# Patient Record
Sex: FEMALE | Race: WHITE | NOT HISPANIC OR LATINO | Employment: OTHER | URBAN - METROPOLITAN AREA
[De-identification: names, ages, dates, MRNs, and addresses within clinical notes are randomized per-mention and may not be internally consistent; named-entity substitution may affect disease eponyms.]

---

## 2017-02-08 RX ORDER — LISINOPRIL 20 MG/1
TABLET ORAL
Qty: 180 TABLET | Refills: 1 | Status: SHIPPED | OUTPATIENT
Start: 2017-02-08 | End: 2017-07-04 | Stop reason: SDUPTHER

## 2017-03-04 DIAGNOSIS — I10 ESSENTIAL HYPERTENSION: ICD-10-CM

## 2017-03-06 RX ORDER — HYDROCHLOROTHIAZIDE 12.5 MG/1
TABLET ORAL
Qty: 90 TABLET | Refills: 1 | Status: SHIPPED | OUTPATIENT
Start: 2017-03-06 | End: 2017-08-08 | Stop reason: SDUPTHER

## 2017-05-24 ENCOUNTER — OFFICE VISIT (OUTPATIENT)
Dept: INTERNAL MEDICINE | Facility: CLINIC | Age: 77
End: 2017-05-24

## 2017-05-24 VITALS
HEART RATE: 56 BPM | BODY MASS INDEX: 28.7 KG/M2 | HEIGHT: 61 IN | DIASTOLIC BLOOD PRESSURE: 70 MMHG | SYSTOLIC BLOOD PRESSURE: 112 MMHG | WEIGHT: 152 LBS | OXYGEN SATURATION: 97 %

## 2017-05-24 DIAGNOSIS — M79.641 BILATERAL HAND PAIN: Primary | ICD-10-CM

## 2017-05-24 DIAGNOSIS — M81.0 SENILE OSTEOPOROSIS: ICD-10-CM

## 2017-05-24 DIAGNOSIS — D50.0 IRON DEFICIENCY ANEMIA DUE TO CHRONIC BLOOD LOSS: ICD-10-CM

## 2017-05-24 DIAGNOSIS — M79.642 BILATERAL HAND PAIN: Primary | ICD-10-CM

## 2017-05-24 DIAGNOSIS — R05.9 COUGH: ICD-10-CM

## 2017-05-24 LAB
ANION GAP SERPL CALCULATED.3IONS-SCNC: 11 MMOL/L
BASOPHILS # BLD AUTO: 0.07 10*3/MM3 (ref 0–0.2)
BASOPHILS NFR BLD AUTO: 1.2 % (ref 0–2)
BUN BLD-MCNC: 34 MG/DL (ref 6–22)
BUN/CREAT SERPL: 21.4 (ref 7–25)
CALCIUM SPEC-SCNC: 9.2 MG/DL (ref 8.6–10.5)
CHLORIDE SERPL-SCNC: 99 MMOL/L (ref 95–107)
CO2 SERPL-SCNC: 26 MMOL/L (ref 23–32)
CREAT BLD-MCNC: 1.59 MG/DL (ref 0.55–1.02)
DEPRECATED RDW RBC AUTO: 55.7 FL (ref 37–54)
EOSINOPHIL # BLD AUTO: 0.58 10*3/MM3 (ref 0–0.7)
EOSINOPHIL NFR BLD AUTO: 9.6 % (ref 0–5)
ERYTHROCYTE [DISTWIDTH] IN BLOOD BY AUTOMATED COUNT: 17.4 % (ref 11.5–15)
FERRITIN SERPL-MCNC: 35.47 NG/ML (ref 13–150)
GFR SERPL CREATININE-BSD FRML MDRD: 32 ML/MIN/1.73
GLUCOSE BLD-MCNC: 95 MG/DL (ref 70–100)
HCT VFR BLD AUTO: 35.7 % (ref 34.1–44.9)
HGB BLD-MCNC: 11.2 G/DL (ref 11.2–15.7)
IRON SATN MFR SERPL: 19 % (ref 20–50)
IRON SERPL-MCNC: 74 MCG/DL (ref 37–145)
LOPINAVIR ISLT PHENOTYP: 310 MCG/DL
LYMPHOCYTES # BLD AUTO: 0.89 10*3/MM3 (ref 0.8–7)
LYMPHOCYTES NFR BLD AUTO: 14.8 % (ref 10–60)
MCH RBC QN AUTO: 27.8 PG (ref 26–34)
MCHC RBC AUTO-ENTMCNC: 31.4 G/DL (ref 31–37)
MCV RBC AUTO: 88.6 FL (ref 80–100)
MONOCYTES # BLD AUTO: 0.78 10*3/MM3 (ref 0–1)
MONOCYTES NFR BLD AUTO: 13 % (ref 0–13)
NEUTROPHILS # BLD AUTO: 3.7 10*3/MM3 (ref 1–11)
NEUTROPHILS NFR BLD AUTO: 61.4 % (ref 30–85)
PLATELET # BLD AUTO: 238 10*3/MM3 (ref 150–450)
PMV BLD AUTO: 9.3 FL (ref 6–12)
POTASSIUM BLD-SCNC: 4.6 MMOL/L (ref 3.3–5.3)
RBC # BLD AUTO: 4.03 10*6/MM3 (ref 3.93–5.22)
SODIUM BLD-SCNC: 136 MMOL/L (ref 136–145)
TIBC SERPL-MCNC: 384 MCG/DL
WBC NRBC COR # BLD: 6.02 10*3/MM3 (ref 5–10)

## 2017-05-24 PROCEDURE — 85025 COMPLETE CBC W/AUTO DIFF WBC: CPT | Performed by: NURSE PRACTITIONER

## 2017-05-24 PROCEDURE — 80048 BASIC METABOLIC PNL TOTAL CA: CPT | Performed by: NURSE PRACTITIONER

## 2017-05-24 PROCEDURE — 36415 COLL VENOUS BLD VENIPUNCTURE: CPT | Performed by: NURSE PRACTITIONER

## 2017-05-24 PROCEDURE — 99214 OFFICE O/P EST MOD 30 MIN: CPT | Performed by: NURSE PRACTITIONER

## 2017-05-24 RX ORDER — FERROUS SULFATE 325(65) MG
325 TABLET ORAL 2 TIMES DAILY
COMMUNITY

## 2017-05-24 RX ORDER — FAMOTIDINE 20 MG/1
20 TABLET, FILM COATED ORAL DAILY
COMMUNITY

## 2017-05-30 ENCOUNTER — HOSPITAL ENCOUNTER (OUTPATIENT)
Dept: INFUSION THERAPY | Facility: HOSPITAL | Age: 77
Discharge: HOME | End: 2017-05-30

## 2017-05-30 VITALS
TEMPERATURE: 97.4 F | OXYGEN SATURATION: 98 % | SYSTOLIC BLOOD PRESSURE: 150 MMHG | RESPIRATION RATE: 16 BRPM | HEART RATE: 47 BPM | DIASTOLIC BLOOD PRESSURE: 89 MMHG

## 2017-05-30 DIAGNOSIS — M81.0 SENILE OSTEOPOROSIS: ICD-10-CM

## 2017-05-30 PROCEDURE — 25010000002 DENOSUMAB 60 MG/ML SOLUTION

## 2017-05-30 PROCEDURE — 96401 CHEMO ANTI-NEOPL SQ/IM: CPT

## 2017-05-30 RX ADMIN — DENOSUMAB 60 MG: 60 INJECTION SUBCUTANEOUS at 13:50

## 2017-05-31 ENCOUNTER — TELEPHONE (OUTPATIENT)
Dept: INTERNAL MEDICINE | Facility: CLINIC | Age: 77
End: 2017-05-31

## 2017-07-05 RX ORDER — LISINOPRIL 20 MG/1
TABLET ORAL
Qty: 180 TABLET | Refills: 3 | Status: SHIPPED | OUTPATIENT
Start: 2017-07-05 | End: 2018-05-16 | Stop reason: SDUPTHER

## 2017-08-08 DIAGNOSIS — I10 ESSENTIAL HYPERTENSION: ICD-10-CM

## 2017-08-08 RX ORDER — HYDROCHLOROTHIAZIDE 12.5 MG/1
TABLET ORAL
Qty: 90 TABLET | Refills: 1 | Status: SHIPPED | OUTPATIENT
Start: 2017-08-08 | End: 2017-10-06

## 2017-08-15 RX ORDER — SIMVASTATIN 20 MG
TABLET ORAL
Qty: 90 TABLET | Refills: 3 | Status: SHIPPED | OUTPATIENT
Start: 2017-08-15 | End: 2018-04-13 | Stop reason: SDUPTHER

## 2017-09-02 DIAGNOSIS — E03.9 ACQUIRED HYPOTHYROIDISM: ICD-10-CM

## 2017-09-05 RX ORDER — LEVOTHYROXINE SODIUM 112 UG/1
TABLET ORAL
Qty: 90 TABLET | Refills: 1 | Status: SHIPPED | OUTPATIENT
Start: 2017-09-05 | End: 2017-11-27 | Stop reason: DRUGHIGH

## 2017-10-04 ENCOUNTER — CLINICAL SUPPORT (OUTPATIENT)
Dept: INTERNAL MEDICINE | Facility: CLINIC | Age: 77
End: 2017-10-04

## 2017-10-04 DIAGNOSIS — Z23 NEED FOR VACCINATION: Primary | ICD-10-CM

## 2017-10-04 PROCEDURE — G0008 ADMIN INFLUENZA VIRUS VAC: HCPCS

## 2017-11-02 ENCOUNTER — APPOINTMENT (OUTPATIENT)
Dept: WOMENS IMAGING | Facility: HOSPITAL | Age: 77
End: 2017-11-02

## 2017-11-02 PROCEDURE — G0202 SCR MAMMO BI INCL CAD: HCPCS | Performed by: RADIOLOGY

## 2017-11-02 PROCEDURE — 77063 BREAST TOMOSYNTHESIS BI: CPT | Performed by: RADIOLOGY

## 2017-11-02 PROCEDURE — MDREVIEWSP: Performed by: RADIOLOGY

## 2017-11-08 ENCOUNTER — TELEPHONE (OUTPATIENT)
Dept: INTERNAL MEDICINE | Facility: CLINIC | Age: 77
End: 2017-11-08

## 2017-11-08 NOTE — TELEPHONE ENCOUNTER
----- Message from JUNE López sent at 11/8/2017  9:19 AM EST -----  Regarding: FW: Visit Follow-Up Question  Contact: [phone]  Please call patient-we can schedule a lab appt early in the morning that day or another day if that would be best for her, whatever she prefers. Thanks.  ----- Message -----     From: Estrella Rodriguez MA     Sent: 11/8/2017   8:47 AM       To: JUNE López  Subject: FW: Visit Follow-Up Question                     Please see message below thanks      ----- Message -----     From: Sylvia Stover     Sent: 11/7/2017   1:23 PM       To: Teofilo Bourne [facility]  Subject: Visit Follow-Up Question                         I have a routine check-up appointment at 1 PM on 11/27.  Should I schedule a blood draw on the same day?  If this is a fasting test I would like to do it fairly early in  the morning.  Also, I'd like to do a calcium measurement from the same blood sample since I have an appointment for a Prolia injection at 1:30 on 11/30/17 at [facility] [place].      Thank you.

## 2017-11-17 ENCOUNTER — TELEPHONE (OUTPATIENT)
Dept: INTERNAL MEDICINE | Facility: CLINIC | Age: 77
End: 2017-11-17

## 2017-11-21 ENCOUNTER — TELEPHONE (OUTPATIENT)
Dept: INTERNAL MEDICINE | Facility: CLINIC | Age: 77
End: 2017-11-21

## 2017-11-21 ENCOUNTER — LAB (OUTPATIENT)
Dept: INTERNAL MEDICINE | Facility: CLINIC | Age: 77
End: 2017-11-21

## 2017-11-21 DIAGNOSIS — I10 ESSENTIAL HYPERTENSION: ICD-10-CM

## 2017-11-21 DIAGNOSIS — E78.5 HYPERLIPIDEMIA, UNSPECIFIED HYPERLIPIDEMIA TYPE: ICD-10-CM

## 2017-11-21 DIAGNOSIS — E03.9 ACQUIRED HYPOTHYROIDISM: Primary | ICD-10-CM

## 2017-11-21 DIAGNOSIS — E03.9 ACQUIRED HYPOTHYROIDISM: ICD-10-CM

## 2017-11-21 LAB
ALBUMIN SERPL-MCNC: 4.5 G/DL (ref 3.5–5.2)
ALBUMIN/GLOB SERPL: 1.7 G/DL
ALP SERPL-CCNC: 41 U/L (ref 39–117)
ALT SERPL-CCNC: 15 U/L (ref 1–33)
AST SERPL-CCNC: 17 U/L (ref 1–32)
BILIRUB SERPL-MCNC: 0.5 MG/DL (ref 0.1–1.2)
BUN SERPL-MCNC: 28 MG/DL (ref 8–23)
BUN/CREAT SERPL: 21.7 (ref 7–25)
CALCIUM SERPL-MCNC: 10.1 MG/DL (ref 8.6–10.5)
CHLORIDE SERPL-SCNC: 100 MMOL/L (ref 98–107)
CHOLEST SERPL-MCNC: 140 MG/DL (ref 0–200)
CO2 SERPL-SCNC: 25.6 MMOL/L (ref 22–29)
CREAT SERPL-MCNC: 1.29 MG/DL (ref 0.57–1)
GLOBULIN SER CALC-MCNC: 2.7 GM/DL
GLUCOSE SERPL-MCNC: 97 MG/DL (ref 65–99)
HDLC SERPL-MCNC: 43 MG/DL (ref 40–60)
LDLC SERPL CALC-MCNC: 68 MG/DL (ref 0–100)
POTASSIUM SERPL-SCNC: 4.7 MMOL/L (ref 3.5–5.2)
PROT SERPL-MCNC: 7.2 G/DL (ref 6–8.5)
SODIUM SERPL-SCNC: 137 MMOL/L (ref 136–145)
TRIGL SERPL-MCNC: 144 MG/DL (ref 0–150)
TSH SERPL-ACNC: 0.17 MIU/ML (ref 0.27–4.2)
VLDLC SERPL-MCNC: 28.8 MG/DL (ref 5–40)

## 2017-11-21 NOTE — TELEPHONE ENCOUNTER
----- Message from Leti Coleman sent at 11/21/2017  8:03 AM EST -----  Contact: pt  Please have Rhoda put lab orders in for this patient. She is coming in today at 10am. thanks

## 2017-11-22 LAB — T4 FREE SERPL-MCNC: 1.84 NG/DL (ref 0.93–1.7)

## 2017-11-27 ENCOUNTER — OFFICE VISIT (OUTPATIENT)
Dept: INTERNAL MEDICINE | Facility: CLINIC | Age: 77
End: 2017-11-27

## 2017-11-27 VITALS
HEIGHT: 61 IN | WEIGHT: 149 LBS | BODY MASS INDEX: 28.13 KG/M2 | DIASTOLIC BLOOD PRESSURE: 80 MMHG | HEART RATE: 60 BPM | SYSTOLIC BLOOD PRESSURE: 124 MMHG | OXYGEN SATURATION: 99 %

## 2017-11-27 DIAGNOSIS — E03.9 ACQUIRED HYPOTHYROIDISM: ICD-10-CM

## 2017-11-27 DIAGNOSIS — M81.0 SENILE OSTEOPOROSIS: ICD-10-CM

## 2017-11-27 DIAGNOSIS — I10 ESSENTIAL HYPERTENSION: Primary | ICD-10-CM

## 2017-11-27 PROCEDURE — 99214 OFFICE O/P EST MOD 30 MIN: CPT | Performed by: NURSE PRACTITIONER

## 2017-11-27 RX ORDER — LEVOTHYROXINE SODIUM 0.1 MG/1
100 TABLET ORAL DAILY
Qty: 90 TABLET | Refills: 1 | Status: SHIPPED | OUTPATIENT
Start: 2017-11-27 | End: 2018-02-12 | Stop reason: SDUPTHER

## 2017-11-27 RX ORDER — AMLODIPINE BESYLATE 2.5 MG/1
2.5 TABLET ORAL DAILY
Qty: 90 TABLET | Refills: 1 | Status: SHIPPED | OUTPATIENT
Start: 2017-11-27 | End: 2018-02-06 | Stop reason: SDUPTHER

## 2017-11-28 NOTE — PROGRESS NOTES
Subjective   Sylvia Stover is a 77 y.o. female who presents for f/u regarding HTN, hyperlipidemia and hypothryoidism.    HPI Comments: Nephrology discontinued HCTZ on 9/2017 due to elevated Cr (1.7), BP has increased slightly. However, she reports fewer leg cramps at night. Her Cr has improved to 1.29 with recent labs.  Her recent TSH was decreased on Synthroid 112mcg daily.    Hypertension   This is a chronic problem. Episode onset: years ago. The problem has been gradually worsening since onset. The problem is uncontrolled. Pertinent negatives include no anxiety, chest pain, headaches, orthopnea, palpitations, peripheral edema or shortness of breath. There are no associated agents to hypertension. Past treatments include calcium channel blockers and beta blockers. The current treatment provides moderate improvement. There are no compliance problems.  Hypertensive end-organ damage includes kidney disease. Thyroid problem: hypothyroidism.   Hypothyroidism   This is a chronic problem. The current episode started more than 1 year ago. Associated symptoms include congestion, fatigue and myalgias (she has a chronic problem with episodic leg muscle cramps.). Pertinent negatives include no abdominal pain, arthralgias, chest pain, chills, coughing, fever, headaches, joint swelling, nausea, sore throat, vomiting or weakness. Urinary symptoms: nocturia: 2 to 4 times.   Hyperlipidemia   This is a chronic problem. The current episode started more than 1 year ago. The problem is controlled. Recent lipid tests were reviewed and are low. Exacerbating diseases include hypothyroidism. She has no history of diabetes. Associated symptoms include myalgias (she has a chronic problem with episodic leg muscle cramps.). Pertinent negatives include no chest pain or shortness of breath. Current antihyperlipidemic treatment includes statins (She is tolerating Simvastatin). The current treatment provides significant improvement of lipids.  There are no compliance problems (denies myalgias with Simvastatin).         The following portions of the patient's history were reviewed and updated as appropriate: allergies, current medications, past social history and problem list.    Past Medical History:   Diagnosis Date   • Age related osteoporosis    • Anemia    • Chronic gastritis    • Chronic renal insufficiency    • Gastric ulcer    • GERD (gastroesophageal reflux disease)    • Greater trochanteric bursitis    • H/O bone density study 11/2012    Osteopenia   • H/O mammogram 10/28/2015    stable   • Hyperlipidemia    • Hypertension    • Hypothyroidism    • Lumbago    • Osteoarthrosis    • PONV (postoperative nausea and vomiting)          Current Outpatient Prescriptions:   •  aspirin 81 MG tablet, Take 81 mg by mouth daily., Disp: , Rfl:   •  budesonide (PULMICORT) 90 MCG/ACT inhaler, Inhale 1 puff 2 (Two) Times a Day. Rinse and spit after use, Disp: 3 inhaler, Rfl: 1  •  Cholecalciferol (VITAMIN D) 2000 UNITS capsule, Take  by mouth., Disp: , Rfl:   •  denosumab (PROLIA) 60 MG/ML solution syringe, Inject  under the skin. Every 6 months, Disp: , Rfl:   •  diclofenac (VOLTAREN) 1 % gel gel, Apply 4 g topically 4 (Four) Times a Day As Needed (pain)., Disp: 300 g, Rfl: 1  •  famotidine (PEPCID) 20 MG tablet, Take 20 mg by mouth 2 (Two) Times a Day., Disp: , Rfl:   •  ferrous sulfate 325 (65 FE) MG tablet, Take 325 mg by mouth Daily With Breakfast., Disp: , Rfl:   •  lisinopril (PRINIVIL,ZESTRIL) 20 MG tablet, Take 1 tablet by mouth two  times daily, Disp: 180 tablet, Rfl: 3  •  metoprolol tartrate (LOPRESSOR) 50 MG tablet, Take 1 tablet by mouth 2 (Two) Times a Day., Disp: 180 tablet, Rfl: 3  •  Multiple Vitamins-Minerals (MULTIVITAMIN PO), Take  by mouth., Disp: , Rfl:   •  simvastatin (ZOCOR) 20 MG tablet, Take 1 tablet by mouth  every night at bedtime, Disp: 90 tablet, Rfl: 3  •  amLODIPine (NORVASC) 2.5 MG tablet, Take 1 tablet by mouth Daily., Disp: 90  "tablet, Rfl: 1  •  levothyroxine (SYNTHROID, LEVOTHROID) 100 MCG tablet, Take 1 tablet by mouth Daily., Disp: 90 tablet, Rfl: 1    Allergies   Allergen Reactions   • Codeine        Review of Systems   Constitutional: Positive for fatigue. Negative for chills, fever and unexpected weight change.   HENT: Positive for congestion. Negative for ear pain, postnasal drip, sinus pressure, sore throat and trouble swallowing.    Eyes: Negative for visual disturbance.   Respiratory: Negative for cough, chest tightness, shortness of breath and wheezing.    Cardiovascular: Negative for chest pain, palpitations, orthopnea and leg swelling.   Gastrointestinal: Negative for abdominal pain, blood in stool, nausea and vomiting.   Genitourinary: Negative for dysuria, frequency and urgency.   Musculoskeletal: Positive for myalgias (she has a chronic problem with episodic leg muscle cramps.). Negative for arthralgias and joint swelling.   Skin: Negative for color change.   Neurological: Negative for syncope, weakness and headaches.   Hematological: Does not bruise/bleed easily.   Psychiatric/Behavioral: Positive for sleep disturbance. The patient has insomnia.        Objective   Vitals:    11/27/17 1256   BP: 124/80   BP Location: Left arm   Patient Position: Sitting   Cuff Size: Adult   Pulse: 60   SpO2: 99%   Weight: 149 lb (67.6 kg)   Height: 61\" (154.9 cm)     Physical Exam   Constitutional: She is oriented to person, place, and time. She appears well-developed and well-nourished. No distress.   HENT:   Head: Normocephalic and atraumatic.   Right Ear: External ear normal.   Left Ear: External ear normal.   Eyes: Conjunctivae are normal.   Neck: Normal range of motion. Neck supple.   Cardiovascular: Normal rate, regular rhythm, normal heart sounds and intact distal pulses.  Exam reveals no gallop and no friction rub.    No murmur heard.  Pulmonary/Chest: Effort normal and breath sounds normal. No respiratory distress. "   Lymphadenopathy:     She has no cervical adenopathy.   Neurological: She is alert and oriented to person, place, and time.   Skin: Skin is warm and dry. No rash noted. No erythema.   Psychiatric: She has a normal mood and affect. Her behavior is normal.   Nursing note and vitals reviewed.      Assessment/Plan   Sylvia was seen today for hypertension and hypothyroidism.    Diagnoses and all orders for this visit:    Essential hypertension  Comments:  add Amlodipine for better BP control  Orders:  -     amLODIPine (NORVASC) 2.5 MG tablet; Take 1 tablet by mouth Daily.    Acquired hypothyroidism  Comments:  reduce Synthroid from 112mcg to 100mcg and recheck at f/u appt  Orders:  -     levothyroxine (SYNTHROID, LEVOTHROID) 100 MCG tablet; Take 1 tablet by mouth Daily.    Senile osteoporosis  Comments:  last bone density 2015, recheck and consider if Prolia is still needed    Other orders  -     DEXA Bone Density Axial; Future

## 2017-11-29 ENCOUNTER — TELEPHONE (OUTPATIENT)
Dept: INTERNAL MEDICINE | Facility: CLINIC | Age: 77
End: 2017-11-29

## 2017-11-29 DIAGNOSIS — M81.0 OSTEOPOROSIS, SENILE: Primary | ICD-10-CM

## 2017-11-29 NOTE — TELEPHONE ENCOUNTER
----- Message from Davina Banueols MA sent at 11/29/2017 10:26 AM EST -----  ELISEO LEZAMA needs order for Prolia Inj Therapy  Pt is schedule for tomorrow 11/30

## 2017-11-30 ENCOUNTER — HOSPITAL ENCOUNTER (OUTPATIENT)
Dept: INFUSION THERAPY | Facility: HOSPITAL | Age: 77
Discharge: HOME | End: 2017-11-30

## 2017-11-30 VITALS
OXYGEN SATURATION: 99 % | RESPIRATION RATE: 20 BRPM | SYSTOLIC BLOOD PRESSURE: 158 MMHG | HEART RATE: 50 BPM | TEMPERATURE: 95.5 F | DIASTOLIC BLOOD PRESSURE: 91 MMHG

## 2017-11-30 DIAGNOSIS — M81.0 SENILE OSTEOPOROSIS: ICD-10-CM

## 2017-11-30 PROCEDURE — 96372 THER/PROPH/DIAG INJ SC/IM: CPT

## 2017-11-30 PROCEDURE — 25010000002 DENOSUMAB 60 MG/ML SOLUTION

## 2017-11-30 RX ADMIN — DENOSUMAB 60 MG: 60 INJECTION SUBCUTANEOUS at 13:59

## 2017-12-19 DIAGNOSIS — I10 ESSENTIAL HYPERTENSION: ICD-10-CM

## 2017-12-20 RX ORDER — METOPROLOL TARTRATE 50 MG/1
TABLET, FILM COATED ORAL
Qty: 270 TABLET | Refills: 3 | Status: SHIPPED | OUTPATIENT
Start: 2017-12-20 | End: 2018-04-02 | Stop reason: SDUPTHER

## 2018-02-06 DIAGNOSIS — I10 ESSENTIAL HYPERTENSION: ICD-10-CM

## 2018-02-06 RX ORDER — AMLODIPINE BESYLATE 2.5 MG/1
TABLET ORAL
Qty: 90 TABLET | Refills: 1 | Status: SHIPPED | OUTPATIENT
Start: 2018-02-06 | End: 2018-03-15 | Stop reason: SDUPTHER

## 2018-02-12 DIAGNOSIS — E03.9 ACQUIRED HYPOTHYROIDISM: ICD-10-CM

## 2018-02-12 RX ORDER — LEVOTHYROXINE SODIUM 0.1 MG/1
TABLET ORAL
Qty: 90 TABLET | Refills: 3 | Status: SHIPPED | OUTPATIENT
Start: 2018-02-12 | End: 2018-05-29 | Stop reason: SDUPTHER

## 2018-03-15 DIAGNOSIS — I10 ESSENTIAL HYPERTENSION: Primary | ICD-10-CM

## 2018-03-15 DIAGNOSIS — I10 ESSENTIAL HYPERTENSION: ICD-10-CM

## 2018-03-15 RX ORDER — AMLODIPINE BESYLATE 5 MG/1
5 TABLET ORAL DAILY
Qty: 90 TABLET | Refills: 1 | Status: SHIPPED | OUTPATIENT
Start: 2018-03-15 | End: 2018-04-13 | Stop reason: SDUPTHER

## 2018-04-02 DIAGNOSIS — I10 ESSENTIAL HYPERTENSION: ICD-10-CM

## 2018-04-02 RX ORDER — METOPROLOL TARTRATE 50 MG/1
TABLET, FILM COATED ORAL
Qty: 270 TABLET | Refills: 3 | Status: SHIPPED | OUTPATIENT
Start: 2018-04-02 | End: 2018-06-22 | Stop reason: SDUPTHER

## 2018-04-13 DIAGNOSIS — I10 ESSENTIAL HYPERTENSION: ICD-10-CM

## 2018-04-13 RX ORDER — SIMVASTATIN 20 MG
20 TABLET ORAL
Qty: 90 TABLET | Refills: 3 | Status: SHIPPED | OUTPATIENT
Start: 2018-04-13 | End: 2018-07-16 | Stop reason: SDUPTHER

## 2018-04-13 RX ORDER — AMLODIPINE BESYLATE 5 MG/1
5 TABLET ORAL DAILY
Qty: 90 TABLET | Refills: 1 | Status: SHIPPED | OUTPATIENT
Start: 2018-04-13 | End: 2018-04-25 | Stop reason: SDUPTHER

## 2018-04-25 DIAGNOSIS — I10 ESSENTIAL HYPERTENSION: ICD-10-CM

## 2018-04-25 RX ORDER — AMLODIPINE BESYLATE 5 MG/1
5 TABLET ORAL DAILY
Qty: 90 TABLET | Refills: 1 | Status: SHIPPED | OUTPATIENT
Start: 2018-04-25 | End: 2018-05-17 | Stop reason: SDUPTHER

## 2018-05-16 RX ORDER — LISINOPRIL 20 MG/1
20 TABLET ORAL 2 TIMES DAILY
Qty: 180 TABLET | Refills: 3 | Status: SHIPPED | OUTPATIENT
Start: 2018-05-16 | End: 2018-08-06 | Stop reason: SDUPTHER

## 2018-05-17 DIAGNOSIS — I10 ESSENTIAL HYPERTENSION: ICD-10-CM

## 2018-05-17 RX ORDER — AMLODIPINE BESYLATE 5 MG/1
5 TABLET ORAL DAILY
Qty: 90 TABLET | Refills: 1 | Status: SHIPPED | OUTPATIENT
Start: 2018-05-17 | End: 2018-05-30 | Stop reason: SDUPTHER

## 2018-05-29 DIAGNOSIS — E03.9 ACQUIRED HYPOTHYROIDISM: ICD-10-CM

## 2018-05-29 RX ORDER — LEVOTHYROXINE SODIUM 0.1 MG/1
100 TABLET ORAL DAILY
Qty: 90 TABLET | Refills: 3 | Status: SHIPPED | OUTPATIENT
Start: 2018-05-29 | End: 2018-08-28 | Stop reason: SDUPTHER

## 2018-05-30 ENCOUNTER — TELEPHONE (OUTPATIENT)
Dept: INTERNAL MEDICINE | Facility: CLINIC | Age: 78
End: 2018-05-30

## 2018-05-30 ENCOUNTER — CLINICAL SUPPORT (OUTPATIENT)
Dept: INTERNAL MEDICINE | Facility: CLINIC | Age: 78
End: 2018-05-30

## 2018-05-30 ENCOUNTER — OFFICE VISIT (OUTPATIENT)
Dept: INTERNAL MEDICINE | Facility: CLINIC | Age: 78
End: 2018-05-30

## 2018-05-30 VITALS
DIASTOLIC BLOOD PRESSURE: 82 MMHG | WEIGHT: 148 LBS | OXYGEN SATURATION: 98 % | BODY MASS INDEX: 27.94 KG/M2 | HEIGHT: 61 IN | SYSTOLIC BLOOD PRESSURE: 132 MMHG | HEART RATE: 48 BPM

## 2018-05-30 DIAGNOSIS — N18.30 STAGE 3 CHRONIC KIDNEY DISEASE (HCC): ICD-10-CM

## 2018-05-30 DIAGNOSIS — H81.10 BENIGN PAROXYSMAL POSITIONAL VERTIGO, UNSPECIFIED LATERALITY: ICD-10-CM

## 2018-05-30 DIAGNOSIS — M81.0 SENILE OSTEOPOROSIS: ICD-10-CM

## 2018-05-30 DIAGNOSIS — M81.0 OSTEOPOROSIS, SENILE: ICD-10-CM

## 2018-05-30 DIAGNOSIS — I10 ESSENTIAL HYPERTENSION: Primary | ICD-10-CM

## 2018-05-30 LAB
BUN SERPL-MCNC: 25 MG/DL (ref 8–23)
BUN/CREAT SERPL: 16.3 (ref 7–25)
CALCIUM SERPL-MCNC: 10.2 MG/DL (ref 8.6–10.5)
CHLORIDE SERPL-SCNC: 99 MMOL/L (ref 98–107)
CO2 SERPL-SCNC: 24.8 MMOL/L (ref 22–29)
CREAT SERPL-MCNC: 1.53 MG/DL (ref 0.57–1)
GLUCOSE SERPL-MCNC: 92 MG/DL (ref 65–99)
POTASSIUM SERPL-SCNC: 5 MMOL/L (ref 3.5–5.2)
SODIUM SERPL-SCNC: 140 MMOL/L (ref 136–145)

## 2018-05-30 PROCEDURE — 77080 DXA BONE DENSITY AXIAL: CPT | Performed by: NURSE PRACTITIONER

## 2018-05-30 PROCEDURE — 99214 OFFICE O/P EST MOD 30 MIN: CPT | Performed by: NURSE PRACTITIONER

## 2018-05-30 RX ORDER — MECLIZINE HCL 12.5 MG/1
12.5 TABLET ORAL 3 TIMES DAILY PRN
Qty: 30 TABLET | Refills: 0 | Status: SHIPPED | OUTPATIENT
Start: 2018-05-30 | End: 2022-01-24 | Stop reason: SDUPTHER

## 2018-05-30 RX ORDER — AMLODIPINE BESYLATE 2.5 MG/1
2.5 TABLET ORAL DAILY
Qty: 90 TABLET | Refills: 1 | Status: SHIPPED | OUTPATIENT
Start: 2018-05-30 | End: 2018-06-04 | Stop reason: SDUPTHER

## 2018-05-30 NOTE — TELEPHONE ENCOUNTER
----- Message from Jewels Ellis sent at 5/30/2018  2:03 PM EDT -----  Contact: Anastasia with [facility]  Pharmacy is calling to get clarification on or to change    RX:meclizine (ANTIVERT) 12.5 MG tablet     Directions.    Caller#[phone]

## 2018-05-31 NOTE — PROGRESS NOTES
Subjective   Sylvia Stover is a 77 y.o. female who presents for f/u regarding HTN, hypothyroidism and hyperlipidemia.    She has brought home BP readings which are excellent, BP consistently <140/90 on current regimen.   She has seen Dr. Spence (hx stage 3 CKD) with stable labs (Cr 1.46, GFR 40).  Her bone density today shows improving T scores, tolerating Prolia well.      Hypertension   This is a chronic problem. Episode onset: years ago. The problem has been gradually worsening since onset. The problem is uncontrolled. Pertinent negatives include no anxiety, chest pain, headaches, orthopnea, palpitations, peripheral edema or shortness of breath. There are no associated agents to hypertension. Current antihypertension treatment includes calcium channel blockers and beta blockers. The current treatment provides moderate improvement. There are no compliance problems.  Hypertensive end-organ damage includes kidney disease. Thyroid problem: hypothyroidism.   Hypothyroidism   This is a chronic problem. The current episode started more than 1 year ago. Associated symptoms include congestion, fatigue and myalgias (she has a chronic problem with episodic leg muscle cramps.). Pertinent negatives include no abdominal pain, arthralgias, chest pain, chills, coughing, fever, headaches, joint swelling, nausea, sore throat, vomiting or weakness. Urinary symptoms: nocturia: 2 to 4 times.   Hyperlipidemia   This is a chronic problem. The current episode started more than 1 year ago. The problem is controlled. Recent lipid tests were reviewed and are low. Exacerbating diseases include hypothyroidism. She has no history of diabetes. Associated symptoms include myalgias (she has a chronic problem with episodic leg muscle cramps.). Pertinent negatives include no chest pain or shortness of breath. Current antihyperlipidemic treatment includes statins (She is tolerating Simvastatin). The current treatment provides significant  improvement of lipids. There are no compliance problems (denies myalgias with Simvastatin).         The following portions of the patient's history were reviewed and updated as appropriate: allergies, current medications, past social history and problem list.    Past Medical History:   Diagnosis Date   • Age related osteoporosis    • Anemia    • Chronic gastritis    • Chronic renal insufficiency    • Gastric ulcer    • GERD (gastroesophageal reflux disease)    • Greater trochanteric bursitis    • H/O bone density study 11/2012    Osteopenia   • H/O mammogram 10/28/2015    stable   • Hyperlipidemia    • Hypertension    • Hypothyroidism    • Lumbago    • Osteoarthrosis    • PONV (postoperative nausea and vomiting)          Current Outpatient Prescriptions:   •  amLODIPine (NORVASC) 2.5 MG tablet, Take 1 tablet by mouth Daily., Disp: 90 tablet, Rfl: 1  •  aspirin 81 MG tablet, Take 81 mg by mouth daily., Disp: , Rfl:   •  budesonide (PULMICORT) 90 MCG/ACT inhaler, Inhale 1 puff 2 (Two) Times a Day. Rinse and spit after use, Disp: 3 inhaler, Rfl: 1  •  Cholecalciferol (VITAMIN D) 2000 UNITS capsule, Take  by mouth., Disp: , Rfl:   •  denosumab (PROLIA) 60 MG/ML solution syringe, Inject  under the skin. Every 6 months, Disp: , Rfl:   •  diclofenac (VOLTAREN) 1 % gel gel, Apply 4 g topically 4 (Four) Times a Day As Needed (pain)., Disp: 300 g, Rfl: 1  •  famotidine (PEPCID) 20 MG tablet, Take 20 mg by mouth 2 (Two) Times a Day., Disp: , Rfl:   •  ferrous sulfate 325 (65 FE) MG tablet, Take 325 mg by mouth Daily With Breakfast., Disp: , Rfl:   •  levothyroxine (SYNTHROID, LEVOTHROID) 100 MCG tablet, Take 1 tablet by mouth Daily., Disp: 90 tablet, Rfl: 3  •  lisinopril (PRINIVIL,ZESTRIL) 20 MG tablet, Take 1 tablet by mouth 2 (Two) Times a Day., Disp: 180 tablet, Rfl: 3  •  metoprolol tartrate (LOPRESSOR) 50 MG tablet, Take 1.5 tablets by mouth two times daily, Disp: 270 tablet, Rfl: 3  •  Multiple Vitamins-Minerals  "(MULTIVITAMIN PO), Take  by mouth., Disp: , Rfl:   •  simvastatin (ZOCOR) 20 MG tablet, Take 1 tablet by mouth every night at bedtime., Disp: 90 tablet, Rfl: 3  •  meclizine (ANTIVERT) 12.5 MG tablet, Take 1 tablet by mouth 3 (Three) Times a Day As Needed for dizziness. 1-2 tablets every 8 hours as needed, Disp: 30 tablet, Rfl: 0    Allergies   Allergen Reactions   • Codeine        Review of Systems   Constitutional: Positive for fatigue. Negative for chills and fever.   HENT: Positive for congestion. Negative for sore throat.    Respiratory: Negative for cough and shortness of breath.    Cardiovascular: Negative for chest pain, palpitations and orthopnea.   Gastrointestinal: Negative for abdominal pain, nausea and vomiting.   Musculoskeletal: Positive for myalgias (she has a chronic problem with episodic leg muscle cramps.). Negative for arthralgias and joint swelling.   Neurological: Negative for weakness and headaches.       Objective   Vitals:    05/30/18 1335   BP: 132/82   BP Location: Left arm   Patient Position: Sitting   Cuff Size: Adult   Pulse: (!) 48   SpO2: 98%   Weight: 67.1 kg (148 lb)   Height: 154.9 cm (61\")     Physical Exam   Constitutional: She appears well-developed and well-nourished. She is cooperative. She does not have a sickly appearance. She does not appear ill.   HENT:   Head: Normocephalic.   Right Ear: Hearing, tympanic membrane and external ear normal.   Left Ear: Hearing, tympanic membrane and external ear normal.   Nose: Nose normal. No mucosal edema, rhinorrhea, sinus tenderness or nasal deformity. Right sinus exhibits no maxillary sinus tenderness and no frontal sinus tenderness. Left sinus exhibits no maxillary sinus tenderness and no frontal sinus tenderness.   Mouth/Throat: Oropharynx is clear and moist and mucous membranes are normal. Normal dentition.   Eyes: Conjunctivae and lids are normal. Pupils are equal, round, and reactive to light. Right eye exhibits no discharge and " no exudate. Left eye exhibits no discharge and no exudate.   Neck: Trachea normal and normal range of motion. Carotid bruit is not present. No edema present. No thyroid mass and no thyromegaly present.   Cardiovascular: Regular rhythm, normal heart sounds and normal pulses.    No murmur heard.  Pulmonary/Chest: Breath sounds normal. No respiratory distress. She has no decreased breath sounds. She has no wheezes. She has no rhonchi. She has no rales.   Lymphadenopathy:        Head (right side): No submental, no submandibular, no tonsillar, no preauricular, no posterior auricular and no occipital adenopathy present.        Head (left side): No submental, no submandibular, no tonsillar, no preauricular, no posterior auricular and no occipital adenopathy present.   Neurological: She is alert.   Skin: Skin is warm, dry and intact. No cyanosis. Nails show no clubbing.       Assessment/Plan   Sylvia was seen today for hypertension.    Diagnoses and all orders for this visit:    Essential hypertension  Comments:  stable on current meds  Orders:  -     amLODIPine (NORVASC) 2.5 MG tablet; Take 1 tablet by mouth Daily.  -     Basic Metabolic Panel; Future  -     Basic Metabolic Panel    Benign paroxysmal positional vertigo, unspecified laterality  Comments:  intermittent episodes of lightheadedness, takes Meclizine only as needed  Orders:  -     meclizine (ANTIVERT) 12.5 MG tablet; Take 1 tablet by mouth 3 (Three) Times a Day As Needed for dizziness. 1-2 tablets every 8 hours as needed    Osteoporosis, senile  Comments:  bone density today, tolerating Prolia (injection next week)    Stage 3 chronic kidney disease  Comments:  followed by Dr. Spence, recent labs are stable

## 2018-06-04 ENCOUNTER — HOSPITAL ENCOUNTER (OUTPATIENT)
Dept: INFUSION THERAPY | Facility: HOSPITAL | Age: 78
Discharge: HOME | End: 2018-06-04

## 2018-06-04 VITALS
HEART RATE: 48 BPM | SYSTOLIC BLOOD PRESSURE: 160 MMHG | DIASTOLIC BLOOD PRESSURE: 74 MMHG | OXYGEN SATURATION: 97 % | RESPIRATION RATE: 20 BRPM | TEMPERATURE: 95.6 F

## 2018-06-04 DIAGNOSIS — M81.0 SENILE OSTEOPOROSIS: ICD-10-CM

## 2018-06-04 DIAGNOSIS — I10 ESSENTIAL HYPERTENSION: ICD-10-CM

## 2018-06-04 PROCEDURE — 25010000002 DENOSUMAB 60 MG/ML SOLUTION: Performed by: NURSE PRACTITIONER

## 2018-06-04 PROCEDURE — 96372 THER/PROPH/DIAG INJ SC/IM: CPT

## 2018-06-04 RX ORDER — AMLODIPINE BESYLATE 2.5 MG/1
2.5 TABLET ORAL DAILY
Qty: 90 TABLET | Refills: 1 | Status: SHIPPED | OUTPATIENT
Start: 2018-06-04 | End: 2018-08-20 | Stop reason: SDUPTHER

## 2018-06-04 RX ADMIN — DENOSUMAB 60 MG: 60 INJECTION SUBCUTANEOUS at 13:47

## 2018-06-06 ENCOUNTER — TELEPHONE (OUTPATIENT)
Dept: INTERNAL MEDICINE | Facility: CLINIC | Age: 78
End: 2018-06-06

## 2018-06-06 NOTE — TELEPHONE ENCOUNTER
----- Message from Lyric West sent at 6/6/2018 11:01 AM EDT -----  Contact: Pharmacy  [facility] calling to clarify prescription     amLODIPine (NORVASC) 2.5 MG tablet    [facility]:[phone]  REF:[id number]

## 2018-06-12 DIAGNOSIS — M81.0 OSTEOPOROSIS, SENILE: Primary | ICD-10-CM

## 2018-06-20 DIAGNOSIS — M25.511 ACUTE PAIN OF RIGHT SHOULDER: Primary | ICD-10-CM

## 2018-06-22 DIAGNOSIS — I10 ESSENTIAL HYPERTENSION: ICD-10-CM

## 2018-06-22 RX ORDER — METOPROLOL TARTRATE 50 MG/1
TABLET, FILM COATED ORAL
Qty: 270 TABLET | Refills: 3 | Status: SHIPPED | OUTPATIENT
Start: 2018-06-22 | End: 2018-06-27 | Stop reason: SDUPTHER

## 2018-06-27 DIAGNOSIS — I10 ESSENTIAL HYPERTENSION: ICD-10-CM

## 2018-06-27 RX ORDER — METOPROLOL TARTRATE 50 MG/1
TABLET, FILM COATED ORAL
Qty: 270 TABLET | Refills: 3 | Status: SHIPPED | OUTPATIENT
Start: 2018-06-27 | End: 2019-03-30 | Stop reason: SDUPTHER

## 2018-07-16 ENCOUNTER — TELEPHONE (OUTPATIENT)
Dept: INTERNAL MEDICINE | Facility: CLINIC | Age: 78
End: 2018-07-16

## 2018-07-16 RX ORDER — SIMVASTATIN 20 MG
20 TABLET ORAL
Qty: 90 TABLET | Refills: 3 | Status: SHIPPED | OUTPATIENT
Start: 2018-07-16 | End: 2018-10-15 | Stop reason: SDUPTHER

## 2018-07-16 NOTE — TELEPHONE ENCOUNTER
"----- Message from Sylvia Stover sent at 7/15/2018  4:47 PM EDT -----  Regarding: Prescription Question  Contact: [phone]  About two weeks ago, I sent a request by computer to [facility] to refill my Simvastatin prescription.  I've since received two e-mail notices from them saying    \"We contacted your doctor again to approve your prescription for order number [id number], but we have not yet received a response.\"    I assume they sent the refill request to Dr. Cantrell since he's the one who wrote the prescription in April.  Could you help me by either asking him about it or could you just write me a new prescription with [facility].  Thank you.      "

## 2018-08-06 RX ORDER — LISINOPRIL 20 MG/1
20 TABLET ORAL 2 TIMES DAILY
Qty: 180 TABLET | Refills: 3 | Status: SHIPPED | OUTPATIENT
Start: 2018-08-06 | End: 2019-05-28 | Stop reason: SDUPTHER

## 2018-08-20 DIAGNOSIS — I10 ESSENTIAL HYPERTENSION: ICD-10-CM

## 2018-08-20 RX ORDER — AMLODIPINE BESYLATE 2.5 MG/1
2.5 TABLET ORAL DAILY
Qty: 90 TABLET | Refills: 1 | Status: SHIPPED | OUTPATIENT
Start: 2018-08-20 | End: 2018-08-24 | Stop reason: SDUPTHER

## 2018-08-24 DIAGNOSIS — I10 ESSENTIAL HYPERTENSION: ICD-10-CM

## 2018-08-24 RX ORDER — AMLODIPINE BESYLATE 2.5 MG/1
2.5 TABLET ORAL DAILY
Qty: 90 TABLET | Refills: 1 | Status: SHIPPED | OUTPATIENT
Start: 2018-08-24 | End: 2019-05-23 | Stop reason: SDUPTHER

## 2018-08-28 ENCOUNTER — TELEPHONE (OUTPATIENT)
Dept: INTERNAL MEDICINE | Facility: CLINIC | Age: 78
End: 2018-08-28

## 2018-08-28 DIAGNOSIS — E03.9 ACQUIRED HYPOTHYROIDISM: ICD-10-CM

## 2018-08-28 RX ORDER — LEVOTHYROXINE SODIUM 0.1 MG/1
100 TABLET ORAL DAILY
Qty: 90 TABLET | Refills: 3 | Status: SHIPPED | OUTPATIENT
Start: 2018-08-28 | End: 2019-05-28 | Stop reason: SDUPTHER

## 2018-08-28 NOTE — TELEPHONE ENCOUNTER
"----- Message from Sylvia Stover sent at 8/27/2018  9:54 PM EDT -----  Regarding: Prescription Question  Contact: [phone]  Once again, I am asking you to see why Dr. Cantrell has not approved a refill with [facility].   On 8/21 I requested a refill of Levothyroxin 100 mcg.  [facility] sent me an e-mail on 8/23 saying \"we contact ed your doctor again, etc.\"  I have no indication that he has yet approved the refill.  Sorry to ask you to spend your time on this matter but I do not have an e-mail address for him.   And even if I had an address I don't know if he would respond.      Thank you for your help.  Sylvia Stover  "

## 2018-08-31 ENCOUNTER — OFFICE VISIT (OUTPATIENT)
Dept: INTERNAL MEDICINE | Facility: CLINIC | Age: 78
End: 2018-08-31

## 2018-08-31 VITALS
HEART RATE: 46 BPM | OXYGEN SATURATION: 98 % | WEIGHT: 144 LBS | DIASTOLIC BLOOD PRESSURE: 80 MMHG | SYSTOLIC BLOOD PRESSURE: 122 MMHG | TEMPERATURE: 97.9 F | HEIGHT: 61 IN | BODY MASS INDEX: 27.19 KG/M2

## 2018-08-31 DIAGNOSIS — H10.31 ACUTE CONJUNCTIVITIS OF RIGHT EYE, UNSPECIFIED ACUTE CONJUNCTIVITIS TYPE: Primary | ICD-10-CM

## 2018-08-31 PROCEDURE — 99213 OFFICE O/P EST LOW 20 MIN: CPT | Performed by: NURSE PRACTITIONER

## 2018-08-31 RX ORDER — TOBRAMYCIN 3 MG/ML
2 SOLUTION/ DROPS OPHTHALMIC EVERY 8 HOURS SCHEDULED
Qty: 5 ML | Refills: 0 | Status: SHIPPED | OUTPATIENT
Start: 2018-08-31 | End: 2019-06-04

## 2018-09-01 NOTE — PROGRESS NOTES
Subjective   Sylvia Stover is a 78 y.o. female who presents due to right eye irritation    Eye Problem    The right eye is affected. This is a new problem. The current episode started in the past 7 days. The problem occurs daily. The problem has been gradually worsening. There was no injury mechanism. The patient is experiencing no pain. Associated symptoms include an eye discharge and eye redness. Pertinent negatives include no blurred vision, double vision, fever, foreign body sensation, photophobia or recent URI. She has tried nothing for the symptoms.        The following portions of the patient's history were reviewed and updated as appropriate: allergies, current medications, past social history and problem list.    Past Medical History:   Diagnosis Date   • Age related osteoporosis    • Anemia    • Chronic gastritis    • Chronic renal insufficiency    • Gastric ulcer    • GERD (gastroesophageal reflux disease)    • Greater trochanteric bursitis    • H/O bone density study 11/2012    Osteopenia   • H/O mammogram 10/28/2015    stable   • Hyperlipidemia    • Hypertension    • Hypothyroidism    • Lumbago    • Osteoarthrosis    • PONV (postoperative nausea and vomiting)          Current Outpatient Prescriptions:   •  amLODIPine (NORVASC) 2.5 MG tablet, Take 1 tablet by mouth Daily., Disp: 90 tablet, Rfl: 1  •  aspirin 81 MG tablet, Take 81 mg by mouth daily., Disp: , Rfl:   •  budesonide (PULMICORT) 90 MCG/ACT inhaler, Inhale 1 puff 2 (Two) Times a Day. Rinse and spit after use, Disp: 3 inhaler, Rfl: 1  •  Cholecalciferol (VITAMIN D) 2000 UNITS capsule, Take  by mouth., Disp: , Rfl:   •  denosumab (PROLIA) 60 MG/ML solution syringe, Inject  under the skin. Every 6 months, Disp: , Rfl:   •  diclofenac (VOLTAREN) 1 % gel gel, Apply 4 g topically 4 (Four) Times a Day As Needed (pain)., Disp: 300 g, Rfl: 1  •  famotidine (PEPCID) 20 MG tablet, Take 20 mg by mouth 2 (Two) Times a Day., Disp: , Rfl:   •  ferrous sulfate  "325 (65 FE) MG tablet, Take 325 mg by mouth Daily With Breakfast., Disp: , Rfl:   •  levothyroxine (SYNTHROID, LEVOTHROID) 100 MCG tablet, Take 1 tablet by mouth Daily., Disp: 90 tablet, Rfl: 3  •  lisinopril (PRINIVIL,ZESTRIL) 20 MG tablet, Take 1 tablet by mouth 2 (Two) Times a Day., Disp: 180 tablet, Rfl: 3  •  meclizine (ANTIVERT) 12.5 MG tablet, Take 1 tablet by mouth 3 (Three) Times a Day As Needed for dizziness. 1-2 tablets every 8 hours as needed, Disp: 30 tablet, Rfl: 0  •  metoprolol tartrate (LOPRESSOR) 50 MG tablet, Take 1.5 tablets by mouth two times daily, Disp: 270 tablet, Rfl: 3  •  Multiple Vitamins-Minerals (MULTIVITAMIN PO), Take  by mouth., Disp: , Rfl:   •  simvastatin (ZOCOR) 20 MG tablet, Take 1 tablet by mouth every night at bedtime., Disp: 90 tablet, Rfl: 3  •  tobramycin 0.3 % solution ophthalmic solution, Administer 2 drops to the right eye Every 8 (Eight) Hours., Disp: 5 mL, Rfl: 0    Allergies   Allergen Reactions   • Codeine        Review of Systems   Constitutional: Negative for chills, fatigue, fever and unexpected weight change.   HENT: Negative for congestion, ear pain, postnasal drip, sinus pressure, sore throat and trouble swallowing.    Eyes: Positive for discharge and redness. Negative for blurred vision, double vision, photophobia, pain and visual disturbance.   Respiratory: Negative for cough, chest tightness and wheezing.    Cardiovascular: Negative for chest pain, palpitations and leg swelling.   Gastrointestinal: Negative for abdominal pain.   Genitourinary: Negative for dysuria.   Musculoskeletal: Negative for arthralgias and joint swelling.   Skin: Negative for color change.   Neurological: Negative for headaches.       Objective   Vitals:    08/31/18 1456   BP: 122/80   BP Location: Left arm   Patient Position: Sitting   Cuff Size: Adult   Pulse: (!) 46   Temp: 97.9 °F (36.6 °C)   TempSrc: Oral   SpO2: 98%   Weight: 65.3 kg (144 lb)   Height: 154.9 cm (61\")     Physical " Exam   Constitutional: She appears well-developed and well-nourished. She is cooperative. She does not have a sickly appearance. She does not appear ill.   HENT:   Head: Normocephalic.   Right Ear: Hearing, tympanic membrane and external ear normal.   Left Ear: Hearing, tympanic membrane and external ear normal.   Nose: Nose normal. No mucosal edema, rhinorrhea, sinus tenderness or nasal deformity. Right sinus exhibits no maxillary sinus tenderness and no frontal sinus tenderness. Left sinus exhibits no maxillary sinus tenderness and no frontal sinus tenderness.   Mouth/Throat: Oropharynx is clear and moist and mucous membranes are normal. Normal dentition.   Eyes: Pupils are equal, round, and reactive to light. EOM and lids are normal. Right eye exhibits discharge. Right eye exhibits no exudate. Left eye exhibits no discharge and no exudate. Right conjunctiva is injected.   Neck: Trachea normal. Carotid bruit is not present. No edema present. No thyroid mass present.   Cardiovascular: Regular rhythm, normal heart sounds and normal pulses.    No murmur heard.  Pulmonary/Chest: Breath sounds normal. No respiratory distress. She has no decreased breath sounds. She has no wheezes. She has no rhonchi. She has no rales.   Lymphadenopathy:        Head (right side): No submental, no submandibular, no tonsillar, no preauricular, no posterior auricular and no occipital adenopathy present.        Head (left side): No submental, no submandibular, no tonsillar, no preauricular, no posterior auricular and no occipital adenopathy present.   Neurological: She is alert.   Skin: Skin is warm, dry and intact. No cyanosis. Nails show no clubbing.       Assessment/Plan   Sylvia was seen today for eye problem.    Diagnoses and all orders for this visit:    Acute conjunctivitis of right eye, unspecified acute conjunctivitis type  -     tobramycin 0.3 % solution ophthalmic solution; Administer 2 drops to the right eye Every 8 (Eight)  Hours.    Apply warm compresses and begin eye drops, f/u with opth if sx persist/worsen (discussed).

## 2018-09-17 DIAGNOSIS — L20.9 ATOPIC DERMATITIS, UNSPECIFIED TYPE: Primary | ICD-10-CM

## 2018-10-03 ENCOUNTER — TELEPHONE (OUTPATIENT)
Dept: INTERNAL MEDICINE | Facility: CLINIC | Age: 78
End: 2018-10-03

## 2018-10-03 DIAGNOSIS — L20.9 ATOPIC DERMATITIS, UNSPECIFIED TYPE: ICD-10-CM

## 2018-10-03 NOTE — TELEPHONE ENCOUNTER
Called pt to inform her that i'm working on her PA for Crisaborole 2 % , will call pt back to let her know if it's approved, if not jamie will try to E RX alternative for it to try.

## 2018-10-15 DIAGNOSIS — E78.5 HYPERLIPIDEMIA, UNSPECIFIED HYPERLIPIDEMIA TYPE: Primary | ICD-10-CM

## 2018-10-15 RX ORDER — SIMVASTATIN 20 MG
20 TABLET ORAL
Qty: 90 TABLET | Refills: 3 | Status: SHIPPED | OUTPATIENT
Start: 2018-10-15 | End: 2019-09-13 | Stop reason: SDUPTHER

## 2018-11-05 ENCOUNTER — APPOINTMENT (OUTPATIENT)
Dept: WOMENS IMAGING | Facility: HOSPITAL | Age: 78
End: 2018-11-05

## 2018-11-05 PROCEDURE — 77063 BREAST TOMOSYNTHESIS BI: CPT | Performed by: RADIOLOGY

## 2018-11-05 PROCEDURE — MDREVIEWSP: Performed by: RADIOLOGY

## 2018-11-05 PROCEDURE — 77067 SCR MAMMO BI INCL CAD: CPT | Performed by: RADIOLOGY

## 2018-11-11 ENCOUNTER — TELEPHONE (OUTPATIENT)
Dept: INTERNAL MEDICINE | Facility: CLINIC | Age: 78
End: 2018-11-11

## 2018-11-11 NOTE — TELEPHONE ENCOUNTER
Her dentist, Dr. Taveras, called requested stopping Prolia therapy due to need for dental extraction. Reviewed recent bone density which showed improvement in T scores, will hold Prolia injections for now pending surgery. Will re-evaluate restarting at later date.

## 2018-11-19 DIAGNOSIS — I10 ESSENTIAL HYPERTENSION: ICD-10-CM

## 2018-11-20 RX ORDER — AMLODIPINE BESYLATE 2.5 MG/1
2.5 TABLET ORAL DAILY
Qty: 90 TABLET | Refills: 1 | Status: SHIPPED | OUTPATIENT
Start: 2018-11-20 | End: 2018-11-30 | Stop reason: SDUPTHER

## 2018-11-30 ENCOUNTER — OFFICE VISIT (OUTPATIENT)
Dept: INTERNAL MEDICINE | Facility: CLINIC | Age: 78
End: 2018-11-30

## 2018-11-30 VITALS
HEART RATE: 49 BPM | DIASTOLIC BLOOD PRESSURE: 70 MMHG | WEIGHT: 146 LBS | HEIGHT: 61 IN | SYSTOLIC BLOOD PRESSURE: 118 MMHG | OXYGEN SATURATION: 99 % | BODY MASS INDEX: 27.56 KG/M2

## 2018-11-30 DIAGNOSIS — E78.5 HYPERLIPIDEMIA, UNSPECIFIED HYPERLIPIDEMIA TYPE: ICD-10-CM

## 2018-11-30 DIAGNOSIS — M81.0 SENILE OSTEOPOROSIS: ICD-10-CM

## 2018-11-30 DIAGNOSIS — N18.30 STAGE 3 CHRONIC KIDNEY DISEASE (HCC): ICD-10-CM

## 2018-11-30 DIAGNOSIS — I10 ESSENTIAL HYPERTENSION: Primary | ICD-10-CM

## 2018-11-30 DIAGNOSIS — L84 CORN OF FOOT: ICD-10-CM

## 2018-11-30 DIAGNOSIS — E89.0 POSTOPERATIVE HYPOTHYROIDISM: ICD-10-CM

## 2018-11-30 LAB
ALBUMIN SERPL-MCNC: 4.5 G/DL (ref 3.5–5.2)
ALBUMIN/GLOB SERPL: 1.5 G/DL
ALP SERPL-CCNC: 42 U/L (ref 39–117)
ALT SERPL W P-5'-P-CCNC: 17 U/L (ref 1–33)
ANION GAP SERPL CALCULATED.3IONS-SCNC: 11.7 MMOL/L
AST SERPL-CCNC: 18 U/L (ref 1–32)
BASOPHILS # BLD AUTO: 0.07 10*3/MM3 (ref 0–0.2)
BASOPHILS NFR BLD AUTO: 1.1 % (ref 0–2)
BILIRUB SERPL-MCNC: 0.5 MG/DL (ref 0.1–1.2)
BUN BLD-MCNC: 26 MG/DL (ref 8–23)
BUN/CREAT SERPL: 19.1 (ref 7–25)
CALCIUM SPEC-SCNC: 9.8 MG/DL (ref 8.6–10.5)
CHLORIDE SERPL-SCNC: 99 MMOL/L (ref 98–107)
CHOLEST SERPL-MCNC: 135 MG/DL (ref 0–200)
CO2 SERPL-SCNC: 24.3 MMOL/L (ref 22–29)
CREAT BLD-MCNC: 1.36 MG/DL (ref 0.57–1)
DEPRECATED RDW RBC AUTO: 44.9 FL (ref 37–54)
EOSINOPHIL # BLD AUTO: 0.33 10*3/MM3 (ref 0–0.7)
EOSINOPHIL NFR BLD AUTO: 5.1 % (ref 0–5)
ERYTHROCYTE [DISTWIDTH] IN BLOOD BY AUTOMATED COUNT: 13.4 % (ref 11.5–15)
GFR SERPL CREATININE-BSD FRML MDRD: 38 ML/MIN/1.73
GLOBULIN UR ELPH-MCNC: 3 GM/DL
GLUCOSE BLD-MCNC: 90 MG/DL (ref 65–99)
HCT VFR BLD AUTO: 38.5 % (ref 34.1–44.9)
HDLC SERPL-MCNC: 47 MG/DL (ref 40–60)
HGB BLD-MCNC: 12.8 G/DL (ref 11.2–15.7)
LDLC SERPL CALC-MCNC: 63 MG/DL (ref 0–100)
LDLC/HDLC SERPL: 1.34 {RATIO}
LYMPHOCYTES # BLD AUTO: 0.81 10*3/MM3 (ref 0.8–7)
LYMPHOCYTES NFR BLD AUTO: 12.6 % (ref 10–60)
MCH RBC QN AUTO: 31.2 PG (ref 26–34)
MCHC RBC AUTO-ENTMCNC: 33.2 G/DL (ref 31–37)
MCV RBC AUTO: 93.9 FL (ref 80–100)
MONOCYTES # BLD AUTO: 0.65 10*3/MM3 (ref 0–1)
MONOCYTES NFR BLD AUTO: 10.1 % (ref 0–13)
NEUTROPHILS # BLD AUTO: 4.56 10*3/MM3 (ref 1–11)
NEUTROPHILS NFR BLD AUTO: 71.1 % (ref 30–85)
PLATELET # BLD AUTO: 219 10*3/MM3 (ref 150–450)
PMV BLD AUTO: 9.4 FL (ref 6–12)
POTASSIUM BLD-SCNC: 4.2 MMOL/L (ref 3.5–5.2)
PROT SERPL-MCNC: 7.5 G/DL (ref 6–8.5)
RBC # BLD AUTO: 4.1 10*6/MM3 (ref 3.93–5.22)
SODIUM BLD-SCNC: 135 MMOL/L (ref 136–145)
TRIGL SERPL-MCNC: 125 MG/DL (ref 0–150)
TSH SERPL DL<=0.05 MIU/L-ACNC: 0.31 MIU/ML (ref 0.27–4.2)
VLDLC SERPL-MCNC: 25 MG/DL (ref 5–40)
WBC NRBC COR # BLD: 6.42 10*3/MM3 (ref 5–10)

## 2018-11-30 PROCEDURE — G0439 PPPS, SUBSEQ VISIT: HCPCS | Performed by: NURSE PRACTITIONER

## 2018-11-30 PROCEDURE — 36415 COLL VENOUS BLD VENIPUNCTURE: CPT | Performed by: NURSE PRACTITIONER

## 2018-11-30 PROCEDURE — 99213 OFFICE O/P EST LOW 20 MIN: CPT | Performed by: NURSE PRACTITIONER

## 2018-11-30 PROCEDURE — 80061 LIPID PANEL: CPT | Performed by: NURSE PRACTITIONER

## 2018-11-30 PROCEDURE — 84443 ASSAY THYROID STIM HORMONE: CPT | Performed by: NURSE PRACTITIONER

## 2018-11-30 PROCEDURE — 80053 COMPREHEN METABOLIC PANEL: CPT | Performed by: NURSE PRACTITIONER

## 2018-11-30 PROCEDURE — 85025 COMPLETE CBC W/AUTO DIFF WBC: CPT | Performed by: NURSE PRACTITIONER

## 2018-12-02 NOTE — PROGRESS NOTES
Subjective   Sylvia Stover is a 78 y.o. female who presents for f/u regarding HTN, GERD and hypothyroidism.    Her recent bone density showed improvement in bone density, scores more consistent with osteopenia. Her endodontist, Dr. Taveras, is evaluating her for possible dental surgery. We have agreed to hold injections for now if surgery is needed.      Hypertension   This is a chronic problem. Episode onset: years ago. The problem has been gradually worsening since onset. The problem is uncontrolled. Associated symptoms include neck pain. Pertinent negatives include no anxiety, chest pain, headaches, orthopnea, palpitations, peripheral edema or shortness of breath. There are no associated agents to hypertension. Current antihypertension treatment includes calcium channel blockers and beta blockers. The current treatment provides moderate improvement. There are no compliance problems.  Hypertensive end-organ damage includes kidney disease. Thyroid problem: hypothyroidism.   Hypothyroidism   This is a chronic problem. The current episode started more than 1 year ago. Associated symptoms include congestion, fatigue, myalgias (she has a chronic problem with episodic leg muscle cramps.) and neck pain. Pertinent negatives include no abdominal pain, arthralgias, chest pain, chills, coughing, fever, headaches, joint swelling, nausea, sore throat, vomiting or weakness. Urinary symptoms: nocturia: 2 to 4 times.        The following portions of the patient's history were reviewed and updated as appropriate: allergies, current medications, past social history and problem list.    Past Medical History:   Diagnosis Date   • Age related osteoporosis    • Anemia    • Chronic gastritis    • Chronic renal insufficiency    • Gastric ulcer    • GERD (gastroesophageal reflux disease)    • Greater trochanteric bursitis    • H/O bone density study 11/2012    Osteopenia   • H/O mammogram 10/28/2015    stable   • Hyperlipidemia    •  Hypertension    • Hypothyroidism    • Lumbago    • Osteoarthrosis    • PONV (postoperative nausea and vomiting)          Current Outpatient Medications:   •  amLODIPine (NORVASC) 2.5 MG tablet, Take 1 tablet by mouth Daily., Disp: 90 tablet, Rfl: 1  •  aspirin 81 MG tablet, Take 81 mg by mouth daily., Disp: , Rfl:   •  budesonide (PULMICORT) 90 MCG/ACT inhaler, Inhale 1 puff 2 (Two) Times a Day. Rinse and spit after use, Disp: 3 inhaler, Rfl: 1  •  Cholecalciferol (VITAMIN D) 2000 UNITS capsule, Take  by mouth., Disp: , Rfl:   •  Crisaborole 2 % ointment, Apply 1 Units topically 2 (Two) Times a Day., Disp: 60 g, Rfl: 1  •  denosumab (PROLIA) 60 MG/ML solution syringe, Inject  under the skin. Every 6 months, Disp: , Rfl:   •  diclofenac (VOLTAREN) 1 % gel gel, Apply 4 g topically 4 (Four) Times a Day As Needed (pain)., Disp: 300 g, Rfl: 1  •  famotidine (PEPCID) 20 MG tablet, Take 20 mg by mouth 2 (Two) Times a Day., Disp: , Rfl:   •  ferrous sulfate 325 (65 FE) MG tablet, Take 325 mg by mouth Daily With Breakfast., Disp: , Rfl:   •  levothyroxine (SYNTHROID, LEVOTHROID) 100 MCG tablet, Take 1 tablet by mouth Daily., Disp: 90 tablet, Rfl: 3  •  lisinopril (PRINIVIL,ZESTRIL) 20 MG tablet, Take 1 tablet by mouth 2 (Two) Times a Day., Disp: 180 tablet, Rfl: 3  •  meclizine (ANTIVERT) 12.5 MG tablet, Take 1 tablet by mouth 3 (Three) Times a Day As Needed for dizziness. 1-2 tablets every 8 hours as needed, Disp: 30 tablet, Rfl: 0  •  metoprolol tartrate (LOPRESSOR) 50 MG tablet, Take 1.5 tablets by mouth two times daily, Disp: 270 tablet, Rfl: 3  •  Multiple Vitamins-Minerals (MULTIVITAMIN PO), Take  by mouth., Disp: , Rfl:   •  simvastatin (ZOCOR) 20 MG tablet, Take 1 tablet by mouth every night at bedtime., Disp: 90 tablet, Rfl: 3  •  tobramycin 0.3 % solution ophthalmic solution, Administer 2 drops to the right eye Every 8 (Eight) Hours., Disp: 5 mL, Rfl: 0    Allergies   Allergen Reactions   • Codeine        Review of  "Systems   Constitutional: Positive for fatigue. Negative for chills, fever and unexpected weight change.   HENT: Positive for congestion. Negative for ear pain, postnasal drip, sinus pressure, sore throat and trouble swallowing.    Eyes: Negative for visual disturbance.   Respiratory: Negative for cough, chest tightness, shortness of breath and wheezing.    Cardiovascular: Negative for chest pain, palpitations, orthopnea and leg swelling.   Gastrointestinal: Negative for abdominal pain, blood in stool, nausea and vomiting.   Genitourinary: Negative for dysuria, frequency and urgency.   Musculoskeletal: Positive for myalgias (she has a chronic problem with episodic leg muscle cramps.), neck pain and neck stiffness. Negative for arthralgias and joint swelling.        Problems with corn on her left foot (has seen podiatry)   Skin: Negative for color change.   Neurological: Negative for syncope, weakness and headaches.   Hematological: Does not bruise/bleed easily.       Objective   Vitals:    11/30/18 1011   BP: 118/70   BP Location: Left arm   Patient Position: Sitting   Cuff Size: Adult   Pulse: (!) 49   SpO2: 99%   Weight: 66.2 kg (146 lb)   Height: 154.9 cm (61\")     Physical Exam   Constitutional: She appears well-developed and well-nourished. She is cooperative. She does not have a sickly appearance. She does not appear ill.   HENT:   Head: Normocephalic.   Right Ear: Hearing, tympanic membrane and external ear normal.   Left Ear: Hearing, tympanic membrane and external ear normal.   Nose: Nose normal. No mucosal edema, rhinorrhea, sinus tenderness or nasal deformity. Right sinus exhibits no maxillary sinus tenderness and no frontal sinus tenderness. Left sinus exhibits no maxillary sinus tenderness and no frontal sinus tenderness.   Mouth/Throat: Oropharynx is clear and moist and mucous membranes are normal. Normal dentition.   Eyes: Conjunctivae and lids are normal. Right eye exhibits no discharge and no " exudate. Left eye exhibits no discharge and no exudate.   Neck: Trachea normal. Carotid bruit is not present. No edema present. No thyroid mass present.   Cardiovascular: Regular rhythm, normal heart sounds and normal pulses.   No murmur heard.  Pulmonary/Chest: Breath sounds normal. No respiratory distress. She has no decreased breath sounds. She has no wheezes. She has no rhonchi. She has no rales.   Abdominal: Soft. There is no tenderness.   Lymphadenopathy:        Head (right side): No submental, no submandibular, no tonsillar, no preauricular, no posterior auricular and no occipital adenopathy present.        Head (left side): No submental, no submandibular, no tonsillar, no preauricular, no posterior auricular and no occipital adenopathy present.   Neurological: She is alert.   Skin: Skin is warm, dry and intact. No cyanosis. Nails show no clubbing.       Assessment/Plan   Sylvia was seen today for medicare wellness-initial visit.    Diagnoses and all orders for this visit:    Essential hypertension  Comments:  stable on current meds, goal <140/90  Orders:  -     CBC Auto Differential; Future  -     Comprehensive Metabolic Panel; Future  -     CBC Auto Differential  -     Comprehensive Metabolic Panel    Hyperlipidemia, unspecified hyperlipidemia type  Comments:  tolerating Simvastatin without myalgias  Orders:  -     Lipid Panel; Future  -     Lipid Panel    Postoperative hypothyroidism  Comments:  recheck TSH  Orders:  -     TSH; Future  -     TSH    Senile osteoporosis  Comments:  recent bone density showed osteopenia, continue Prolia injections (unless oral procedure needed)    Stage 3 chronic kidney disease (CMS/HCC)  Comments:  followed by Dr. Spence    Discussed Shingrix vaccine, she will check with pharmacy regarding coverage and availability.

## 2018-12-10 ENCOUNTER — APPOINTMENT (OUTPATIENT)
Dept: ONCOLOGY | Facility: HOSPITAL | Age: 78
End: 2018-12-10

## 2018-12-10 DIAGNOSIS — I65.23 CAROTID ARTERY PLAQUE, BILATERAL: Primary | ICD-10-CM

## 2018-12-28 ENCOUNTER — HOSPITAL ENCOUNTER (OUTPATIENT)
Dept: CARDIOLOGY | Facility: HOSPITAL | Age: 78
Discharge: HOME | End: 2018-12-28
Admitting: NURSE PRACTITIONER

## 2018-12-28 DIAGNOSIS — I65.23 CAROTID ARTERY PLAQUE, BILATERAL: ICD-10-CM

## 2018-12-28 LAB
BH CV XLRA MEAS LEFT DIST CCA EDV: 13 CM/SEC
BH CV XLRA MEAS LEFT DIST CCA PSV: 39 CM/SEC
BH CV XLRA MEAS LEFT DIST ICA EDV: 20 CM/SEC
BH CV XLRA MEAS LEFT DIST ICA PSV: 50 CM/SEC
BH CV XLRA MEAS LEFT ICA/CCA RATIO: 1.2
BH CV XLRA MEAS LEFT MID ICA EDV: 15 CM/SEC
BH CV XLRA MEAS LEFT MID ICA PSV: 41 CM/SEC
BH CV XLRA MEAS LEFT PROX CCA EDV: 13 CM/SEC
BH CV XLRA MEAS LEFT PROX CCA PSV: 53 CM/SEC
BH CV XLRA MEAS LEFT PROX ECA EDV: 7 CM/SEC
BH CV XLRA MEAS LEFT PROX ECA PSV: 35 CM/SEC
BH CV XLRA MEAS LEFT PROX ICA EDV: 18 CM/SEC
BH CV XLRA MEAS LEFT PROX ICA PSV: 47 CM/SEC
BH CV XLRA MEAS LEFT PROX SCLA PSV: 83 CM/SEC
BH CV XLRA MEAS LEFT VERTEBRAL A EDV: 10 CM/SEC
BH CV XLRA MEAS LEFT VERTEBRAL A PSV: 37 CM/SEC
BH CV XLRA MEAS RIGHT DIST CCA EDV: 14 CM/SEC
BH CV XLRA MEAS RIGHT DIST CCA PSV: 50 CM/SEC
BH CV XLRA MEAS RIGHT DIST ICA EDV: 15 CM/SEC
BH CV XLRA MEAS RIGHT DIST ICA PSV: 46 CM/SEC
BH CV XLRA MEAS RIGHT ICA/CCA RATIO: 1
BH CV XLRA MEAS RIGHT MID ICA EDV: 24 CM/SEC
BH CV XLRA MEAS RIGHT MID ICA PSV: 65 CM/SEC
BH CV XLRA MEAS RIGHT PROX CCA EDV: 13 CM/SEC
BH CV XLRA MEAS RIGHT PROX CCA PSV: 52 CM/SEC
BH CV XLRA MEAS RIGHT PROX ECA EDV: 8 CM/SEC
BH CV XLRA MEAS RIGHT PROX ECA PSV: 42 CM/SEC
BH CV XLRA MEAS RIGHT PROX ICA EDV: 24 CM/SEC
BH CV XLRA MEAS RIGHT PROX ICA PSV: 54 CM/SEC
BH CV XLRA MEAS RIGHT PROX SCLA EDV: 15 CM/SEC
BH CV XLRA MEAS RIGHT PROX SCLA PSV: 55 CM/SEC
BH CV XLRA MEAS RIGHT VERTEBRAL A EDV: 10 CM/SEC
BH CV XLRA MEAS RIGHT VERTEBRAL A PSV: 26 CM/SEC
LEFT ARM BP: NORMAL MMHG
RIGHT ARM BP: NORMAL MMHG

## 2018-12-28 PROCEDURE — 93880 EXTRACRANIAL BILAT STUDY: CPT

## 2019-03-30 DIAGNOSIS — I10 ESSENTIAL HYPERTENSION: ICD-10-CM

## 2019-04-01 RX ORDER — METOPROLOL TARTRATE 50 MG/1
TABLET, FILM COATED ORAL
Qty: 270 TABLET | Refills: 3 | Status: SHIPPED | OUTPATIENT
Start: 2019-04-01 | End: 2019-04-11 | Stop reason: SDUPTHER

## 2019-04-11 DIAGNOSIS — I10 ESSENTIAL HYPERTENSION: ICD-10-CM

## 2019-04-11 RX ORDER — METOPROLOL TARTRATE 50 MG/1
TABLET, FILM COATED ORAL
Qty: 270 TABLET | Refills: 3 | Status: SHIPPED | OUTPATIENT
Start: 2019-04-11 | End: 2019-04-12 | Stop reason: SDUPTHER

## 2019-04-12 DIAGNOSIS — I10 ESSENTIAL HYPERTENSION: ICD-10-CM

## 2019-04-12 RX ORDER — METOPROLOL TARTRATE 50 MG/1
TABLET, FILM COATED ORAL
Qty: 30 TABLET | Refills: 0 | Status: SHIPPED | OUTPATIENT
Start: 2019-04-12 | End: 2019-12-04 | Stop reason: SDUPTHER

## 2019-05-23 DIAGNOSIS — I10 ESSENTIAL HYPERTENSION: ICD-10-CM

## 2019-05-23 RX ORDER — AMLODIPINE BESYLATE 2.5 MG/1
2.5 TABLET ORAL DAILY
Qty: 90 TABLET | Refills: 1 | Status: SHIPPED | OUTPATIENT
Start: 2019-05-23 | End: 2019-07-21 | Stop reason: SDUPTHER

## 2019-05-28 DIAGNOSIS — E03.9 ACQUIRED HYPOTHYROIDISM: ICD-10-CM

## 2019-05-28 RX ORDER — LEVOTHYROXINE SODIUM 0.1 MG/1
100 TABLET ORAL DAILY
Qty: 90 TABLET | Refills: 3 | Status: SHIPPED | OUTPATIENT
Start: 2019-05-28 | End: 2019-12-04 | Stop reason: SDUPTHER

## 2019-05-28 RX ORDER — LISINOPRIL 20 MG/1
TABLET ORAL
Qty: 180 TABLET | Refills: 3 | Status: SHIPPED | OUTPATIENT
Start: 2019-05-28 | End: 2020-02-25 | Stop reason: SDUPTHER

## 2019-06-04 ENCOUNTER — OFFICE VISIT (OUTPATIENT)
Dept: INTERNAL MEDICINE | Facility: CLINIC | Age: 79
End: 2019-06-04

## 2019-06-04 VITALS
WEIGHT: 142 LBS | HEART RATE: 44 BPM | DIASTOLIC BLOOD PRESSURE: 80 MMHG | BODY MASS INDEX: 26.81 KG/M2 | SYSTOLIC BLOOD PRESSURE: 124 MMHG | HEIGHT: 61 IN | OXYGEN SATURATION: 99 %

## 2019-06-04 DIAGNOSIS — E89.0 POSTOPERATIVE HYPOTHYROIDISM: ICD-10-CM

## 2019-06-04 DIAGNOSIS — N18.30 STAGE 3 CHRONIC KIDNEY DISEASE (HCC): ICD-10-CM

## 2019-06-04 DIAGNOSIS — I10 ESSENTIAL HYPERTENSION: Primary | ICD-10-CM

## 2019-06-04 DIAGNOSIS — E78.5 HYPERLIPIDEMIA, UNSPECIFIED HYPERLIPIDEMIA TYPE: ICD-10-CM

## 2019-06-04 PROCEDURE — 99214 OFFICE O/P EST MOD 30 MIN: CPT | Performed by: NURSE PRACTITIONER

## 2019-06-05 NOTE — PROGRESS NOTES
Subjective   Sylvia Stover is a 79 y.o. female.         Her Prolia injections are currently on hold due to impending dental work through her periodontist.  She has brought labs from 4/25/2019 at her appt with Dr. Spence which show a stable Cr of 1.4 (actually improved from previous labs). Her Vitamin D is also stable at 54.1.  She reports feeling well, her heartrate remains in high 40-50s but she is feeling well, denies dizziness/lightheadedness.  She reports infrequent use of Pulmicort inh.   She has completed the Shingrix vaccine series.        Hypertension   This is a chronic problem. The current episode started more than 1 year ago. The problem is unchanged. The problem is controlled. Pertinent negatives include no chest pain, headaches or palpitations. Current antihypertension treatment includes beta blockers. The current treatment provides significant improvement. There are no compliance problems.    Hyperlipidemia   This is a chronic problem. The current episode started more than 1 year ago. The problem is controlled. Recent lipid tests were reviewed and are low. Exacerbating diseases include hypothyroidism. She has no history of diabetes. Pertinent negatives include no chest pain. Current antihyperlipidemic treatment includes statins. The current treatment provides significant improvement of lipids. There are no compliance problems (denies myalgias with Simvastatin).         The following portions of the patient's history were reviewed and updated as appropriate: allergies, current medications, past social history and problem list.    Past Medical History:   Diagnosis Date   • Age related osteoporosis    • Anemia    • Chronic gastritis    • Chronic renal insufficiency    • Gastric ulcer    • GERD (gastroesophageal reflux disease)    • Greater trochanteric bursitis    • H/O bone density study 11/2012    Osteopenia   • H/O mammogram 10/28/2015    stable   • Hyperlipidemia    • Hypertension    • Hypothyroidism     • Lumbago    • Osteoarthrosis    • PONV (postoperative nausea and vomiting)          Current Outpatient Medications:   •  amLODIPine (NORVASC) 2.5 MG tablet, TAKE 1 TABLET BY MOUTH  DAILY, Disp: 90 tablet, Rfl: 1  •  aspirin 81 MG tablet, Take 81 mg by mouth daily., Disp: , Rfl:   •  budesonide (PULMICORT) 90 MCG/ACT inhaler, Inhale 1 puff 2 (Two) Times a Day. Rinse and spit after use, Disp: 3 inhaler, Rfl: 1  •  Cholecalciferol (VITAMIN D) 2000 UNITS capsule, Take  by mouth., Disp: , Rfl:   •  Crisaborole 2 % ointment, Apply 1 Units topically 2 (Two) Times a Day., Disp: 60 g, Rfl: 1  •  denosumab (PROLIA) 60 MG/ML solution syringe, Inject  under the skin. Every 6 months, Disp: , Rfl:   •  diclofenac (VOLTAREN) 1 % gel gel, Apply 4 g topically 4 (Four) Times a Day As Needed (pain)., Disp: 300 g, Rfl: 1  •  famotidine (PEPCID) 20 MG tablet, Take 20 mg by mouth 2 (Two) Times a Day., Disp: , Rfl:   •  ferrous sulfate 325 (65 FE) MG tablet, Take 325 mg by mouth Daily With Breakfast., Disp: , Rfl:   •  levothyroxine (SYNTHROID, LEVOTHROID) 100 MCG tablet, TAKE 1 TABLET BY MOUTH  DAILY, Disp: 90 tablet, Rfl: 3  •  lisinopril (PRINIVIL,ZESTRIL) 20 MG tablet, TAKE 1 TABLET BY MOUTH TWO  TIMES DAILY, Disp: 180 tablet, Rfl: 3  •  meclizine (ANTIVERT) 12.5 MG tablet, Take 1 tablet by mouth 3 (Three) Times a Day As Needed for dizziness. 1-2 tablets every 8 hours as needed, Disp: 30 tablet, Rfl: 0  •  metoprolol tartrate (LOPRESSOR) 50 MG tablet, Take 1.5 tablets by mouth two times daily, Disp: 30 tablet, Rfl: 0  •  Multiple Vitamins-Minerals (MULTIVITAMIN PO), Take  by mouth., Disp: , Rfl:   •  simvastatin (ZOCOR) 20 MG tablet, Take 1 tablet by mouth every night at bedtime., Disp: 90 tablet, Rfl: 3    Allergies   Allergen Reactions   • Codeine        Review of Systems   Constitutional: Negative for chills, fatigue, fever and unexpected weight change.   HENT: Positive for congestion and postnasal drip. Negative for ear pain,  "sinus pressure, sore throat and trouble swallowing.    Eyes: Negative for visual disturbance.   Respiratory: Negative for cough, chest tightness and wheezing.    Cardiovascular: Negative for chest pain, palpitations and leg swelling.   Gastrointestinal: Negative for abdominal pain, blood in stool, nausea and vomiting.   Genitourinary: Negative for dysuria, frequency and urgency.   Musculoskeletal: Positive for arthralgias (hx OA in right knee). Negative for joint swelling.   Skin: Negative for color change.   Neurological: Negative for syncope, weakness and headaches.   Hematological: Does not bruise/bleed easily.       Objective   Vitals:    06/04/19 1314   BP: 124/80   BP Location: Left arm   Patient Position: Sitting   Cuff Size: Adult   Pulse: (!) 44   SpO2: 99%   Weight: 64.4 kg (142 lb)   Height: 154.9 cm (61\")     Physical Exam   Constitutional: She appears well-developed and well-nourished. She is cooperative. She does not have a sickly appearance. She does not appear ill.   HENT:   Head: Normocephalic.   Right Ear: Hearing, tympanic membrane and external ear normal.   Left Ear: Hearing, tympanic membrane and external ear normal.   Nose: Nose normal. No mucosal edema, rhinorrhea, sinus tenderness or nasal deformity. Right sinus exhibits no maxillary sinus tenderness and no frontal sinus tenderness. Left sinus exhibits no maxillary sinus tenderness and no frontal sinus tenderness.   Mouth/Throat: Mucous membranes are normal. Normal dentition. Posterior oropharyngeal erythema present.   Eyes: Conjunctivae and lids are normal. Pupils are equal, round, and reactive to light. Right eye exhibits no discharge and no exudate. Left eye exhibits no discharge and no exudate.   Neck: Trachea normal and normal range of motion. Carotid bruit is not present. No edema present. No thyroid mass and no thyromegaly present.   Cardiovascular: Regular rhythm, normal heart sounds and normal pulses.   No murmur " heard.  Pulmonary/Chest: Breath sounds normal. No respiratory distress. She has no decreased breath sounds. She has no wheezes. She has no rhonchi. She has no rales.   Abdominal: Soft. Normal appearance and bowel sounds are normal. There is no tenderness.   Lymphadenopathy:        Head (right side): No submental, no submandibular, no tonsillar, no preauricular, no posterior auricular and no occipital adenopathy present.        Head (left side): No submental, no submandibular, no tonsillar, no preauricular, no posterior auricular and no occipital adenopathy present.   Neurological: She is alert.   Skin: Skin is warm, dry and intact. No cyanosis. Nails show no clubbing.       Assessment/Plan   Sylvia was seen today for hypertension and hyperlipidemia.    Diagnoses and all orders for this visit:    Essential hypertension  Comments:  heartrate remains low (40-50s) but she is feeling well & without side effects, will continue to monitor    Postoperative hypothyroidism  Comments:  TSH 11/30/18 0.311    Hyperlipidemia, unspecified hyperlipidemia type  Comments:  denies myalgias with Simvastatin, LDL 63 with 11/2018 labs    Stage 3 chronic kidney disease (CMS/HCC)  Comments:  followed by Dr. Spence annually    Reviewed labs from 4/25/19 from nephrology which do not show any acute abnormalities, will recheck labs at next visit.  She has discussed her ASA 81mg daily with Dr. Spence who would like her to continue supplement for renal protection.

## 2019-07-21 DIAGNOSIS — I10 ESSENTIAL HYPERTENSION: ICD-10-CM

## 2019-07-22 RX ORDER — AMLODIPINE BESYLATE 2.5 MG/1
2.5 TABLET ORAL DAILY
Qty: 90 TABLET | Refills: 1 | Status: SHIPPED | OUTPATIENT
Start: 2019-07-22 | End: 2019-12-04 | Stop reason: SDUPTHER

## 2019-09-13 DIAGNOSIS — E78.5 HYPERLIPIDEMIA, UNSPECIFIED HYPERLIPIDEMIA TYPE: ICD-10-CM

## 2019-09-13 RX ORDER — SIMVASTATIN 20 MG
20 TABLET ORAL
Qty: 90 TABLET | Refills: 3 | Status: SHIPPED | OUTPATIENT
Start: 2019-09-13 | End: 2019-12-04 | Stop reason: SDUPTHER

## 2019-10-07 ENCOUNTER — CLINICAL SUPPORT (OUTPATIENT)
Dept: INTERNAL MEDICINE | Facility: CLINIC | Age: 79
End: 2019-10-07

## 2019-10-07 DIAGNOSIS — Z23 NEED FOR IMMUNIZATION AGAINST INFLUENZA: Primary | ICD-10-CM

## 2019-10-07 PROCEDURE — G0008 ADMIN INFLUENZA VIRUS VAC: HCPCS | Performed by: NURSE PRACTITIONER

## 2019-10-07 PROCEDURE — 90653 IIV ADJUVANT VACCINE IM: CPT | Performed by: NURSE PRACTITIONER

## 2019-10-22 ENCOUNTER — OFFICE VISIT (OUTPATIENT)
Dept: INTERNAL MEDICINE | Facility: CLINIC | Age: 79
End: 2019-10-22

## 2019-10-22 ENCOUNTER — APPOINTMENT (OUTPATIENT)
Dept: WOMENS IMAGING | Facility: HOSPITAL | Age: 79
End: 2019-10-22

## 2019-10-22 VITALS
WEIGHT: 146 LBS | BODY MASS INDEX: 27.56 KG/M2 | DIASTOLIC BLOOD PRESSURE: 88 MMHG | HEART RATE: 54 BPM | SYSTOLIC BLOOD PRESSURE: 136 MMHG | HEIGHT: 61 IN | OXYGEN SATURATION: 97 %

## 2019-10-22 DIAGNOSIS — M25.551 RIGHT HIP PAIN: Primary | ICD-10-CM
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
## 2019-10-22 DIAGNOSIS — M79.642 LEFT HAND PAIN: ICD-10-CM

## 2019-10-22 DIAGNOSIS — S20.219A CONTUSION OF CHEST WALL, UNSPECIFIED LATERALITY, INITIAL ENCOUNTER: ICD-10-CM

## 2019-10-22 PROCEDURE — 73130 X-RAY EXAM OF HAND: CPT | Performed by: RADIOLOGY

## 2019-10-22 PROCEDURE — 71046 X-RAY EXAM CHEST 2 VIEWS: CPT | Performed by: NURSE PRACTITIONER

## 2019-10-22 PROCEDURE — 71046 X-RAY EXAM CHEST 2 VIEWS: CPT | Performed by: RADIOLOGY

## 2019-10-22 PROCEDURE — 73502 X-RAY EXAM HIP UNI 2-3 VIEWS: CPT | Performed by: RADIOLOGY

## 2019-10-22 PROCEDURE — 99214 OFFICE O/P EST MOD 30 MIN: CPT | Performed by: NURSE PRACTITIONER

## 2019-10-22 PROCEDURE — 73502 X-RAY EXAM HIP UNI 2-3 VIEWS: CPT | Performed by: NURSE PRACTITIONER

## 2019-10-22 PROCEDURE — 73130 X-RAY EXAM OF HAND: CPT | Performed by: NURSE PRACTITIONER

## 2019-10-24 NOTE — PROGRESS NOTES
Subjective   Sylvia Stover is a 79 y.o. female who presents due to left hand, back and right hip pain after a fall.    She was walking down her stairs today when she missed the last 6-7 bottom stairs and fell backwards. She did hit her head on the wall but denies loss of consciousness.  She complains of pain in her left hand as well as her right hip and low back since the injury.  She has taken some Tylenol and applied ice with mild improvement in symptoms.      Hand Injury    Pertinent negatives include no chest pain.   Back Pain   Pertinent negatives include no abdominal pain, chest pain, dysuria, fever, headaches or weakness.   Hip Pain           The following portions of the patient's history were reviewed and updated as appropriate: allergies, current medications, past social history and problem list.    Past Medical History:   Diagnosis Date   • Age related osteoporosis    • Anemia    • Chronic gastritis    • Chronic renal insufficiency    • Gastric ulcer    • GERD (gastroesophageal reflux disease)    • Greater trochanteric bursitis    • H/O bone density study 11/2012    Osteopenia   • H/O mammogram 10/28/2015    stable   • Hyperlipidemia    • Hypertension    • Hypothyroidism    • Lumbago    • Osteoarthrosis    • PONV (postoperative nausea and vomiting)          Current Outpatient Medications:   •  amLODIPine (NORVASC) 2.5 MG tablet, Take 1 tablet by mouth Daily., Disp: 90 tablet, Rfl: 1  •  aspirin 81 MG tablet, Take 81 mg by mouth daily., Disp: , Rfl:   •  budesonide (PULMICORT) 90 MCG/ACT inhaler, Inhale 1 puff 2 (Two) Times a Day. Rinse and spit after use, Disp: 3 inhaler, Rfl: 1  •  Cholecalciferol (VITAMIN D) 2000 UNITS capsule, Take  by mouth., Disp: , Rfl:   •  Crisaborole 2 % ointment, Apply 1 Units topically 2 (Two) Times a Day., Disp: 60 g, Rfl: 1  •  denosumab (PROLIA) 60 MG/ML solution syringe, Inject  under the skin. Every 6 months, Disp: , Rfl:   •  diclofenac (VOLTAREN) 1 % gel gel, Apply 4 g  topically 4 (Four) Times a Day As Needed (pain)., Disp: 300 g, Rfl: 1  •  famotidine (PEPCID) 20 MG tablet, Take 20 mg by mouth 2 (Two) Times a Day., Disp: , Rfl:   •  ferrous sulfate 325 (65 FE) MG tablet, Take 325 mg by mouth Daily With Breakfast., Disp: , Rfl:   •  levothyroxine (SYNTHROID, LEVOTHROID) 100 MCG tablet, TAKE 1 TABLET BY MOUTH  DAILY, Disp: 90 tablet, Rfl: 3  •  lisinopril (PRINIVIL,ZESTRIL) 20 MG tablet, TAKE 1 TABLET BY MOUTH TWO  TIMES DAILY, Disp: 180 tablet, Rfl: 3  •  meclizine (ANTIVERT) 12.5 MG tablet, Take 1 tablet by mouth 3 (Three) Times a Day As Needed for dizziness. 1-2 tablets every 8 hours as needed, Disp: 30 tablet, Rfl: 0  •  metoprolol tartrate (LOPRESSOR) 50 MG tablet, Take 1.5 tablets by mouth two times daily, Disp: 30 tablet, Rfl: 0  •  Multiple Vitamins-Minerals (MULTIVITAMIN PO), Take  by mouth., Disp: , Rfl:   •  simvastatin (ZOCOR) 20 MG tablet, TAKE 1 TABLET BY MOUTH  EVERY NIGHT AT BEDTIME, Disp: 90 tablet, Rfl: 3    Allergies   Allergen Reactions   • Codeine        Review of Systems   Constitutional: Negative for chills, fatigue, fever and unexpected weight change.   HENT: Negative for congestion, ear pain, postnasal drip, sinus pressure, sore throat and trouble swallowing.    Eyes: Negative for visual disturbance.   Respiratory: Negative for cough, chest tightness and wheezing.    Cardiovascular: Negative for chest pain, palpitations and leg swelling.   Gastrointestinal: Negative for abdominal pain, blood in stool, nausea and vomiting.   Genitourinary: Negative for dysuria, frequency and urgency.   Musculoskeletal: Positive for arthralgias and back pain. Negative for joint swelling.   Skin: Positive for rash (dry, scaly patches (using Eucrisa samples)). Negative for color change.   Neurological: Negative for dizziness, syncope, weakness, light-headedness and headaches.   Hematological: Does not bruise/bleed easily.       Objective   Vitals:    10/22/19 1500   BP: 136/88  "  BP Location: Left arm   Patient Position: Sitting   Cuff Size: Adult   Pulse: 54   SpO2: 97%   Weight: 66.2 kg (146 lb)   Height: 154.9 cm (61\")     Body mass index is 27.59 kg/m².  Physical Exam   Constitutional: She appears well-developed and well-nourished. She is cooperative. She does not have a sickly appearance. She does not appear ill.   HENT:   Head: Normocephalic.   Right Ear: Hearing, tympanic membrane and external ear normal.   Left Ear: Hearing, tympanic membrane and external ear normal.   Nose: Nose normal. No mucosal edema, rhinorrhea, sinus tenderness or nasal deformity. Right sinus exhibits no maxillary sinus tenderness and no frontal sinus tenderness. Left sinus exhibits no maxillary sinus tenderness and no frontal sinus tenderness.   Mouth/Throat: Oropharynx is clear and moist and mucous membranes are normal. Normal dentition.   Eyes: Conjunctivae and lids are normal. Right eye exhibits no discharge and no exudate. Left eye exhibits no discharge and no exudate.   Neck: Trachea normal. Carotid bruit is not present. No edema present. No thyroid mass present.   Cardiovascular: Regular rhythm, normal heart sounds and normal pulses.   No murmur heard.  Pulmonary/Chest: Breath sounds normal. No respiratory distress. She has no decreased breath sounds. She has no wheezes. She has no rhonchi. She has no rales. She exhibits tenderness (anterior chest wall tenderness, no abrasions).   Musculoskeletal:        Right hip: She exhibits tenderness (right anterior hip). She exhibits normal range of motion and normal strength.        Lumbar back: She exhibits no tenderness.        Left hand: She exhibits tenderness and swelling (diffuse swelling of left hand with ecchymosis, no open wounds or lacerations). She exhibits normal range of motion.   Lymphadenopathy:        Head (right side): No submental, no submandibular, no tonsillar, no preauricular, no posterior auricular and no occipital adenopathy present.       "  Head (left side): No submental, no submandibular, no tonsillar, no preauricular, no posterior auricular and no occipital adenopathy present.   Neurological: She is alert.   Skin: Skin is warm, dry and intact. No cyanosis. Nails show no clubbing.       Assessment/Plan   Sylvia was seen today for hand injury, back pain and hip pain.    Diagnoses and all orders for this visit:    Right hip pain  -     XR Hip With or Without Pelvis 2 - 3 View Right    Left hand pain  -     XR Hand 3+ View Left    Contusion of chest wall, unspecified laterality, initial encounter  -     XR Chest 2 View    Fall down stairs, initial encounter    No acute abnl noted on xrays-will send to radiology for report.  Discussed injuries with patient which appear more soft tissue in nature.  She will continue to apply ice and take Tylenol as needed for pain control.

## 2019-11-07 ENCOUNTER — APPOINTMENT (OUTPATIENT)
Dept: WOMENS IMAGING | Facility: HOSPITAL | Age: 79
End: 2019-11-07

## 2019-11-07 PROCEDURE — 77067 SCR MAMMO BI INCL CAD: CPT | Performed by: RADIOLOGY

## 2019-11-07 PROCEDURE — MDREVIEWSP: Performed by: RADIOLOGY

## 2019-11-07 PROCEDURE — 77063 BREAST TOMOSYNTHESIS BI: CPT | Performed by: RADIOLOGY

## 2019-12-04 ENCOUNTER — OFFICE VISIT (OUTPATIENT)
Dept: INTERNAL MEDICINE | Facility: CLINIC | Age: 79
End: 2019-12-04

## 2019-12-04 VITALS
OXYGEN SATURATION: 97 % | WEIGHT: 145 LBS | HEART RATE: 76 BPM | SYSTOLIC BLOOD PRESSURE: 124 MMHG | HEIGHT: 61 IN | BODY MASS INDEX: 27.38 KG/M2 | DIASTOLIC BLOOD PRESSURE: 82 MMHG

## 2019-12-04 DIAGNOSIS — I10 ESSENTIAL HYPERTENSION: Primary | ICD-10-CM

## 2019-12-04 DIAGNOSIS — M19.042 PRIMARY OSTEOARTHRITIS OF BOTH HANDS: ICD-10-CM

## 2019-12-04 DIAGNOSIS — E78.5 HYPERLIPIDEMIA, UNSPECIFIED HYPERLIPIDEMIA TYPE: ICD-10-CM

## 2019-12-04 DIAGNOSIS — M19.041 PRIMARY OSTEOARTHRITIS OF BOTH HANDS: ICD-10-CM

## 2019-12-04 DIAGNOSIS — N18.30 STAGE 3 CHRONIC KIDNEY DISEASE (HCC): ICD-10-CM

## 2019-12-04 DIAGNOSIS — E03.9 ACQUIRED HYPOTHYROIDISM: ICD-10-CM

## 2019-12-04 DIAGNOSIS — I10 ESSENTIAL HYPERTENSION: ICD-10-CM

## 2019-12-04 LAB
ALBUMIN SERPL-MCNC: 4.5 G/DL (ref 3.5–5.2)
ALBUMIN/GLOB SERPL: 1.4 G/DL
ALP SERPL-CCNC: 58 U/L (ref 39–117)
ALT SERPL W P-5'-P-CCNC: 15 U/L (ref 1–33)
ANION GAP SERPL CALCULATED.3IONS-SCNC: 18.3 MMOL/L (ref 5–15)
AST SERPL-CCNC: 18 U/L (ref 1–32)
BASOPHILS # BLD AUTO: 0.06 10*3/MM3 (ref 0–0.2)
BASOPHILS NFR BLD AUTO: 1 % (ref 0–1.5)
BILIRUB SERPL-MCNC: 0.4 MG/DL (ref 0.2–1.2)
BUN BLD-MCNC: 26 MG/DL (ref 8–23)
BUN/CREAT SERPL: 19.7 (ref 7–25)
CALCIUM SPEC-SCNC: 9.7 MG/DL (ref 8.6–10.5)
CHLORIDE SERPL-SCNC: 97 MMOL/L (ref 98–107)
CHOLEST SERPL-MCNC: 120 MG/DL (ref 0–200)
CO2 SERPL-SCNC: 22.7 MMOL/L (ref 22–29)
CREAT BLD-MCNC: 1.32 MG/DL (ref 0.57–1)
DEPRECATED RDW RBC AUTO: 43.6 FL (ref 37–54)
EOSINOPHIL # BLD AUTO: 0.36 10*3/MM3 (ref 0–0.4)
EOSINOPHIL NFR BLD AUTO: 6 % (ref 0.3–6.2)
ERYTHROCYTE [DISTWIDTH] IN BLOOD BY AUTOMATED COUNT: 13.1 % (ref 12.3–15.4)
GFR SERPL CREATININE-BSD FRML MDRD: 39 ML/MIN/1.73
GLOBULIN UR ELPH-MCNC: 3.2 GM/DL
GLUCOSE BLD-MCNC: 94 MG/DL (ref 65–99)
HCT VFR BLD AUTO: 36 % (ref 34–46.6)
HDLC SERPL-MCNC: 41 MG/DL (ref 40–60)
HGB BLD-MCNC: 12 G/DL (ref 12–15.9)
LDLC SERPL CALC-MCNC: 50 MG/DL (ref 0–100)
LDLC/HDLC SERPL: 1.22 {RATIO}
LYMPHOCYTES # BLD AUTO: 0.82 10*3/MM3 (ref 0.7–3.1)
LYMPHOCYTES NFR BLD AUTO: 13.8 % (ref 19.6–45.3)
MCH RBC QN AUTO: 31.4 PG (ref 26.6–33)
MCHC RBC AUTO-ENTMCNC: 33.3 G/DL (ref 31.5–35.7)
MCV RBC AUTO: 94.2 FL (ref 79–97)
MONOCYTES # BLD AUTO: 0.76 10*3/MM3 (ref 0.1–0.9)
MONOCYTES NFR BLD AUTO: 12.8 % (ref 5–12)
NEUTROPHILS # BLD AUTO: 3.96 10*3/MM3 (ref 1.7–7)
NEUTROPHILS NFR BLD AUTO: 66.4 % (ref 42.7–76)
PLATELET # BLD AUTO: 229 10*3/MM3 (ref 140–450)
PMV BLD AUTO: 9.4 FL (ref 6–12)
POTASSIUM BLD-SCNC: 4.5 MMOL/L (ref 3.5–5.2)
PROT SERPL-MCNC: 7.7 G/DL (ref 6–8.5)
RBC # BLD AUTO: 3.82 10*6/MM3 (ref 3.77–5.28)
SODIUM BLD-SCNC: 138 MMOL/L (ref 136–145)
TRIGL SERPL-MCNC: 144 MG/DL (ref 0–150)
TSH SERPL DL<=0.05 MIU/L-ACNC: 0.43 UIU/ML (ref 0.27–4.2)
VLDLC SERPL-MCNC: 28.8 MG/DL (ref 5–40)
WBC NRBC COR # BLD: 5.96 10*3/MM3 (ref 3.4–10.8)

## 2019-12-04 PROCEDURE — G0439 PPPS, SUBSEQ VISIT: HCPCS | Performed by: NURSE PRACTITIONER

## 2019-12-04 PROCEDURE — 36415 COLL VENOUS BLD VENIPUNCTURE: CPT | Performed by: NURSE PRACTITIONER

## 2019-12-04 PROCEDURE — 80061 LIPID PANEL: CPT | Performed by: NURSE PRACTITIONER

## 2019-12-04 PROCEDURE — 84443 ASSAY THYROID STIM HORMONE: CPT | Performed by: NURSE PRACTITIONER

## 2019-12-04 PROCEDURE — 85025 COMPLETE CBC W/AUTO DIFF WBC: CPT | Performed by: NURSE PRACTITIONER

## 2019-12-04 PROCEDURE — 80053 COMPREHEN METABOLIC PANEL: CPT | Performed by: NURSE PRACTITIONER

## 2019-12-04 PROCEDURE — 99214 OFFICE O/P EST MOD 30 MIN: CPT | Performed by: NURSE PRACTITIONER

## 2019-12-04 RX ORDER — SIMVASTATIN 20 MG
20 TABLET ORAL
Qty: 90 TABLET | Refills: 3 | Status: SHIPPED | OUTPATIENT
Start: 2019-12-04 | End: 2020-03-09 | Stop reason: SDUPTHER

## 2019-12-04 RX ORDER — AMLODIPINE BESYLATE 2.5 MG/1
2.5 TABLET ORAL DAILY
Qty: 90 TABLET | Refills: 3 | Status: SHIPPED | OUTPATIENT
Start: 2019-12-04 | End: 2020-09-08 | Stop reason: SDUPTHER

## 2019-12-04 RX ORDER — LEVOTHYROXINE SODIUM 0.1 MG/1
100 TABLET ORAL DAILY
Qty: 90 TABLET | Refills: 3 | Status: SHIPPED | OUTPATIENT
Start: 2019-12-04 | End: 2020-02-25 | Stop reason: SDUPTHER

## 2019-12-04 RX ORDER — METOPROLOL TARTRATE 50 MG/1
TABLET, FILM COATED ORAL
Qty: 270 TABLET | Refills: 3 | Status: SHIPPED | OUTPATIENT
Start: 2019-12-04 | End: 2020-02-24 | Stop reason: SDUPTHER

## 2019-12-04 RX ORDER — AMLODIPINE BESYLATE 2.5 MG/1
2.5 TABLET ORAL DAILY
Qty: 90 TABLET | Refills: 1 | Status: SHIPPED | OUTPATIENT
Start: 2019-12-04 | End: 2019-12-04 | Stop reason: SDUPTHER

## 2019-12-04 NOTE — PROGRESS NOTES
The ABCs of the Annual Wellness Visit  Subsequent Medicare Wellness Visit    Chief Complaint   Patient presents with   • Medicare Wellness-subsequent   • Hypertension   • Hypothyroidism   • Hyperlipidemia   • Arthritis       Subjective   History of Present Illness:  Sylvia Stover is a 79 y.o. female who presents for a Subsequent Medicare Wellness Visit.    HEALTH RISK ASSESSMENT    Recent Hospitalizations:  No hospitalization(s) within the last year.    Current Medical Providers:  Patient Care Team:  Rhoda Luna APRN as PCP - General (Internal Medicine)  Lucas Goldstein DPM as PCP - Claims Attributed  Jadiel Spence MD as Consulting Physician (Nephrology)    Smoking Status:  Social History     Tobacco Use   Smoking Status Former Smoker   • Packs/day: 0.25   • Types: Cigarettes   • Last attempt to quit:    • Years since quittin.9   Smokeless Tobacco Never Used       Alcohol Consumption:  Social History     Substance and Sexual Activity   Alcohol Use No       Depression Screen:   PHQ-2/PHQ-9 Depression Screening 2019   Little interest or pleasure in doing things 0   Feeling down, depressed, or hopeless 0   Trouble falling or staying asleep, or sleeping too much 0   Feeling tired or having little energy 0   Poor appetite or overeating 0   Feeling bad about yourself - or that you are a failure or have let yourself or your family down 0   Trouble concentrating on things, such as reading the newspaper or watching television 0   Moving or speaking so slowly that other people could have noticed. Or the opposite - being so fidgety or restless that you have been moving around a lot more than usual 0   Thoughts that you would be better off dead, or of hurting yourself in some way 0   Total Score 0   If you checked off any problems, how difficult have these problems made it for you to do your work, take care of things at home, or get along with other people? Not difficult at all       Fall Risk  Screen:  KY[facility] Fall Risk Assessment was completed, and patient is at MODERATE risk for falls. Assessment completed on:12/4/2019    Health Habits and Functional and Cognitive Screening:  Functional & Cognitive Status 12/4/2019   Do you have difficulty preparing food and eating? No   Do you have difficulty bathing yourself, getting dressed or grooming yourself? No   Do you have difficulty using the toilet? No   Do you have difficulty moving around from place to place? No   Do you have trouble with steps or getting out of a bed or a chair? No   Current Diet Well Balanced Diet   Dental Exam Up to date   Eye Exam Up to date   Exercise (times per week) 6 times per week   Current Exercise Activities Include Walking   Do you need help using the phone?  No   Are you deaf or do you have serious difficulty hearing?  Yes   Do you need help with transportation? No   Do you need help shopping? No   Do you need help preparing meals?  No   Do you need help with housework?  No   Do you need help with laundry? No   Do you need help taking your medications? No   Do you need help managing money? No   Do you ever drive or ride in a car without wearing a seat belt? No   Have you felt unusual stress, anger or loneliness in the last month? No   Who do you live with? Alone   If you need help, do you have trouble finding someone available to you? No   Have you been bothered in the last four weeks by sexual problems? No   Do you have difficulty concentrating, remembering or making decisions? No         Does the patient have evidence of cognitive impairment? No    Asprin use counseling:Taking ASA appropriately as indicated    Age-appropriate Screening Schedule:  Refer to the list below for future screening recommendations based on patient's age, sex and/or medical conditions. Orders for these recommended tests are listed in the plan section. The patient has been provided with a written plan.    Health Maintenance   Topic Date Due   • DXA SCAN   05/30/2020   • MAMMOGRAM  11/07/2020   • LIPID PANEL  12/04/2020   • TDAP/TD VACCINES (2 - Td) 03/12/2023   • COLONOSCOPY  05/12/2026   • PNEUMOCOCCAL VACCINE (65+ HIGH RISK)  Completed   • INFLUENZA VACCINE  Completed   • ZOSTER VACCINE  Completed          The following portions of the patient's history were reviewed and updated as appropriate: allergies, current medications, past family history, past medical history, past social history, past surgical history and problem list.    Outpatient Medications Prior to Visit   Medication Sig Dispense Refill   • aspirin 81 MG tablet Take 81 mg by mouth daily.     • budesonide (PULMICORT) 90 MCG/ACT inhaler Inhale 1 puff 2 (Two) Times a Day. Rinse and spit after use 3 inhaler 1   • Cholecalciferol (VITAMIN D) 2000 UNITS capsule Take  by mouth.     • Crisaborole 2 % ointment Apply 1 Units topically 2 (Two) Times a Day. 60 g 1   • denosumab (PROLIA) 60 MG/ML solution syringe Inject  under the skin. Every 6 months     • diclofenac (VOLTAREN) 1 % gel gel Apply 4 g topically 4 (Four) Times a Day As Needed (pain). 300 g 1   • famotidine (PEPCID) 20 MG tablet Take 20 mg by mouth 2 (Two) Times a Day.     • ferrous sulfate 325 (65 FE) MG tablet Take 325 mg by mouth Daily With Breakfast.     • lisinopril (PRINIVIL,ZESTRIL) 20 MG tablet TAKE 1 TABLET BY MOUTH TWO  TIMES DAILY 180 tablet 3   • meclizine (ANTIVERT) 12.5 MG tablet Take 1 tablet by mouth 3 (Three) Times a Day As Needed for dizziness. 1-2 tablets every 8 hours as needed 30 tablet 0   • Multiple Vitamins-Minerals (MULTIVITAMIN PO) Take  by mouth.     • amLODIPine (NORVASC) 2.5 MG tablet TAKE 1 TABLET BY MOUTH  DAILY 90 tablet 1   • levothyroxine (SYNTHROID, LEVOTHROID) 100 MCG tablet TAKE 1 TABLET BY MOUTH  DAILY 90 tablet 3   • metoprolol tartrate (LOPRESSOR) 50 MG tablet Take 1.5 tablets by mouth two times daily 30 tablet 0   • simvastatin (ZOCOR) 20 MG tablet TAKE 1 TABLET BY MOUTH  EVERY NIGHT AT BEDTIME 90 tablet 3  "    No facility-administered medications prior to visit.        Patient Active Problem List   Diagnosis   • Postoperative hypothyroidism   • Essential hypertension   • Hyperlipidemia   • Stage 3 chronic kidney disease (CMS/[facility])   • Osteoarthritis of right knee   • Osteoporosis, senile       Advanced Care Planning:  Patient has an advance directive - a copy has not been provided. Have asked the patient to send this to us to add to record    Review of Systems    Compared to one year ago, the patient feels her physical health is the same.  Compared to one year ago, the patient feels her mental health is the same.    Reviewed chart for potential of high risk medication in the elderly: yes  Reviewed chart for potential of harmful drug interactions in the elderly:yes    Objective         Vitals:    12/04/19 1325   BP: 124/82   BP Location: Left arm   Patient Position: Sitting   Cuff Size: Adult   Pulse: 76   SpO2: 97%   Weight: 65.8 kg (145 lb)   Height: 154.9 cm (61\")   PainSc:   2   PainLoc: Generalized       Body mass index is 27.4 kg/m².  Discussed the patient's BMI with her. The BMI is above average; BMI management plan is completed.    Physical Exam    Lab Results   Component Value Date    TRIG 144 12/04/2019    HDL 41 12/04/2019    LDL 50 12/04/2019    VLDL 28.8 12/04/2019        Assessment/Plan   Medicare Risks and Personalized Health Plan  CMS Preventative Services Quick Reference  Cardiovascular risk  Kidney disease    The above risks/problems have been discussed with the patient.  Pertinent information has been shared with the patient in the After Visit Summary.  Follow up plans and orders are seen below in the Assessment/Plan Section.    Diagnoses and all orders for this visit:    1. Essential hypertension (Primary)  -     metoprolol tartrate (LOPRESSOR) 50 MG tablet; Take 1.5 tablets by mouth two times daily  Dispense: 270 tablet; Refill: 3  -     amLODIPine (NORVASC) 2.5 MG tablet; Take 1 tablet by mouth " Daily.  Dispense: 90 tablet; Refill: 3  -     CBC Auto Differential; Future  -     Comprehensive Metabolic Panel; Future  -     CBC Auto Differential  -     Comprehensive Metabolic Panel    2. Acquired hypothyroidism  -     levothyroxine (SYNTHROID, LEVOTHROID) 100 MCG tablet; Take 1 tablet by mouth Daily.  Dispense: 90 tablet; Refill: 3  -     TSH; Future  -     TSH    3. Hyperlipidemia, unspecified hyperlipidemia type  -     simvastatin (ZOCOR) 20 MG tablet; Take 1 tablet by mouth every night at bedtime.  Dispense: 90 tablet; Refill: 3  -     Lipid Panel; Future  -     Lipid Panel    4. Primary osteoarthritis of both hands    5. Stage 3 chronic kidney disease (CMS/HCC)      Follow Up:  Return in about 6 months (around 6/4/2020).     An After Visit Summary and PPPS were given to the patient.

## 2019-12-10 NOTE — PATIENT INSTRUCTIONS
Medicare Wellness  Personal Prevention Plan of Service     Date of Office Visit:  2019  Encounter Provider:  JUNE Mohr  Place of Service:  [facility]  Patient Name: Sylvia Stover  :  1940    As part of the Medicare Wellness portion of your visit today, we are providing you with this personalized preventive plan of services (PPPS). This plan is based upon recommendations of the [place] Preventive Services Task Force (USPSTF) and the Advisory Committee on Immunization Practices (ACIP).    This lists the preventive care services that should be considered, and provides dates of when you are due. Items listed as completed are up-to-date and do not require any further intervention.    Health Maintenance   Topic Date Due   • DXA SCAN  2020   • MAMMOGRAM  2020   • MEDICARE ANNUAL WELLNESS  2020   • LIPID PANEL  2020   • TDAP/TD VACCINES (2 - Td) 2023   • COLONOSCOPY  2026   • PNEUMOCOCCAL VACCINE (65+ HIGH RISK)  Completed   • INFLUENZA VACCINE  Completed   • ZOSTER VACCINE  Completed       Orders Placed This Encounter   Procedures   • CBC Auto Differential     Standing Status:   Future     Number of Occurrences:   1     Standing Expiration Date:   12/3/2020   • Comprehensive Metabolic Panel     Standing Status:   Future     Number of Occurrences:   1     Standing Expiration Date:   12/3/2020   • Lipid Panel     Standing Status:   Future     Number of Occurrences:   1     Standing Expiration Date:   12/3/2020   • TSH     Standing Status:   Future     Number of Occurrences:   1     Standing Expiration Date:   12/3/2020       Return in about 6 months (around 2020).

## 2019-12-10 NOTE — PROGRESS NOTES
Subjective   Sylvia Stover is a 79 y.o. female who presents for f/u regarding HTN, hyperlipidemia and CKD.    She has discussed aspirin therapy with her nephrologist Dr. Spence who would like her to stay on 81 mg daily.  She is tolerating this well.  Her kidney function has remained stable.  She complains of pain in bilateral hands which is been attributed to arthritis.  She complains of intermittent tingling in her first and second fingers.  She has noticed decreased sensation with fine motor movements in these fingers.  She complains of left hip and leg pain which is worse after prolonged sitting.  Denies back pain.  She has a history of bilateral knee replacements.    Hypertension   This is a chronic problem. The current episode started more than 1 year ago. The problem is unchanged. The problem is controlled. Pertinent negatives include no chest pain, headaches, malaise/fatigue, palpitations, peripheral edema or shortness of breath. Current antihypertension treatment includes ACE inhibitors, beta blockers and calcium channel blockers. The current treatment provides significant improvement. There are no compliance problems.    Hypothyroidism   This is a chronic problem. The current episode started more than 1 year ago. The problem occurs daily. The problem has been unchanged. Associated symptoms include arthralgias, congestion and numbness (right hand, 1st and 2nd fingers). Pertinent negatives include no abdominal pain, chest pain, chills, coughing, fatigue, fever, headaches, joint swelling, nausea, sore throat, vomiting or weakness.        The following portions of the patient's history were reviewed and updated as appropriate: allergies, current medications, past social history and problem list.    Past Medical History:   Diagnosis Date   • Age related osteoporosis    • Anemia    • Chronic gastritis    • Chronic renal insufficiency    • Gastric ulcer    • GERD (gastroesophageal reflux disease)    • Greater  trochanteric bursitis    • H/O bone density study 11/2012    Osteopenia   • H/O mammogram 10/28/2015    stable   • Hyperlipidemia    • Hypertension    • Hypothyroidism    • Lumbago    • Osteoarthrosis    • PONV (postoperative nausea and vomiting)          Current Outpatient Medications:   •  amLODIPine (NORVASC) 2.5 MG tablet, Take 1 tablet by mouth Daily., Disp: 90 tablet, Rfl: 3  •  aspirin 81 MG tablet, Take 81 mg by mouth daily., Disp: , Rfl:   •  budesonide (PULMICORT) 90 MCG/ACT inhaler, Inhale 1 puff 2 (Two) Times a Day. Rinse and spit after use, Disp: 3 inhaler, Rfl: 1  •  Cholecalciferol (VITAMIN D) 2000 UNITS capsule, Take  by mouth., Disp: , Rfl:   •  Crisaborole 2 % ointment, Apply 1 Units topically 2 (Two) Times a Day., Disp: 60 g, Rfl: 1  •  denosumab (PROLIA) 60 MG/ML solution syringe, Inject  under the skin. Every 6 months, Disp: , Rfl:   •  diclofenac (VOLTAREN) 1 % gel gel, Apply 4 g topically 4 (Four) Times a Day As Needed (pain)., Disp: 300 g, Rfl: 1  •  famotidine (PEPCID) 20 MG tablet, Take 20 mg by mouth 2 (Two) Times a Day., Disp: , Rfl:   •  ferrous sulfate 325 (65 FE) MG tablet, Take 325 mg by mouth Daily With Breakfast., Disp: , Rfl:   •  levothyroxine (SYNTHROID, LEVOTHROID) 100 MCG tablet, Take 1 tablet by mouth Daily., Disp: 90 tablet, Rfl: 3  •  lisinopril (PRINIVIL,ZESTRIL) 20 MG tablet, TAKE 1 TABLET BY MOUTH TWO  TIMES DAILY, Disp: 180 tablet, Rfl: 3  •  meclizine (ANTIVERT) 12.5 MG tablet, Take 1 tablet by mouth 3 (Three) Times a Day As Needed for dizziness. 1-2 tablets every 8 hours as needed, Disp: 30 tablet, Rfl: 0  •  metoprolol tartrate (LOPRESSOR) 50 MG tablet, Take 1.5 tablets by mouth two times daily, Disp: 270 tablet, Rfl: 3  •  Multiple Vitamins-Minerals (MULTIVITAMIN PO), Take  by mouth., Disp: , Rfl:   •  simvastatin (ZOCOR) 20 MG tablet, Take 1 tablet by mouth every night at bedtime., Disp: 90 tablet, Rfl: 3    Allergies   Allergen Reactions   • Codeine        Review  "of Systems   Constitutional: Negative for chills, fatigue, fever, malaise/fatigue and unexpected weight change.   HENT: Positive for congestion and postnasal drip. Negative for ear pain, sinus pressure, sore throat and trouble swallowing.    Eyes: Negative for visual disturbance.   Respiratory: Negative for cough, chest tightness, shortness of breath and wheezing.    Cardiovascular: Negative for chest pain, palpitations and leg swelling.   Gastrointestinal: Negative for abdominal pain, blood in stool, nausea and vomiting.   Genitourinary: Negative for dysuria, frequency and urgency.   Musculoskeletal: Positive for arthralgias. Negative for joint swelling.   Skin: Negative for color change.   Neurological: Positive for numbness (right hand, 1st and 2nd fingers). Negative for syncope, weakness and headaches.   Hematological: Does not bruise/bleed easily.       Objective   Vitals:    12/04/19 1325   BP: 124/82   BP Location: Left arm   Patient Position: Sitting   Cuff Size: Adult   Pulse: 76   SpO2: 97%   Weight: 65.8 kg (145 lb)   Height: 154.9 cm (61\")     Body mass index is 27.4 kg/m².  Physical Exam   Constitutional: She appears well-developed and well-nourished. She is cooperative. She does not have a sickly appearance. She does not appear ill.   HENT:   Head: Normocephalic.   Right Ear: Hearing, tympanic membrane and external ear normal.   Left Ear: Hearing, tympanic membrane and external ear normal.   Nose: Nose normal. No mucosal edema, rhinorrhea, sinus tenderness or nasal deformity. Right sinus exhibits no maxillary sinus tenderness and no frontal sinus tenderness. Left sinus exhibits no maxillary sinus tenderness and no frontal sinus tenderness.   Mouth/Throat: Oropharynx is clear and moist and mucous membranes are normal. Normal dentition.   Eyes: Conjunctivae and lids are normal. Right eye exhibits no discharge and no exudate. Left eye exhibits no discharge and no exudate.   Neck: Trachea normal. Carotid " bruit is not present. No edema present. No thyroid mass present.   Cardiovascular: Regular rhythm, normal heart sounds and normal pulses.   No murmur heard.  Pulmonary/Chest: Breath sounds normal. No respiratory distress. She has no decreased breath sounds. She has no wheezes. She has no rhonchi. She has no rales.   Abdominal: Soft. There is no tenderness.   Musculoskeletal:        Right hand: She exhibits normal range of motion and no tenderness.        Left hand: She exhibits normal range of motion and no tenderness.   Gross arthritic changes bilateral hands   Lymphadenopathy:        Head (right side): No submental, no submandibular, no tonsillar, no preauricular, no posterior auricular and no occipital adenopathy present.        Head (left side): No submental, no submandibular, no tonsillar, no preauricular, no posterior auricular and no occipital adenopathy present.   Neurological: She is alert.   Skin: Skin is warm, dry and intact. No cyanosis. Nails show no clubbing.       Assessment/Plan   Sylvia was seen today for medicare wellness-subsequent, hypertension, hypothyroidism, hyperlipidemia and arthritis.    Diagnoses and all orders for this visit:    Essential hypertension  -     metoprolol tartrate (LOPRESSOR) 50 MG tablet; Take 1.5 tablets by mouth two times daily  -     amLODIPine (NORVASC) 2.5 MG tablet; Take 1 tablet by mouth Daily.  -     CBC Auto Differential; Future  -     Comprehensive Metabolic Panel; Future  -     CBC Auto Differential  -     Comprehensive Metabolic Panel    Acquired hypothyroidism  -     levothyroxine (SYNTHROID, LEVOTHROID) 100 MCG tablet; Take 1 tablet by mouth Daily.  -     TSH; Future  -     TSH    Hyperlipidemia, unspecified hyperlipidemia type  -     simvastatin (ZOCOR) 20 MG tablet; Take 1 tablet by mouth every night at bedtime.  -     Lipid Panel; Future  -     Lipid Panel    Primary osteoarthritis of both hands    Stage 3 chronic kidney disease (CMS/HCC)    She denies  myalgias with simvastatin, will recheck fasting lipids.  She is doing well with blood pressure control with current medications.  Discussed paresthesias of first and second fingers and possible EMG to further evaluate which she has declined today.  She does have symptoms of arthritis pain in her left hip which she will continue to monitor and consider further evaluation if symptoms persist.

## 2019-12-27 ENCOUNTER — HOSPITAL ENCOUNTER (OUTPATIENT)
Dept: PHYSICAL THERAPY | Facility: CLINIC | Age: 79
Setting detail: RECURRING SERIES
Discharge: HOME | End: 2020-01-17

## 2020-02-24 DIAGNOSIS — I10 ESSENTIAL HYPERTENSION: ICD-10-CM

## 2020-02-24 RX ORDER — METOPROLOL TARTRATE 50 MG/1
TABLET, FILM COATED ORAL
Qty: 270 TABLET | Refills: 3 | Status: SHIPPED | OUTPATIENT
Start: 2020-02-24 | End: 2020-11-18 | Stop reason: HOSPADM

## 2020-02-25 DIAGNOSIS — E03.9 ACQUIRED HYPOTHYROIDISM: ICD-10-CM

## 2020-02-25 RX ORDER — LEVOTHYROXINE SODIUM 0.1 MG/1
100 TABLET ORAL DAILY
Qty: 90 TABLET | Refills: 3 | Status: SHIPPED | OUTPATIENT
Start: 2020-02-25 | End: 2021-02-10

## 2020-02-25 RX ORDER — LISINOPRIL 20 MG/1
20 TABLET ORAL 2 TIMES DAILY
Qty: 180 TABLET | Refills: 3 | Status: SHIPPED | OUTPATIENT
Start: 2020-02-25 | End: 2020-10-12 | Stop reason: SDUPTHER

## 2020-03-09 DIAGNOSIS — E78.5 HYPERLIPIDEMIA, UNSPECIFIED HYPERLIPIDEMIA TYPE: ICD-10-CM

## 2020-03-09 RX ORDER — SIMVASTATIN 20 MG
20 TABLET ORAL
Qty: 90 TABLET | Refills: 3 | Status: SHIPPED | OUTPATIENT
Start: 2020-03-09 | End: 2021-01-11 | Stop reason: SDUPTHER

## 2020-06-08 DIAGNOSIS — Z78.0 POST-MENOPAUSAL: Primary | ICD-10-CM

## 2020-06-09 ENCOUNTER — CLINICAL SUPPORT (OUTPATIENT)
Dept: INTERNAL MEDICINE | Facility: CLINIC | Age: 80
End: 2020-06-09

## 2020-06-09 ENCOUNTER — OFFICE VISIT (OUTPATIENT)
Dept: INTERNAL MEDICINE | Facility: CLINIC | Age: 80
End: 2020-06-09

## 2020-06-09 VITALS
DIASTOLIC BLOOD PRESSURE: 78 MMHG | OXYGEN SATURATION: 98 % | SYSTOLIC BLOOD PRESSURE: 130 MMHG | TEMPERATURE: 98.2 F | HEIGHT: 61 IN | BODY MASS INDEX: 27.38 KG/M2 | WEIGHT: 145 LBS | HEART RATE: 53 BPM

## 2020-06-09 DIAGNOSIS — E78.5 HYPERLIPIDEMIA, UNSPECIFIED HYPERLIPIDEMIA TYPE: ICD-10-CM

## 2020-06-09 DIAGNOSIS — Z11.59 SCREENING FOR VIRAL DISEASE: ICD-10-CM
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
## 2020-06-09 DIAGNOSIS — I10 ESSENTIAL HYPERTENSION: Primary | ICD-10-CM

## 2020-06-09 DIAGNOSIS — M81.0 OSTEOPOROSIS, SENILE: ICD-10-CM

## 2020-06-09 DIAGNOSIS — R05.9 COUGH: ICD-10-CM

## 2020-06-09 DIAGNOSIS — Z78.0 POST-MENOPAUSAL: ICD-10-CM

## 2020-06-09 DIAGNOSIS — G56.03 BILATERAL CARPAL TUNNEL SYNDROME: ICD-10-CM

## 2020-06-09 DIAGNOSIS — N18.30 STAGE 3 CHRONIC KIDNEY DISEASE (HCC): ICD-10-CM

## 2020-06-09 DIAGNOSIS — E03.9 ACQUIRED HYPOTHYROIDISM: ICD-10-CM

## 2020-06-09 PROCEDURE — 99214 OFFICE O/P EST MOD 30 MIN: CPT | Performed by: NURSE PRACTITIONER

## 2020-06-09 PROCEDURE — 77080 DXA BONE DENSITY AXIAL: CPT | Performed by: NURSE PRACTITIONER

## 2020-06-09 RX ORDER — DOXYCYCLINE 100 MG/1
100 TABLET ORAL 2 TIMES DAILY
Qty: 10 TABLET | Refills: 0 | Status: SHIPPED | OUTPATIENT
Start: 2020-06-09 | End: 2020-06-14

## 2020-06-09 NOTE — PROGRESS NOTES
Subjective   Sylvia Stover is a 80 y.o. female who presents for follow-up regarding hypertension, hypothyroidism and CKD.  She is also here for her bone density with a history of osteopenia.    She has a history of osteoporosis and is currently managed on Prolia injections every 6 months which she is tolerating well.  Her bone density today is stable and does not show significant changes.  She did see her nephrologist last month and her phosphorus is mildly elevated at 4.5.  Her vitamin D was 61.  Her vitamin D was decreased from 2000 to 1000 units daily.  She has been well this spring and is using her Pulmicort inhaler a couple times a month.    Hypertension   This is a chronic problem. The current episode started more than 1 year ago. The problem is unchanged. The problem is controlled. Pertinent negatives include no chest pain, headaches, palpitations, peripheral edema or shortness of breath. Current antihypertension treatment includes ACE inhibitors and beta blockers. The current treatment provides significant improvement. There are no compliance problems.    Chronic Kidney Disease   Associated symptoms include arthralgias. Pertinent negatives include no abdominal pain, chest pain, chills, congestion, coughing, fatigue, fever, headaches, joint swelling, nausea, sore throat, vomiting or weakness.   Hypothyroidism   Associated symptoms include arthralgias. Pertinent negatives include no abdominal pain, chest pain, chills, congestion, coughing, fatigue, fever, headaches, joint swelling, nausea, sore throat, vomiting or weakness.        The following portions of the patient's history were reviewed and updated as appropriate: allergies, current medications, past social history and problem list.    Past Medical History:   Diagnosis Date   • Age related osteoporosis    • Anemia    • Chronic gastritis    • Chronic renal insufficiency    • Gastric ulcer    • GERD (gastroesophageal reflux disease)    • Greater  trochanteric bursitis    • H/O bone density study 11/2012    Osteopenia   • H/O mammogram 10/28/2015    stable   • Hyperlipidemia    • Hypertension    • Hypothyroidism    • Lumbago    • Osteoarthrosis    • PONV (postoperative nausea and vomiting)          Current Outpatient Medications:   •  amLODIPine (NORVASC) 2.5 MG tablet, Take 1 tablet by mouth Daily., Disp: 90 tablet, Rfl: 3  •  aspirin 81 MG tablet, Take 81 mg by mouth daily., Disp: , Rfl:   •  budesonide (PULMICORT) 90 MCG/ACT inhaler, Inhale 1 puff 2 (Two) Times a Day As Needed (shortness of breath). Rinse and spit after use, Disp: 3 inhaler, Rfl: 1  •  Cholecalciferol (VITAMIN D) 2000 UNITS capsule, Take 1,000 Units by mouth., Disp: , Rfl:   •  Crisaborole 2 % ointment, Apply 1 Units topically 2 (Two) Times a Day., Disp: 60 g, Rfl: 1  •  denosumab (PROLIA) 60 MG/ML solution syringe, Inject  under the skin. Every 6 months, Disp: , Rfl:   •  diclofenac (VOLTAREN) 1 % gel gel, Apply 4 g topically 4 (Four) Times a Day As Needed (pain)., Disp: 300 g, Rfl: 1  •  famotidine (PEPCID) 20 MG tablet, Take 20 mg by mouth 2 (Two) Times a Day., Disp: , Rfl:   •  ferrous sulfate 325 (65 FE) MG tablet, Take 325 mg by mouth Daily With Breakfast., Disp: , Rfl:   •  levothyroxine (SYNTHROID, LEVOTHROID) 100 MCG tablet, Take 1 tablet by mouth Daily., Disp: 90 tablet, Rfl: 3  •  lisinopril (PRINIVIL,ZESTRIL) 20 MG tablet, Take 1 tablet by mouth 2 (Two) Times a Day., Disp: 180 tablet, Rfl: 3  •  meclizine (ANTIVERT) 12.5 MG tablet, Take 1 tablet by mouth 3 (Three) Times a Day As Needed for dizziness. 1-2 tablets every 8 hours as needed, Disp: 30 tablet, Rfl: 0  •  metoprolol tartrate (LOPRESSOR) 50 MG tablet, Take 1.5 tablets by mouth two times daily, Disp: 270 tablet, Rfl: 3  •  Multiple Vitamins-Minerals (MULTIVITAMIN PO), Take  by mouth., Disp: , Rfl:   •  simvastatin (ZOCOR) 20 MG tablet, Take 1 tablet by mouth every night at bedtime., Disp: 90 tablet, Rfl: 3  •   "doxycycline (ADOXA) 100 MG tablet, Take 1 tablet by mouth 2 (Two) Times a Day for 5 days., Disp: 10 tablet, Rfl: 0    Allergies   Allergen Reactions   • Codeine        Review of Systems   Constitutional: Negative for chills, fatigue, fever and unexpected weight change.   HENT: Positive for postnasal drip. Negative for congestion, ear pain, sinus pressure, sore throat and trouble swallowing.    Eyes: Negative for visual disturbance.        Right eye itching, no visual change   Respiratory: Negative for cough, chest tightness, shortness of breath and wheezing.    Cardiovascular: Negative for chest pain, palpitations and leg swelling.   Gastrointestinal: Negative for abdominal pain, blood in stool, nausea and vomiting.   Genitourinary: Negative for dysuria, frequency and urgency.   Musculoskeletal: Positive for arthralgias and back pain. Negative for joint swelling.   Skin: Negative for color change.   Neurological: Negative for syncope, weakness and headaches.   Hematological: Does not bruise/bleed easily.       Objective   Vitals:    06/09/20 1317   BP: 130/78   BP Location: Left arm   Patient Position: Sitting   Cuff Size: Adult   Pulse: 53   Temp: 98.2 °F (36.8 °C)   TempSrc: Oral   SpO2: 98%   Weight: 65.8 kg (145 lb)   Height: 154.9 cm (61\")     Body mass index is 27.4 kg/m².  Physical Exam   Constitutional: She appears well-developed and well-nourished. She is cooperative. She does not have a sickly appearance. She does not appear ill.   HENT:   Head: Normocephalic.   Right Ear: Hearing, tympanic membrane and external ear normal.   Left Ear: Hearing, tympanic membrane and external ear normal.   Nose: Nose normal. No mucosal edema, rhinorrhea, sinus tenderness or nasal deformity. Right sinus exhibits no maxillary sinus tenderness and no frontal sinus tenderness. Left sinus exhibits no maxillary sinus tenderness and no frontal sinus tenderness.   Mouth/Throat: Oropharynx is clear and moist and mucous membranes are " normal. Normal dentition.   Eyes: Conjunctivae and lids are normal. Right eye exhibits no discharge and no exudate. Left eye exhibits no discharge and no exudate.   Neck: Trachea normal. Carotid bruit is not present. No edema present. No thyroid mass present.   Cardiovascular: Regular rhythm, normal heart sounds and normal pulses.   No murmur heard.  Pulmonary/Chest: Breath sounds normal. No respiratory distress. She has no decreased breath sounds. She has no wheezes. She has no rhonchi. She has no rales.   Lymphadenopathy:        Head (right side): No submental, no submandibular, no tonsillar, no preauricular, no posterior auricular and no occipital adenopathy present.        Head (left side): No submental, no submandibular, no tonsillar, no preauricular, no posterior auricular and no occipital adenopathy present.   Neurological: She is alert.   Skin: Skin is warm, dry and intact. No cyanosis. Nails show no clubbing.   There is a tick attached to the right thoracic area       Assessment/Plan   Sylvia was seen today for hypertension, chronic kidney disease, hypothyroidism and osteopenia.    Diagnoses and all orders for this visit:    Essential hypertension    Hyperlipidemia, unspecified hyperlipidemia type    Cough  Comments:  seasonal, takes Pulmicort with good relief of symptoms  Orders:  -     budesonide (PULMICORT) 90 MCG/ACT inhaler; Inhale 1 puff 2 (Two) Times a Day As Needed (shortness of breath). Rinse and spit after use    Stage 3 chronic kidney disease (CMS/HCC)  -     Basic Metabolic Panel  -     Phosphorus; Future  -     Phosphorus    Osteoporosis, senile    Acquired hypothyroidism    Tick bite, initial encounter  -     doxycycline (ADOXA) 100 MG tablet; Take 1 tablet by mouth 2 (Two) Times a Day for 5 days.    Screening for viral disease  -     Hepatitis C Antibody    Bilateral carpal tunnel syndrome    1.  Her blood pressure is stable on current medication which she will continue.  2.  She is tolerating  simvastatin without myalgias, LDL was 58 with December 2019 labs.  3. She is using Pulmicort as needed, anuradha 3-4 times per month.  4.  We will recheck her phosphorus due to mild elevation with last month's labs.  She has since decreased her vitamin D level.  We also discussed avoiding dark soft drinks.  5.  Her bone density today is more consistent with osteopenia and is stable from 2018.  Continue Prolia injections.  6.  She is managed on Synthroid, TSH done December 2019 was 0.430.  7.  Area cleaned with alcohol and using pickups a small tick was removed from the right bra line which she tolerated well. Band-Aid applied. She has been itching on the area for the past several days and felt like he was present for approximately 48 hours.  Will treat with doxycycline and continue to monitor.

## 2020-06-10 LAB
BUN SERPL-MCNC: 25 MG/DL (ref 8–23)
BUN/CREAT SERPL: 18.1 (ref 7–25)
CALCIUM SERPL-MCNC: 9.6 MG/DL (ref 8.6–10.5)
CHLORIDE SERPL-SCNC: 102 MMOL/L (ref 98–107)
CO2 SERPL-SCNC: 25.9 MMOL/L (ref 22–29)
CREAT SERPL-MCNC: 1.38 MG/DL (ref 0.57–1)
GLUCOSE SERPL-MCNC: 95 MG/DL (ref 65–99)
HCV AB S/CO SERPL IA: <0.1 S/CO RATIO (ref 0–0.9)
PHOSPHATE SERPL-MCNC: 3.7 MG/DL (ref 2.5–4.5)
POTASSIUM SERPL-SCNC: 4.4 MMOL/L (ref 3.5–5.2)
SODIUM SERPL-SCNC: 137 MMOL/L (ref 136–145)

## 2020-06-11 ENCOUNTER — PATIENT MESSAGE (OUTPATIENT)
Dept: INTERNAL MEDICINE | Facility: CLINIC | Age: 80
End: 2020-06-11

## 2020-06-15 NOTE — TELEPHONE ENCOUNTER
From: Sylvia Stover  To: Rhoda Luna APRN  Sent: 6/11/2020 9:45 PM EDT  Subject: Non-Urgent Medical Question    I've looked over the results of the blood draw that was done on June 9; but I could not find anything about the bone density test. I guess I must have been looking in the wrong category. I am particularly interested in what it showed because we had had a discussion sometime recently as to whether I should return to getting the Prolia injections. I have not had one since June 4, 2018. Since then, I've had some dental surgery which precluded getting Prolia injections. Should I start getting Prolia injections again or not? (I have no more dental surgeries scheduled.)    Thank you,  Sylvia Stover

## 2020-08-07 DIAGNOSIS — M25.572 ACUTE LEFT ANKLE PAIN: Primary | ICD-10-CM

## 2020-08-07 RX ORDER — HYDROCODONE BITARTRATE AND ACETAMINOPHEN 5; 325 MG/1; MG/1
1 TABLET ORAL EVERY 6 HOURS PRN
Qty: 10 TABLET | Refills: 0 | Status: SHIPPED | OUTPATIENT
Start: 2020-08-07 | End: 2020-08-10

## 2020-08-15 ENCOUNTER — APPOINTMENT (OUTPATIENT)
Dept: CT IMAGING | Facility: HOSPITAL | Age: 80
End: 2020-08-15

## 2020-08-15 ENCOUNTER — HOSPITAL ENCOUNTER (OUTPATIENT)
Facility: HOSPITAL | Age: 80
Setting detail: OBSERVATION
Discharge: NURSING, REHAB OR LONG-TERM CARE | End: 2020-08-18
Attending: EMERGENCY MEDICINE | Admitting: INTERNAL MEDICINE

## 2020-08-15 ENCOUNTER — APPOINTMENT (OUTPATIENT)
Dept: GENERAL RADIOLOGY | Facility: HOSPITAL | Age: 80
End: 2020-08-15

## 2020-08-15 DIAGNOSIS — S70.02XA CONTUSION OF HIP AND THIGH, LEFT, INITIAL ENCOUNTER: ICD-10-CM

## 2020-08-15 DIAGNOSIS — S70.12XA CONTUSION OF HIP AND THIGH, LEFT, INITIAL ENCOUNTER: ICD-10-CM

## 2020-08-15 DIAGNOSIS — S30.0XXA: ICD-10-CM

## 2020-08-15 DIAGNOSIS — I10 ESSENTIAL HYPERTENSION: ICD-10-CM
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
## 2020-08-15 LAB
ALBUMIN SERPL-MCNC: 4.4 G/DL (ref 3.5–5.2)
ALBUMIN/GLOB SERPL: 1.6 G/DL
ALP SERPL-CCNC: 49 U/L (ref 39–117)
ALT SERPL W P-5'-P-CCNC: 21 U/L (ref 1–33)
ANION GAP SERPL CALCULATED.3IONS-SCNC: 13.3 MMOL/L (ref 5–15)
AST SERPL-CCNC: 18 U/L (ref 1–32)
BASOPHILS # BLD AUTO: 0.08 10*3/MM3 (ref 0–0.2)
BASOPHILS NFR BLD AUTO: 0.6 % (ref 0–1.5)
BILIRUB SERPL-MCNC: 0.5 MG/DL (ref 0–1.2)
BUN SERPL-MCNC: 20 MG/DL (ref 8–23)
BUN/CREAT SERPL: 18 (ref 7–25)
CALCIUM SPEC-SCNC: 10 MG/DL (ref 8.6–10.5)
CHLORIDE SERPL-SCNC: 97 MMOL/L (ref 98–107)
CO2 SERPL-SCNC: 23.7 MMOL/L (ref 22–29)
CREAT SERPL-MCNC: 1.11 MG/DL (ref 0.57–1)
DEPRECATED RDW RBC AUTO: 48 FL (ref 37–54)
EOSINOPHIL # BLD AUTO: 0.04 10*3/MM3 (ref 0–0.4)
EOSINOPHIL NFR BLD AUTO: 0.3 % (ref 0.3–6.2)
ERYTHROCYTE [DISTWIDTH] IN BLOOD BY AUTOMATED COUNT: 13.4 % (ref 12.3–15.4)
GFR SERPL CREATININE-BSD FRML MDRD: 47 ML/MIN/1.73
GLOBULIN UR ELPH-MCNC: 2.7 GM/DL
GLUCOSE SERPL-MCNC: 113 MG/DL (ref 65–99)
HCT VFR BLD AUTO: 38 % (ref 34–46.6)
HGB BLD-MCNC: 12.3 G/DL (ref 12–15.9)
IMM GRANULOCYTES # BLD AUTO: 0.05 10*3/MM3 (ref 0–0.05)
IMM GRANULOCYTES NFR BLD AUTO: 0.4 % (ref 0–0.5)
INR PPP: 1.04 (ref 0.9–1.1)
LYMPHOCYTES # BLD AUTO: 0.58 10*3/MM3 (ref 0.7–3.1)
LYMPHOCYTES NFR BLD AUTO: 4.4 % (ref 19.6–45.3)
MCH RBC QN AUTO: 30.9 PG (ref 26.6–33)
MCHC RBC AUTO-ENTMCNC: 32.4 G/DL (ref 31.5–35.7)
MCV RBC AUTO: 95.5 FL (ref 79–97)
MONOCYTES # BLD AUTO: 0.91 10*3/MM3 (ref 0.1–0.9)
MONOCYTES NFR BLD AUTO: 6.9 % (ref 5–12)
NEUTROPHILS NFR BLD AUTO: 11.61 10*3/MM3 (ref 1.7–7)
NEUTROPHILS NFR BLD AUTO: 87.4 % (ref 42.7–76)
NRBC BLD AUTO-RTO: 0 /100 WBC (ref 0–0.2)
PLATELET # BLD AUTO: 257 10*3/MM3 (ref 140–450)
PMV BLD AUTO: 9.2 FL (ref 6–12)
POTASSIUM SERPL-SCNC: 4.6 MMOL/L (ref 3.5–5.2)
PROT SERPL-MCNC: 7.1 G/DL (ref 6–8.5)
PROTHROMBIN TIME: 13.5 SECONDS (ref 11.7–14.2)
RBC # BLD AUTO: 3.98 10*6/MM3 (ref 3.77–5.28)
SODIUM SERPL-SCNC: 134 MMOL/L (ref 136–145)
WBC # BLD AUTO: 13.27 10*3/MM3 (ref 3.4–10.8)

## 2020-08-15 PROCEDURE — 96375 TX/PRO/DX INJ NEW DRUG ADDON: CPT

## 2020-08-15 PROCEDURE — 96374 THER/PROPH/DIAG INJ IV PUSH: CPT

## 2020-08-15 PROCEDURE — 25010000002 ONDANSETRON PER 1 MG: Performed by: EMERGENCY MEDICINE

## 2020-08-15 PROCEDURE — 73502 X-RAY EXAM HIP UNI 2-3 VIEWS: CPT

## 2020-08-15 PROCEDURE — 72192 CT PELVIS W/O DYE: CPT

## 2020-08-15 PROCEDURE — 85610 PROTHROMBIN TIME: CPT | Performed by: EMERGENCY MEDICINE

## 2020-08-15 PROCEDURE — 85025 COMPLETE CBC W/AUTO DIFF WBC: CPT | Performed by: EMERGENCY MEDICINE

## 2020-08-15 PROCEDURE — 25010000002 MORPHINE PER 10 MG: Performed by: EMERGENCY MEDICINE

## 2020-08-15 PROCEDURE — 80053 COMPREHEN METABOLIC PANEL: CPT | Performed by: EMERGENCY MEDICINE

## 2020-08-15 PROCEDURE — 96361 HYDRATE IV INFUSION ADD-ON: CPT

## 2020-08-15 RX ORDER — ONDANSETRON 2 MG/ML
4 INJECTION INTRAMUSCULAR; INTRAVENOUS ONCE
Status: COMPLETED | OUTPATIENT
Start: 2020-08-15 | End: 2020-08-15

## 2020-08-15 RX ORDER — MORPHINE SULFATE 2 MG/ML
2 INJECTION, SOLUTION INTRAMUSCULAR; INTRAVENOUS ONCE
Status: COMPLETED | OUTPATIENT
Start: 2020-08-15 | End: 2020-08-15

## 2020-08-15 RX ORDER — LIDOCAINE 50 MG/G
1 PATCH TOPICAL ONCE
Status: COMPLETED | OUTPATIENT
Start: 2020-08-15 | End: 2020-08-16

## 2020-08-15 RX ORDER — SODIUM CHLORIDE 9 MG/ML
125 INJECTION, SOLUTION INTRAVENOUS CONTINUOUS
Status: DISCONTINUED | OUTPATIENT
Start: 2020-08-15 | End: 2020-08-17

## 2020-08-15 RX ADMIN — ONDANSETRON 4 MG: 2 INJECTION INTRAMUSCULAR; INTRAVENOUS at 20:40

## 2020-08-15 RX ADMIN — SODIUM CHLORIDE 125 ML/HR: 9 INJECTION, SOLUTION INTRAVENOUS at 20:40

## 2020-08-15 RX ADMIN — MORPHINE SULFATE 2 MG: 2 INJECTION, SOLUTION INTRAMUSCULAR; INTRAVENOUS at 20:40

## 2020-08-15 NOTE — ED NOTES
Pt to ED via w/c from home after a few falls.  Pt reports L groin/hip pain after both falls.  Pt reports severe pain with walking. Pt denies hitting her head, abrasions to L elbow.      Pt wearing mask, staff wearing appropriate PPE during triage.     Miguelina Bush, RN  08/15/20 [id number]

## 2020-08-15 NOTE — ED NOTES
Pt states she was bending over to put on her shoes when she believes she pulled a muscle in her groin area. Pt then went outside with her dog when her left leg gave out and she fell on her left side. Pt has abrasions to her left arm. Pt c/o left hip and groin pain. Pt denies hitting head and is not on blood thinners.     Patient in mask. This RN in appropriate PPE - including mask, goggles, and gloves during all of patient care.        Pma Escamilla, RN  08/15/20 8734

## 2020-08-16 PROBLEM — K21.9 GERD (GASTROESOPHAGEAL REFLUX DISEASE): Status: ACTIVE | Noted: 2020-08-16
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
## 2020-08-16 PROBLEM — E03.9 HYPOTHYROIDISM: Status: ACTIVE | Noted: 2020-08-16

## 2020-08-16 PROBLEM — M81.0 OSTEOPOROSIS: Status: ACTIVE | Noted: 2020-08-16

## 2020-08-16 LAB
ANION GAP SERPL CALCULATED.3IONS-SCNC: 12.7 MMOL/L (ref 5–15)
BUN SERPL-MCNC: 19 MG/DL (ref 8–23)
BUN/CREAT SERPL: 17.3 (ref 7–25)
CALCIUM SPEC-SCNC: 9.4 MG/DL (ref 8.6–10.5)
CHLORIDE SERPL-SCNC: 98 MMOL/L (ref 98–107)
CO2 SERPL-SCNC: 23.3 MMOL/L (ref 22–29)
CREAT SERPL-MCNC: 1.1 MG/DL (ref 0.57–1)
DEPRECATED RDW RBC AUTO: 46.9 FL (ref 37–54)
ERYTHROCYTE [DISTWIDTH] IN BLOOD BY AUTOMATED COUNT: 13.4 % (ref 12.3–15.4)
GFR SERPL CREATININE-BSD FRML MDRD: 48 ML/MIN/1.73
GLUCOSE SERPL-MCNC: 111 MG/DL (ref 65–99)
HCT VFR BLD AUTO: 34 % (ref 34–46.6)
HGB BLD-MCNC: 11.4 G/DL (ref 12–15.9)
MCH RBC QN AUTO: 31.7 PG (ref 26.6–33)
MCHC RBC AUTO-ENTMCNC: 33.5 G/DL (ref 31.5–35.7)
MCV RBC AUTO: 94.4 FL (ref 79–97)
PLATELET # BLD AUTO: 197 10*3/MM3 (ref 140–450)
PMV BLD AUTO: 8.8 FL (ref 6–12)
POTASSIUM SERPL-SCNC: 4.2 MMOL/L (ref 3.5–5.2)
RBC # BLD AUTO: 3.6 10*6/MM3 (ref 3.77–5.28)
SODIUM SERPL-SCNC: 134 MMOL/L (ref 136–145)
WBC # BLD AUTO: 7.77 10*3/MM3 (ref 3.4–10.8)

## 2020-08-16 PROCEDURE — 96376 TX/PRO/DX INJ SAME DRUG ADON: CPT

## 2020-08-16 PROCEDURE — 25010000002 ONDANSETRON PER 1 MG: Performed by: NURSE PRACTITIONER

## 2020-08-16 PROCEDURE — 25010000002 TDAP 5-2.5-18.5 LF-MCG/0.5 SUSPENSION: Performed by: EMERGENCY MEDICINE

## 2020-08-16 PROCEDURE — 85027 COMPLETE CBC AUTOMATED: CPT | Performed by: NURSE PRACTITIONER

## 2020-08-16 PROCEDURE — 94799 UNLISTED PULMONARY SVC/PX: CPT

## 2020-08-16 PROCEDURE — G0378 HOSPITAL OBSERVATION PER HR: HCPCS

## 2020-08-16 PROCEDURE — 96361 HYDRATE IV INFUSION ADD-ON: CPT

## 2020-08-16 PROCEDURE — 97162 PT EVAL MOD COMPLEX 30 MIN: CPT

## 2020-08-16 PROCEDURE — U0004 COV-19 TEST NON-CDC HGH THRU: HCPCS | Performed by: PHYSICIAN ASSISTANT

## 2020-08-16 PROCEDURE — 25010000002 MORPHINE PER 10 MG: Performed by: HOSPITALIST

## 2020-08-16 PROCEDURE — 80048 BASIC METABOLIC PNL TOTAL CA: CPT | Performed by: NURSE PRACTITIONER

## 2020-08-16 PROCEDURE — 94640 AIRWAY INHALATION TREATMENT: CPT

## 2020-08-16 PROCEDURE — 97110 THERAPEUTIC EXERCISES: CPT

## 2020-08-16 PROCEDURE — 90471 IMMUNIZATION ADMIN: CPT | Performed by: EMERGENCY MEDICINE

## 2020-08-16 PROCEDURE — 99285 EMERGENCY DEPT VISIT HI MDM: CPT

## 2020-08-16 PROCEDURE — 90715 TDAP VACCINE 7 YRS/> IM: CPT | Performed by: EMERGENCY MEDICINE

## 2020-08-16 RX ORDER — ACETAMINOPHEN 160 MG/5ML
650 SOLUTION ORAL EVERY 4 HOURS PRN
Status: DISCONTINUED | OUTPATIENT
Start: 2020-08-16 | End: 2020-08-18 | Stop reason: HOSPADM

## 2020-08-16 RX ORDER — HYDROCODONE BITARTRATE AND ACETAMINOPHEN 5; 325 MG/1; MG/1
1 TABLET ORAL EVERY 6 HOURS PRN
Status: DISCONTINUED | OUTPATIENT
Start: 2020-08-16 | End: 2020-08-16 | Stop reason: SDUPTHER

## 2020-08-16 RX ORDER — SODIUM CHLORIDE 0.9 % (FLUSH) 0.9 %
10 SYRINGE (ML) INJECTION AS NEEDED
Status: DISCONTINUED | OUTPATIENT
Start: 2020-08-16 | End: 2020-08-18 | Stop reason: HOSPADM

## 2020-08-16 RX ORDER — ONDANSETRON 4 MG/1
4 TABLET, FILM COATED ORAL EVERY 6 HOURS PRN
Status: DISCONTINUED | OUTPATIENT
Start: 2020-08-16 | End: 2020-08-18 | Stop reason: HOSPADM

## 2020-08-16 RX ORDER — LIDOCAINE 50 MG/G
1 PATCH TOPICAL EVERY 24 HOURS
Qty: 6 EACH | Refills: 0 | Status: SHIPPED | OUTPATIENT
Start: 2020-08-16 | End: 2021-02-22

## 2020-08-16 RX ORDER — FAMOTIDINE 20 MG/1
20 TABLET, FILM COATED ORAL
Status: DISCONTINUED | OUTPATIENT
Start: 2020-08-16 | End: 2020-08-17

## 2020-08-16 RX ORDER — ACETAMINOPHEN 650 MG/1
650 SUPPOSITORY RECTAL EVERY 4 HOURS PRN
Status: DISCONTINUED | OUTPATIENT
Start: 2020-08-16 | End: 2020-08-18 | Stop reason: HOSPADM

## 2020-08-16 RX ORDER — MORPHINE SULFATE 2 MG/ML
2 INJECTION, SOLUTION INTRAMUSCULAR; INTRAVENOUS
Status: DISCONTINUED | OUTPATIENT
Start: 2020-08-16 | End: 2020-08-18 | Stop reason: HOSPADM

## 2020-08-16 RX ORDER — SODIUM CHLORIDE 9 MG/ML
75 INJECTION, SOLUTION INTRAVENOUS CONTINUOUS
Status: DISCONTINUED | OUTPATIENT
Start: 2020-08-16 | End: 2020-08-17

## 2020-08-16 RX ORDER — ACETAMINOPHEN 325 MG/1
650 TABLET ORAL EVERY 4 HOURS PRN
Status: DISCONTINUED | OUTPATIENT
Start: 2020-08-16 | End: 2020-08-18 | Stop reason: HOSPADM

## 2020-08-16 RX ORDER — BUDESONIDE 0.5 MG/2ML
0.5 INHALANT ORAL
Status: DISCONTINUED | OUTPATIENT
Start: 2020-08-16 | End: 2020-08-18 | Stop reason: HOSPADM

## 2020-08-16 RX ORDER — MECLIZINE HCL 12.5 MG/1
12.5 TABLET ORAL 3 TIMES DAILY PRN
Status: DISCONTINUED | OUTPATIENT
Start: 2020-08-16 | End: 2020-08-18 | Stop reason: HOSPADM

## 2020-08-16 RX ORDER — ONDANSETRON 2 MG/ML
4 INJECTION INTRAMUSCULAR; INTRAVENOUS EVERY 6 HOURS PRN
Status: DISCONTINUED | OUTPATIENT
Start: 2020-08-16 | End: 2020-08-18 | Stop reason: HOSPADM

## 2020-08-16 RX ORDER — AMLODIPINE BESYLATE 2.5 MG/1
2.5 TABLET ORAL DAILY
Status: DISCONTINUED | OUTPATIENT
Start: 2020-08-16 | End: 2020-08-18 | Stop reason: HOSPADM

## 2020-08-16 RX ORDER — ATORVASTATIN CALCIUM 20 MG/1
10 TABLET, FILM COATED ORAL DAILY
Status: DISCONTINUED | OUTPATIENT
Start: 2020-08-16 | End: 2020-08-18 | Stop reason: HOSPADM

## 2020-08-16 RX ORDER — LISINOPRIL 20 MG/1
20 TABLET ORAL 2 TIMES DAILY
Status: DISCONTINUED | OUTPATIENT
Start: 2020-08-16 | End: 2020-08-18 | Stop reason: HOSPADM

## 2020-08-16 RX ORDER — BISACODYL 5 MG/1
5 TABLET, DELAYED RELEASE ORAL DAILY PRN
Status: DISCONTINUED | OUTPATIENT
Start: 2020-08-16 | End: 2020-08-18 | Stop reason: HOSPADM

## 2020-08-16 RX ORDER — ONDANSETRON 4 MG/1
4 TABLET, ORALLY DISINTEGRATING ORAL EVERY 8 HOURS PRN
Qty: 9 TABLET | Refills: 0 | Status: SHIPPED | OUTPATIENT
Start: 2020-08-16 | End: 2021-04-12

## 2020-08-16 RX ORDER — LEVOTHYROXINE SODIUM 0.1 MG/1
100 TABLET ORAL
Status: DISCONTINUED | OUTPATIENT
Start: 2020-08-16 | End: 2020-08-18 | Stop reason: HOSPADM

## 2020-08-16 RX ORDER — HYDROCODONE BITARTRATE AND ACETAMINOPHEN 5; 325 MG/1; MG/1
1 TABLET ORAL EVERY 6 HOURS PRN
Status: DISCONTINUED | OUTPATIENT
Start: 2020-08-16 | End: 2020-08-18 | Stop reason: HOSPADM

## 2020-08-16 RX ORDER — HYDROCODONE BITARTRATE AND ACETAMINOPHEN 5; 325 MG/1; MG/1
1 TABLET ORAL EVERY 8 HOURS PRN
Qty: 9 TABLET | Refills: 0 | Status: SHIPPED | OUTPATIENT
Start: 2020-08-16 | End: 2020-08-18 | Stop reason: SDUPTHER

## 2020-08-16 RX ORDER — SODIUM CHLORIDE 0.9 % (FLUSH) 0.9 %
10 SYRINGE (ML) INJECTION EVERY 12 HOURS SCHEDULED
Status: DISCONTINUED | OUTPATIENT
Start: 2020-08-16 | End: 2020-08-18 | Stop reason: HOSPADM

## 2020-08-16 RX ADMIN — LEVOTHYROXINE SODIUM 100 MCG: 100 TABLET ORAL at 09:14

## 2020-08-16 RX ADMIN — LIDOCAINE 1 PATCH: 50 PATCH TOPICAL at 00:02

## 2020-08-16 RX ADMIN — ONDANSETRON 4 MG: 2 INJECTION INTRAMUSCULAR; INTRAVENOUS at 18:25

## 2020-08-16 RX ADMIN — ONDANSETRON 4 MG: 2 INJECTION INTRAMUSCULAR; INTRAVENOUS at 06:35

## 2020-08-16 RX ADMIN — FAMOTIDINE 20 MG: 20 TABLET, FILM COATED ORAL at 16:31

## 2020-08-16 RX ADMIN — SODIUM CHLORIDE 75 ML/HR: 9 INJECTION, SOLUTION INTRAVENOUS at 05:06

## 2020-08-16 RX ADMIN — TETANUS TOXOID, REDUCED DIPHTHERIA TOXOID AND ACELLULAR PERTUSSIS VACCINE, ADSORBED 0.5 ML: 5; 2.5; 8; 8; 2.5 SUSPENSION INTRAMUSCULAR at 00:37

## 2020-08-16 RX ADMIN — LISINOPRIL 20 MG: 20 TABLET ORAL at 09:14

## 2020-08-16 RX ADMIN — METOPROLOL TARTRATE 75 MG: 25 TABLET, FILM COATED ORAL at 09:15

## 2020-08-16 RX ADMIN — BUDESONIDE 0.5 MG: 0.5 INHALANT RESPIRATORY (INHALATION) at 20:52

## 2020-08-16 RX ADMIN — HYDROCODONE BITARTRATE AND ACETAMINOPHEN 1 TABLET: 5; 325 TABLET ORAL at 18:25

## 2020-08-16 RX ADMIN — AMLODIPINE BESYLATE 2.5 MG: 2.5 TABLET ORAL at 09:15

## 2020-08-16 RX ADMIN — SODIUM CHLORIDE 75 ML/HR: 9 INJECTION, SOLUTION INTRAVENOUS at 16:31

## 2020-08-16 RX ADMIN — HYDROCODONE BITARTRATE AND ACETAMINOPHEN 1 TABLET: 5; 325 TABLET ORAL at 12:24

## 2020-08-16 RX ADMIN — MORPHINE SULFATE 2 MG: 2 INJECTION, SOLUTION INTRAMUSCULAR; INTRAVENOUS at 06:35

## 2020-08-16 RX ADMIN — FAMOTIDINE 20 MG: 20 TABLET, FILM COATED ORAL at 07:03

## 2020-08-16 RX ADMIN — SODIUM CHLORIDE, PRESERVATIVE FREE 10 ML: 5 INJECTION INTRAVENOUS at 09:15

## 2020-08-16 RX ADMIN — ATORVASTATIN CALCIUM 10 MG: 20 TABLET, FILM COATED ORAL at 09:14

## 2020-08-16 RX ADMIN — ONDANSETRON 4 MG: 2 INJECTION INTRAMUSCULAR; INTRAVENOUS at 12:24

## 2020-08-16 NOTE — ED NOTES
"Pt ambulated c wheeled walker in room and back to bed without incident. Pt able to moved herself to bedside without problem, lift both legs for this RN to place non-slip socks on pts feet, and able to stand without problem. When starting to ambulate, pt started to drag foot - unwilling and unable to step with foot. Pt able to bend knee and step but unwilling to step with foot. Pt redirected on proper use of walker with pts family crying in background and running out of room stating \"I cannot do this, she cannot do this, I cannot and she cannot get into the house.\" Pt returned back to bed without incident and pt unwilling to move self back into bed; pt assisted back in bed. PA notified.      Fabrizio Díaz RN  08/16/20 8437    "

## 2020-08-16 NOTE — PLAN OF CARE
Problem: Patient Care Overview  Goal: Plan of Care Review  Outcome: Ongoing (interventions implemented as appropriate)  Flowsheets (Taken 8/16/2020 0522)  Progress: no change  Plan of Care Reviewed With: patient  Outcome Summary: ER ADMIT. FALL WITH LEFT HIP PAIN. VSS. ZERO COMPLAIN AT REST. PUREWICK IN PLACE. NPO DIET. BRUISE TO LEFT ELBOW AND LEFT UPPER SHOULDER . EDUCATION PROVIDED ON BP MONITORING. PATIENT VERBALIZED UNDERSTANDING.

## 2020-08-16 NOTE — CONSULTS
Orthopaedic Surgery  Consult Note  Dr. JOSE LUIS Suh II  [phone]    HPI:  Patient is a 80 y.o. Not  or  female who presents with left hip pain after a couple recent falls.  The patient presented to the emergency room where x-rays were taken that were negative for fracture.  A CT scan demonstrated a left psoas hematoma.  The patient has no pain at rest but sharp pain with any attempted ambulation.  It does not not really hurt her just to stand.    MEDICAL HISTORY  Past Medical History:   Diagnosis Date   • Age related osteoporosis    • Anemia    • Chronic gastritis    • Chronic renal insufficiency    • Gastric ulcer    • GERD (gastroesophageal reflux disease)    • Greater trochanteric bursitis    • H/O bone density study 11/2012    Osteopenia   • H/O mammogram 10/28/2015    stable   • Hyperlipidemia    • Hypertension    • Hypothyroidism    • Lumbago    • Osteoarthrosis    • PONV (postoperative nausea and vomiting)    ·   Past Surgical History:   Procedure Laterality Date   • COLONOSCOPY  07/06/2006   • COLONOSCOPY N/A 5/12/2016    Procedure: COLONOSCOPY into cecum/terminal ileum;  Surgeon: Huy Sam MD;  Location: [facility] ENDOSCOPY;  Service:    • ENDOSCOPY N/A 5/12/2016    Procedure: ESOPHAGOGASTRODUODENOSCOPY with biopsy;  Surgeon: Huy Sam MD;  Location: [facility] ENDOSCOPY;  Service:    • ENDOSCOPY N/A 8/12/2016    Procedure: ESOPHAGOGASTRODUODENOSCOPY WITH BIOPSIES (COLD);  Surgeon: Huy Sam MD;  Location: [facility] ENDOSCOPY;  Service:    • FOOT SURGERY     • KNEE SURGERY     • PAP SMEAR  09/17/2013   • TONSILLECTOMY     • TOTAL THYROIDECTOMY     • UPPER GASTROINTESTINAL ENDOSCOPY  04/21/2014    Janene Sánchez Grade IV reflux esophagitis, gastric ulcer w/clean base, chronic gastritis, no h-pylori   ·   Prior to Admission medications    Medication Sig Start Date End Date Taking? Authorizing Provider   amLODIPine (NORVASC) 2.5 MG tablet Take 1 tablet by mouth Daily. 12/4/19  Yes  Rhoda Luna APRN   aspirin 81 MG tablet Take 81 mg by mouth daily.   Yes Eleni Jimenez MD   budesonide (PULMICORT) 90 MCG/ACT inhaler Inhale 1 puff 2 (Two) Times a Day As Needed (shortness of breath). Rinse and spit after use 6/9/20  Yes Rhoda Luna APRN   Cholecalciferol (VITAMIN D) 2000 UNITS capsule Take 1,000 Units by mouth.   Yes Eleni Jimenez MD   Crisaborole 2 % ointment Apply 1 Units topically 2 (Two) Times a Day. 9/17/18  Yes Rhoda Luna APRN   denosumab (PROLIA) 60 MG/ML solution syringe Inject  under the skin. Every 6 months 1/2/15  Yes Jan Kim MD   diclofenac (VOLTAREN) 1 % gel gel Apply 4 g topically 4 (Four) Times a Day As Needed (pain). 5/24/17  Yes Rhoda Luna APRN   famotidine (PEPCID) 20 MG tablet Take 20 mg by mouth 2 (Two) Times a Day.   Yes ProviderEleni MD   ferrous sulfate 325 (65 FE) MG tablet Take 325 mg by mouth Daily With Breakfast.   Yes ProviderEleni MD   levothyroxine (SYNTHROID, LEVOTHROID) 100 MCG tablet Take 1 tablet by mouth Daily. 2/25/20  Yes Rhoda Luna APRN   lisinopril (PRINIVIL,ZESTRIL) 20 MG tablet Take 1 tablet by mouth 2 (Two) Times a Day. 2/25/20  Yes Rhoda Luna APRN   meclizine (ANTIVERT) 12.5 MG tablet Take 1 tablet by mouth 3 (Three) Times a Day As Needed for dizziness. 1-2 tablets every 8 hours as needed 5/30/18  Yes Rhoda Luna APRN   metoprolol tartrate (LOPRESSOR) 50 MG tablet Take 1.5 tablets by mouth two times daily 2/24/20  Yes Rhoda Luna APRN   Multiple Vitamins-Minerals (MULTIVITAMIN PO) Take  by mouth.   Yes Eleni Jimenez MD   simvastatin (ZOCOR) 20 MG tablet Take 1 tablet by mouth every night at bedtime. 3/9/20  Yes Arnold, Rhoda K, APRN   HYDROcodone-acetaminophen (NORCO) 5-325 MG per tablet Take 1 tablet by mouth Every 8 (Eight) Hours As Needed for Moderate Pain . 8/16/20   Kristen Carvajal MD   lidocaine (LIDODERM) 5 % Place 1 patch on the skin as directed  "by provider Daily. Remove & Discard patch within 12 hours or as directed by MD 20   Kristen Carvajal MD   ondansetron ODT (Zofran ODT) 4 MG disintegrating tablet Place 1 tablet on the tongue Every 8 (Eight) Hours As Needed for Vomiting. 20   Kristen Carvajal MD   ·   Allergies   Allergen Reactions   • Codeine Nausea And Vomiting   ·   Most Recent Immunizations   Administered Date(s) Administered   • Flu Mist 2015   • Fluad Quad 10/07/2019   • Fluzone High Dose =>65 Years ([employer] ONLY) 10/07/2019   • Hepatitis A 2018   • Pneumococcal Conjugate 13-Valent (PCV13) 2016   • Pneumococcal Polysaccharide (PPSV23) 2008   • Pneumococcal, Unspecified 2008   • Shingrix 10/01/2018   • Td 2001   • Tdap 2020   • Zostavax 2018   ·   Social History     Tobacco Use   • Smoking status: Former Smoker     Packs/day: 0.25     Types: Cigarettes     Last attempt to quit: 2008     Years since quittin.6   • Smokeless tobacco: Never Used   Substance Use Topics   • Alcohol use: No   ·    Social History     Substance and Sexual Activity   Drug Use No   ·     VITALS: /79 (BP Location: Left arm, Patient Position: Sitting)   Pulse 57   Temp 98.7 °F (37.1 °C) (Temporal)   Resp 18   Ht 154.9 cm (61\")   Wt 65.8 kg (145 lb)   SpO2 100%   BMI 27.40 kg/m²  Body mass index is 27.4 kg/m².    PHYSICAL EXAM:   · CONSTITUTIONAL: No acute distress  · LUNGS: Equal chest rise, no shortness of air  · CARDIOVASCULAR: palpable peripheral pulses  · SKIN: no skin lesions in the area examined  · LYMPH: no lymphadenopathy in the area examined  · EXTREMITY: Left Lower Extremity  · Tenderness to Palpation: Mild tenderness to the anterior groin  · Gross Deformity: No Gross Deformity  · Pulses:  Brisk Capillary Refill  · Sensation: Intact to Saphenous, Sural, Deep Peroneal, Superficial Peroneal, and Tibial Nerves and grossly throughout extremity  · Motor: 5/5 EHL/FHL/TA/GS motor complexes    RADIOLOGY " REVIEW:   Ct Pelvis Without Contrast    Result Date: 8/16/2020  Mild diffuse enlargement of the left psoas muscle extending into the left inguinal region most likely due to hemorrhage. Otherwise unremarkable CT scan of the pelvis. There is no evidence of hip fracture.  This report was finalized on 8/16/2020 12:41 AM by Dr. Jerome Rivero M.D.        LABS:   Results for the past 24 hours:   Recent Results (from the past 24 hour(s))   Comprehensive Metabolic Panel    Collection Time: 08/15/20 10:24 PM   Result Value Ref Range    Glucose 113 (H) 65 - 99 mg/dL    BUN 20 8 - 23 mg/dL    Creatinine 1.11 (H) 0.57 - 1.00 mg/dL    Sodium 134 (L) 136 - 145 mmol/L    Potassium 4.6 3.5 - 5.2 mmol/L    Chloride 97 (L) 98 - 107 mmol/L    CO2 23.7 22.0 - 29.0 mmol/L    Calcium 10.0 8.6 - 10.5 mg/dL    Total Protein 7.1 6.0 - 8.5 g/dL    Albumin 4.40 3.50 - 5.20 g/dL    ALT (SGPT) 21 1 - 33 U/L    AST (SGOT) 18 1 - 32 U/L    Alkaline Phosphatase 49 39 - 117 U/L    Total Bilirubin 0.5 0.0 - 1.2 mg/dL    eGFR Non African Amer 47 (L) >60 mL/min/1.73    Globulin 2.7 gm/dL    A/G Ratio 1.6 g/dL    BUN/Creatinine Ratio 18.0 7.0 - 25.0    Anion Gap 13.3 5.0 - 15.0 mmol/L   Protime-INR    Collection Time: 08/15/20 10:24 PM   Result Value Ref Range    Protime 13.5 11.7 - 14.2 Seconds    INR 1.04 0.90 - 1.10   CBC Auto Differential    Collection Time: 08/15/20 10:24 PM   Result Value Ref Range    WBC 13.27 (H) 3.40 - 10.80 10*3/mm3    RBC 3.98 3.77 - 5.28 10*6/mm3    Hemoglobin 12.3 12.0 - 15.9 g/dL    Hematocrit 38.0 34.0 - 46.6 %    MCV 95.5 79.0 - 97.0 fL    MCH 30.9 26.6 - 33.0 pg    MCHC 32.4 31.5 - 35.7 g/dL    RDW 13.4 12.3 - 15.4 %    RDW-SD 48.0 37.0 - 54.0 fl    MPV 9.2 6.0 - 12.0 fL    Platelets 257 140 - 450 10*3/mm3    Neutrophil % 87.4 (H) 42.7 - 76.0 %    Lymphocyte % 4.4 (L) 19.6 - 45.3 %    Monocyte % 6.9 5.0 - 12.0 %    Eosinophil % 0.3 0.3 - 6.2 %    Basophil % 0.6 0.0 - 1.5 %    Immature Grans % 0.4 0.0 - 0.5 %     "Neutrophils, Absolute 11.61 (H) 1.70 - 7.00 10*3/mm3    Lymphocytes, Absolute 0.58 (L) 0.70 - 3.10 10*3/mm3    Monocytes, Absolute 0.91 (H) 0.10 - 0.90 10*3/mm3    Eosinophils, Absolute 0.04 0.00 - 0.40 10*3/mm3    Basophils, Absolute 0.08 0.00 - 0.20 10*3/mm3    Immature Grans, Absolute 0.05 0.00 - 0.05 10*3/mm3    nRBC 0.0 0.0 - 0.2 /100 WBC   Basic Metabolic Panel    Collection Time: 08/16/20  4:36 AM   Result Value Ref Range    Glucose 111 (H) 65 - 99 mg/dL    BUN 19 8 - 23 mg/dL    Creatinine 1.10 (H) 0.57 - 1.00 mg/dL    Sodium 134 (L) 136 - 145 mmol/L    Potassium 4.2 3.5 - 5.2 mmol/L    Chloride 98 98 - 107 mmol/L    CO2 23.3 22.0 - 29.0 mmol/L    Calcium 9.4 8.6 - 10.5 mg/dL    eGFR Non African Amer 48 (L) >60 mL/min/1.73    BUN/Creatinine Ratio 17.3 7.0 - 25.0    Anion Gap 12.7 5.0 - 15.0 mmol/L   CBC (No Diff)    Collection Time: 08/16/20  4:36 AM   Result Value Ref Range    WBC 7.77 3.40 - 10.80 10*3/mm3    RBC 3.60 (L) 3.77 - 5.28 10*6/mm3    Hemoglobin 11.4 (L) 12.0 - 15.9 g/dL    Hematocrit 34.0 34.0 - 46.6 %    MCV 94.4 79.0 - 97.0 fL    MCH 31.7 26.6 - 33.0 pg    MCHC 33.5 31.5 - 35.7 g/dL    RDW 13.4 12.3 - 15.4 %    RDW-SD 46.9 37.0 - 54.0 fl    MPV 8.8 6.0 - 12.0 fL    Platelets 197 140 - 450 10*3/mm3       IMPRESSION:  Patient is a 80 y.o. Not  or  female with left psoas hematoma    PLAN:   · Admited to: Jonathan Gutierrez MD  · Weight Bearing:Left Lower Extremity Weight Bearing As Tolerated  · Disposition: No need for acute orthopedic intervention.  Patient will require physical therapy and mobilization.    R \"Tank\" Vikash MCCANN MD  Orthopaedic Surgery  [facility]  [phone] - [place] [facility]  [phone] - [place] [facility]            "

## 2020-08-16 NOTE — PLAN OF CARE
Pt admitted from ER for a fall r/t a torn muscle. VSS. Pain controlled with [place]. Voiding per cruzito. Waiting input from PT. Will continue to monitor.

## 2020-08-16 NOTE — PLAN OF CARE
"Pt admitted after a couple recent falls, her \"leg gave out from under her\". CT shows L psoas hematoma, no evidence of fracture. She has history of bilateral TKA. Signs/symptoms are consistent with psoas strain in addition she has lateral hip pain that is worse with resistance and greater trochanter palpation. Therefore, seems like external rotator/abductor muscle strain also. She lives alone with her dog and has 10 stairs to enter. Plan is to attempt stairs tomorrow, if able to perform d/c home with HHPT or OPPT.   "

## 2020-08-16 NOTE — ED PROVIDER NOTES
EMERGENCY DEPARTMENT ENCOUNTER    CHIEF COMPLAINT  Chief Complaint: Left hip pain  History given by: Patient  History limited by: Nothing  Room Number: 37/37  PMD: Rhoda Luna APRN   Orthopedist: Dr. Harrington    HPI:  Pt is a 80 y.o. female who presents with left hip pain onset today.  Patient reports this morning she crossed her legs and noted that she had some pain to her left inner groin area however was able to ambulate with some mild discomfort.  Patient reports she was cleaning up after her dog outside and suddenly felt left hip pain and was unable to support her weight secondary to pain and she fell onto her left side hitting her left elbow and shoulder.  Patient reports she did not hit her head, did not lose consciousness and denies neck and back pain.  Patient reports some minor abrasions to her left arm and mild discomfort to her left shoulder.  Patient denies chest pain, shortness of air, loss of consciousness, abdominal pain, nausea/vomiting, weakness, numbness or incontinence that is new for her.    Duration: A few hours  Intensity/Severity: Moderate to severe  Associated Symptoms: Nothing  Aggravating Factors: Weightbearing or any movement  Alleviating Factors: Rest  Previous Episodes: No  Treatment before arrival: EMS transport    PAST MEDICAL HISTORY  Active Ambulatory Problems     Diagnosis Date Noted   • Postoperative hypothyroidism    • Essential hypertension    • Hyperlipidemia    • Stage 3 chronic kidney disease (CMS/HCC)    • Osteoarthritis of right knee 10/29/2014   • Osteoporosis, senile 06/12/2018     Resolved Ambulatory Problems     Diagnosis Date Noted   • No Resolved Ambulatory Problems     Past Medical History:   Diagnosis Date   • Age related osteoporosis    • Anemia    • Chronic gastritis    • Chronic renal insufficiency    • Gastric ulcer    • GERD (gastroesophageal reflux disease)    • Greater trochanteric bursitis    • H/O bone density study 11/2012   • H/O mammogram  10/28/2015   • Hypertension    • Hypothyroidism    • Lumbago    • Osteoarthrosis    • PONV (postoperative nausea and vomiting)        PAST SURGICAL HISTORY  Past Surgical History:   Procedure Laterality Date   • COLONOSCOPY  2006   • COLONOSCOPY N/A 2016    Procedure: COLONOSCOPY into cecum/terminal ileum;  Surgeon: Huy Sam MD;  Location: [facility] ENDOSCOPY;  Service:    • ENDOSCOPY N/A 2016    Procedure: ESOPHAGOGASTRODUODENOSCOPY with biopsy;  Surgeon: Huy Sam MD;  Location: [facility] ENDOSCOPY;  Service:    • ENDOSCOPY N/A 2016    Procedure: ESOPHAGOGASTRODUODENOSCOPY WITH BIOPSIES (COLD);  Surgeon: Huy Sam MD;  Location: [facility] ENDOSCOPY;  Service:    • FOOT SURGERY     • KNEE SURGERY     • PAP SMEAR  2013   • TONSILLECTOMY     • TOTAL THYROIDECTOMY     • UPPER GASTROINTESTINAL ENDOSCOPY  2014    Frediary Gonzalo Grade IV reflux esophagitis, gastric ulcer w/clean base, chronic gastritis, no h-pylori       FAMILY HISTORY  Family History   Problem Relation Age of Onset   • Colon cancer Mother    • Kidney cancer Brother        SOCIAL HISTORY  Social History     Socioeconomic History   • Marital status:      Spouse name: Not on file   • Number of children: Not on file   • Years of education: Not on file   • Highest education level: Not on file   Tobacco Use   • Smoking status: Former Smoker     Packs/day: 0.25     Types: Cigarettes     Last attempt to quit: 2008     Years since quittin.6   • Smokeless tobacco: Never Used   Substance and Sexual Activity   • Alcohol use: No   • Drug use: No   • Sexual activity: Defer       ALLERGIES  Codeine    REVIEW OF SYSTEMS  Review of Systems   Constitutional: Negative for chills and fever.   HENT: Negative for sore throat and trouble swallowing.    Eyes: Negative for visual disturbance.   Respiratory: Negative for cough and shortness of breath.    Cardiovascular: Negative for chest pain, palpitations and leg swelling.    Gastrointestinal: Negative for abdominal pain, diarrhea and vomiting.   Endocrine: Negative.    Genitourinary: Negative for decreased urine volume, dysuria and frequency.   Musculoskeletal: Positive for arthralgias ( Left hip). Negative for neck pain.   Skin: Negative for rash.   Allergic/Immunologic: Negative.    Neurological: Negative for syncope, weakness, numbness and headaches.   Hematological: Negative.    Psychiatric/Behavioral: Negative.    All other systems reviewed and are negative.      PHYSICAL EXAM  ED Triage Vitals   Temp Heart Rate Resp BP SpO2   08/15/20 1844 08/15/20 1844 08/15/20 1844 08/15/20 1845 08/15/20 1844   97.7 °F (36.5 °C) 71 16 (!) 187/92 96 %      Temp src Heart Rate Source Patient Position BP Location FiO2 (%)   08/15/20 1844 08/15/20 1844 08/15/20 1845 08/15/20 1845 --   Tympanic Monitor Sitting Right arm        Physical Exam   Constitutional: She is oriented to person, place, and time.  Non-toxic appearance. She appears distressed (mild).   HENT:   Head: Normocephalic and atraumatic.   Eyes: EOM are normal.   Neck: Normal range of motion. Neck supple.   Cardiovascular: Normal rate, regular rhythm, normal heart sounds and intact distal pulses.   No murmur heard.  Pulses:       Posterior tibial pulses are 2+ on the right side, and 2+ on the left side.   Pulmonary/Chest: Effort normal and breath sounds normal. No respiratory distress. She exhibits no tenderness.   Abdominal: Soft. Bowel sounds are normal. There is no tenderness. There is no rebound and no guarding.   Musculoskeletal: She exhibits no edema or deformity.        Left hip: She exhibits no deformity.   Patient reports exquisite pain to even minor range of motion of left hip.   Neurological: She is alert and oriented to person, place, and time.   Skin: Skin is warm and dry.   Minor abrasions over left elbow   Psychiatric: Affect normal.   Nursing note and vitals reviewed.      LAB RESULTS  Lab Results (last 24 hours)      Procedure Component Value Units Date/Time    CBC & Differential [[id number]] Collected:  08/15/20 2224    Specimen:  Blood Updated:  08/15/20 2238    Narrative:       The following orders were created for panel order CBC & Differential.  Procedure                               Abnormality         Status                     ---------                               -----------         ------                     CBC Auto Differential[[id number]]        Abnormal            Final result                 Please view results for these tests on the individual orders.    Comprehensive Metabolic Panel [[id number]]  (Abnormal) Collected:  08/15/20 2224    Specimen:  Blood Updated:  08/15/20 2258     Glucose 113 mg/dL      BUN 20 mg/dL      Creatinine 1.11 mg/dL      Sodium 134 mmol/L      Potassium 4.6 mmol/L      Chloride 97 mmol/L      CO2 23.7 mmol/L      Calcium 10.0 mg/dL      Total Protein 7.1 g/dL      Albumin 4.40 g/dL      ALT (SGPT) 21 U/L      AST (SGOT) 18 U/L      Alkaline Phosphatase 49 U/L      Total Bilirubin 0.5 mg/dL      eGFR Non African Amer 47 mL/min/1.73      Globulin 2.7 gm/dL      A/G Ratio 1.6 g/dL      BUN/Creatinine Ratio 18.0     Anion Gap 13.3 mmol/L     Narrative:       GFR Normal >60  Chronic Kidney Disease <60  Kidney Failure <15      Protime-INR [[id number]]  (Normal) Collected:  08/15/20 2224    Specimen:  Blood Updated:  08/15/20 2251     Protime 13.5 Seconds      INR 1.04    CBC Auto Differential [[id number]]  (Abnormal) Collected:  08/15/20 2224    Specimen:  Blood Updated:  08/15/20 2238     WBC 13.27 10*3/mm3      RBC 3.98 10*6/mm3      Hemoglobin 12.3 g/dL      Hematocrit 38.0 %      MCV 95.5 fL      MCH 30.9 pg      MCHC 32.4 g/dL      RDW 13.4 %      RDW-SD 48.0 fl      MPV 9.2 fL      Platelets 257 10*3/mm3      Neutrophil % 87.4 %      Lymphocyte % 4.4 %      Monocyte % 6.9 %      Eosinophil % 0.3 %      Basophil % 0.6 %      Immature Grans % 0.4 %      Neutrophils, Absolute 11.61 10*3/mm3       Lymphocytes, Absolute 0.58 10*3/mm3      Monocytes, Absolute 0.91 10*3/mm3      Eosinophils, Absolute 0.04 10*3/mm3      Basophils, Absolute 0.08 10*3/mm3      Immature Grans, Absolute 0.05 10*3/mm3      nRBC 0.0 /100 WBC           I ordered the above labs and reviewed the results    RADIOLOGY  XR Hip With or Without Pelvis 2 - 3 View Left   Final Result      CT Pelvis Without Contrast    (Results Pending)   Hip-NAD    I ordered the above noted radiological studies. Interpreted by radiologist. . Viewed by me in PACS.     1215  CT pelvis results pending...    PROCEDURES  Procedures      PROGRESS AND CONSULTS                                        1135  Pt and family aware they await CT results.    1215  Discuss with Ms Nikolai CEDEÑO who will follow CT imaging results and agrees to send for outpt follow up if CT neg and able to ambulate  And admit if unable to ambulate for any reason.  She is aware if outpatient prescriptions not needed they need to be discontinued and shredded.    MEDICAL DECISION MAKING  Results were reviewed/discussed with the patient and they were also made aware of online access. Pt also made aware that some labs, such as cultures, will not be resulted during ER visit and follow up with PMD is necessary.       MDM       DIAGNOSIS  Final diagnoses:   Fall, initial encounter   Contusion of hip and thigh, left, initial encounter   Essential hypertension       DISPOSITION  Pending CT imaging    Latest Documented Vital Signs:  As of 23:24  BP- (!) 142/104 HR- 61 Temp- 97.7 °F (36.5 °C) (Tympanic) O2 sat- 98%    --  Patient was wearing facemask when I entered the room and throughout our encounter. Full protective equipment was worn throughout this patient encounter including a face mask, eye protection and gloves. Hand hygiene was performed before donning protective equipment and after removal when leaving the room.      Kristen Carvajal MD  08/17/20 [id number]

## 2020-08-16 NOTE — H&P
Patient Name:  Sylvia Stover  YOB: 1940  MRN:  [id number]  Admit Date:  8/15/2020  Patient Care Team:  Rhoda Luna APRN as PCP - General (Internal Medicine)  Rhoda Luna APRN as PCP - Claims Attributed  Jadiel Spence MD as Consulting Physician (Nephrology)      Subjective   History Present Illness     Chief Complaint   Patient presents with   • Fall   • Hip Pain       Ms. Stover is a 80 y.o. former smoker with a history of hypertension, hyperlipidemia, hypothyroidism, and chronic kidney disease stage 3 that presents to [facility] complaining of a fall and left hip pain.  She reports that she was attempting to tie her shoes yesterday morning, and when she went to lift her left leg over the right, she felt as though she pulled a muscle in her left groin.  She states that around 4 PM yesterday, she was walking her dog and her left leg went out from under her.  She states she landed on her left side.  She reports she was unable to get up by herself and called her neighbor for help.  She denies head injury and loss of consciousness.  She reports pain in the left hip that is described as intermittent, severe, and sharp in nature.  She states movement exacerbates the pain and lying still is alleviating.  She reports that she is unable to ambulate.  She denies chest pain, shortness of breath, paresthesias, and weakness.  Work up in the ED revealed Na 134, creat 1.11, GFR 47, and WBC 13.27.  X-ray of the left hip was negative for an acute fracture.  CT of the pelvis showed mild diffuse enlargement of the left psoas muscle extending into the left inguinal region most likely due to hemorrhage.  There is no evidence of hip fracture.            History of Present Illness  Review of Systems   Constitutional: Negative.  Negative for chills and fever.   HENT: Negative.  Negative for congestion and sore throat.    Eyes: Negative.  Negative for photophobia and visual  disturbance.   Respiratory: Negative.  Negative for cough and shortness of breath.    Cardiovascular: Negative.  Negative for chest pain and palpitations.   Gastrointestinal: Negative.  Negative for abdominal pain, nausea and vomiting.   Endocrine: Negative for polydipsia, polyphagia and polyuria.   Genitourinary: Negative for dysuria, flank pain and urgency.   Musculoskeletal: Positive for arthralgias (left hip), gait problem and myalgias (left groin).   Skin: Negative.  Negative for rash and wound.   Neurological: Negative for dizziness, weakness, numbness and headaches.   Psychiatric/Behavioral: Negative.         Personal History     Past Medical History:   Diagnosis Date   • Age related osteoporosis    • Anemia    • Chronic gastritis    • Chronic renal insufficiency    • Gastric ulcer    • GERD (gastroesophageal reflux disease)    • Greater trochanteric bursitis    • H/O bone density study 11/2012    Osteopenia   • H/O mammogram 10/28/2015    stable   • Hyperlipidemia    • Hypertension    • Hypothyroidism    • Lumbago    • Osteoarthrosis    • PONV (postoperative nausea and vomiting)      Past Surgical History:   Procedure Laterality Date   • COLONOSCOPY  07/06/2006   • COLONOSCOPY N/A 5/12/2016    Procedure: COLONOSCOPY into cecum/terminal ileum;  Surgeon: Huy Sam MD;  Location: [facility] ENDOSCOPY;  Service:    • ENDOSCOPY N/A 5/12/2016    Procedure: ESOPHAGOGASTRODUODENOSCOPY with biopsy;  Surgeon: Huy Sam MD;  Location: [facility] ENDOSCOPY;  Service:    • ENDOSCOPY N/A 8/12/2016    Procedure: ESOPHAGOGASTRODUODENOSCOPY WITH BIOPSIES (COLD);  Surgeon: Huy Sam MD;  Location: [facility] ENDOSCOPY;  Service:    • FOOT SURGERY     • KNEE SURGERY     • PAP SMEAR  09/17/2013   • TONSILLECTOMY     • TOTAL THYROIDECTOMY     • UPPER GASTROINTESTINAL ENDOSCOPY  04/21/2014    Janene Sánchez Grade IV reflux esophagitis, gastric ulcer w/clean base, chronic gastritis, no h-pylori     Family History   Problem  Relation Age of Onset   • Colon cancer Mother    • Kidney cancer Brother      Social History     Tobacco Use   • Smoking status: Former Smoker     Packs/day: 0.25     Types: Cigarettes     Last attempt to quit: 2008     Years since quittin.6   • Smokeless tobacco: Never Used   Substance Use Topics   • Alcohol use: No   • Drug use: No       (Not in a hospital admission)  Allergies:    Allergies   Allergen Reactions   • Codeine        Objective    Objective     Vital Signs  Temp:  [97.7 °F (36.5 °C)] 97.7 °F (36.5 °C)  Heart Rate:  [52-71] 52  Resp:  [16] 16  BP: (110-187)/() 125/67  SpO2:  [96 %-100 %] 97 %  on   ;   Device (Oxygen Therapy): room air  Body mass index is 27.4 kg/m².    Physical Exam   Constitutional: She is oriented to person, place, and time. She appears well-developed and well-nourished.   HENT:   Head: Normocephalic and atraumatic.   Eyes: Conjunctivae and EOM are normal.   Neck: Normal range of motion. Neck supple.   Cardiovascular: Normal rate, regular rhythm, normal heart sounds and intact distal pulses.   No murmur heard.  Pulmonary/Chest: Effort normal and breath sounds normal.   Abdominal: Soft. Bowel sounds are normal.   Musculoskeletal: She exhibits tenderness (left groin/left hip). She exhibits no edema.   Neurological: She is alert and oriented to person, place, and time.   Skin: Skin is warm and dry.   Dressing to left elbow clean, dry, intact   Psychiatric: She has a normal mood and affect. Her behavior is normal.   Nursing note and vitals reviewed.      Results Review:  I reviewed the patient's new clinical results.  I reviewed the patient's new imaging results and agree with the interpretation.  I reviewed the patient's other test results and agree with the interpretation  I personally viewed and interpreted the patient's EKG/Telemetry data  Discussed with ED provider.    Lab Results (last 24 hours)     Procedure Component Value Units Date/Time    CBC & Differential  [[id number]] Collected:  08/15/20 2224    Specimen:  Blood Updated:  08/15/20 2238    Narrative:       The following orders were created for panel order CBC & Differential.  Procedure                               Abnormality         Status                     ---------                               -----------         ------                     CBC Auto Differential[[id number]]        Abnormal            Final result                 Please view results for these tests on the individual orders.    Comprehensive Metabolic Panel [[id number]]  (Abnormal) Collected:  08/15/20 2224    Specimen:  Blood Updated:  08/15/20 2258     Glucose 113 mg/dL      BUN 20 mg/dL      Creatinine 1.11 mg/dL      Sodium 134 mmol/L      Potassium 4.6 mmol/L      Chloride 97 mmol/L      CO2 23.7 mmol/L      Calcium 10.0 mg/dL      Total Protein 7.1 g/dL      Albumin 4.40 g/dL      ALT (SGPT) 21 U/L      AST (SGOT) 18 U/L      Alkaline Phosphatase 49 U/L      Total Bilirubin 0.5 mg/dL      eGFR Non African Amer 47 mL/min/1.73      Globulin 2.7 gm/dL      A/G Ratio 1.6 g/dL      BUN/Creatinine Ratio 18.0     Anion Gap 13.3 mmol/L     Narrative:       GFR Normal >60  Chronic Kidney Disease <60  Kidney Failure <15      Protime-INR [[id number]]  (Normal) Collected:  08/15/20 2224    Specimen:  Blood Updated:  08/15/20 2251     Protime 13.5 Seconds      INR 1.04    CBC Auto Differential [[id number]]  (Abnormal) Collected:  08/15/20 2224    Specimen:  Blood Updated:  08/15/20 2238     WBC 13.27 10*3/mm3      RBC 3.98 10*6/mm3      Hemoglobin 12.3 g/dL      Hematocrit 38.0 %      MCV 95.5 fL      MCH 30.9 pg      MCHC 32.4 g/dL      RDW 13.4 %      RDW-SD 48.0 fl      MPV 9.2 fL      Platelets 257 10*3/mm3      Neutrophil % 87.4 %      Lymphocyte % 4.4 %      Monocyte % 6.9 %      Eosinophil % 0.3 %      Basophil % 0.6 %      Immature Grans % 0.4 %      Neutrophils, Absolute 11.61 10*3/mm3      Lymphocytes, Absolute 0.58 10*3/mm3      Monocytes,  Absolute 0.91 10*3/mm3      Eosinophils, Absolute 0.04 10*3/mm3      Basophils, Absolute 0.08 10*3/mm3      Immature Grans, Absolute 0.05 10*3/mm3      nRBC 0.0 /100 WBC     COVID PRE-OP / PRE-PROCEDURE SCREENING ORDER (NO ISOLATION) - Swab, Nasopharynx [[id number]] Collected:  08/16/20 0253    Specimen:  Swab from Nasopharynx Updated:  08/16/20 0259    Narrative:       The following orders were created for panel order COVID PRE-OP / PRE-PROCEDURE SCREENING ORDER (NO ISOLATION) - Swab, Nasopharynx.  Procedure                               Abnormality         Status                     ---------                               -----------         ------                     COVID-19,BIOTAP, NP/OP S...[[id number]]                      In process                   Please view results for these tests on the individual orders.    COVID-19,BIOTAP, NP/OP SWAB IN TRANSPORT MEDIA OR SALINE 24-36 HR TAT - Swab, Nasopharynx [[id number]] Collected:  08/16/20 0253    Specimen:  Swab from Nasopharynx Updated:  08/16/20 0259          Imaging Results (Last 24 Hours)     Procedure Component Value Units Date/Time    CT Pelvis Without Contrast [[id number]] Collected:  08/16/20 0030     Updated:  08/16/20 0044    Narrative:       CT PELVIS WO CONTRAST-     CLINICAL HISTORY: Left hip pain.     TECHNIQUE: Multiple axial 2 mm thick images were obtained through the  pelvis and hips. Coronal and sagittal reconstructions were produced.     Radiation dose reduction techniques were utilized, including automated  exposure control and exposure modulation based on body size.     COMPARISON: CT scan of the abdomen and pelvis dated 04/19/2014.     FINDINGS: Both hip joints are mildly narrowed, but are otherwise  unremarkable. There is no bony erosion. There is no evidence of recent  or old fracture or subluxation. There is diffuse enlargement of the left  psoas muscle extending inferiorly into the left inguinal region that is  most likely due to  hemorrhage.       Impression:       Mild diffuse enlargement of the left psoas muscle extending  into the left inguinal region most likely due to hemorrhage. Otherwise  unremarkable CT scan of the pelvis. There is no evidence of hip  fracture.     This report was finalized on 8/16/2020 12:41 AM by Dr. Jerome Rivero M.D.       XR Hip With or Without Pelvis 2 - 3 View Left [[id number]] Collected:  08/15/20 2147     Updated:  08/15/20 2151    Narrative:       PELVIS AND LEFT HIP X-RAYS     CLINICAL HISTORY: Left hip pain     An AP view the pelvis and AP and frog-leg lateral views of the left hip  were obtained. No bony or articular abnormalities are identified. There  is no evidence of recent or old fracture or subluxation.     This report was finalized on 8/15/2020 9:48 PM by Dr. Jerome Rivero M.D.                  No orders to display        Assessment/Plan     Active Hospital Problems    Diagnosis POA   • **Fall [W19.XXXA] Yes   • Hypothyroidism [E03.9] Unknown   • GERD (gastroesophageal reflux disease) [K21.9] Unknown   • Osteoporosis [M81.0] Unknown   • Essential hypertension [I10] Yes   • Hyperlipidemia [E78.5] Yes   • Stage 3 chronic kidney disease (CMS/HCC) [N18.3] Yes     Fall  -Mild hemorrhage of left psoas muscle on CT.  She is on Aspirin, but no other anticoagulant. Will hold Aspirin and monitor for now  -PRN morphine for pain  -PT/OT consults  -Mild leukocytosis. Most likely reactive, repeat CBC in AM  -She reports that she lives alone. Will consult CCP for discharge planning    Hypertension  -Blood pressures stable. Continue home regimen  -Monitor     Chronic Kidney Disease Stage 3  -Creat 1.11, baseline 1.32-1.53  -GFR 47, baseline 33-39  -Slow IVF overnight  -Avoid nephrotoxins  -Repeat lab work in AM    Hyperlipidemia  -Hold Aspirin as previously discussed. Continue statin    Hypothyroidism  -Continue Levothyroxine    GERD/Chronic Gastritis  -Continue Pepcid    -I discussed the patients findings and  my recommendations with patient.    VTE Prophylaxis - SCDs.  Code Status - Full code.       JUNE Jackson  [facility]  08/16/20  03:37

## 2020-08-16 NOTE — THERAPY EVALUATION
Patient Name: Sylvia Stover  : 1940    MRN: [id number]                              Today's Date: 2020       Admit Date: 8/15/2020    Visit Dx:     ICD-10-CM ICD-9-CM   1. Fall, initial encounter W19.XXXA E888.9   2. Contusion of hip and thigh, left, initial encounter S70.02XA 924.00    S70.12XA 924.01   3. Essential hypertension I10 401.9   4. Lumbar muscle hematoma, initial encounter S30.0XXA 922.31     Patient Active Problem List   Diagnosis   • Postoperative hypothyroidism   • Essential hypertension   • Hyperlipidemia   • Stage 3 chronic kidney disease (CMS/HCC)   • Osteoarthritis of right knee   • Osteoporosis, senile   • Fall   • Hypothyroidism   • GERD (gastroesophageal reflux disease)   • Osteoporosis     Past Medical History:   Diagnosis Date   • Age related osteoporosis    • Anemia    • Chronic gastritis    • Chronic renal insufficiency    • Gastric ulcer    • GERD (gastroesophageal reflux disease)    • Greater trochanteric bursitis    • H/O bone density study 2012    Osteopenia   • H/O mammogram 10/28/2015    stable   • Hyperlipidemia    • Hypertension    • Hypothyroidism    • Lumbago    • Osteoarthrosis    • PONV (postoperative nausea and vomiting)      Past Surgical History:   Procedure Laterality Date   • COLONOSCOPY  2006   • COLONOSCOPY N/A 2016    Procedure: COLONOSCOPY into cecum/terminal ileum;  Surgeon: Huy Sam MD;  Location: [facility] ENDOSCOPY;  Service:    • ENDOSCOPY N/A 2016    Procedure: ESOPHAGOGASTRODUODENOSCOPY with biopsy;  Surgeon: Huy Sam MD;  Location: [facility] ENDOSCOPY;  Service:    • ENDOSCOPY N/A 2016    Procedure: ESOPHAGOGASTRODUODENOSCOPY WITH BIOPSIES (COLD);  Surgeon: Huy Sam MD;  Location: [facility] ENDOSCOPY;  Service:    • FOOT SURGERY     • KNEE SURGERY     • PAP SMEAR  2013   • TONSILLECTOMY     • TOTAL THYROIDECTOMY     • UPPER GASTROINTESTINAL ENDOSCOPY  2014    Janene Sánchez Grade IV reflux esophagitis,  gastric ulcer w/clean base, chronic gastritis, no h-pylori     General Information     Row Name 08/16/20 1441          PT Evaluation Time/Intention    Document Type  evaluation  -CS     Mode of Treatment  physical therapy  -CS     Row Name 08/16/20 1441          General Information    Patient Profile Reviewed?  yes  -CS     Prior Level of Function  independent:;all household mobility;community mobility  -CS     Existing Precautions/Restrictions  fall  -CS     Row Name 08/16/20 1441          Resource/Environmental Concerns    Current Living Arrangements  home/apartment/condo  -CS     Row Name 08/16/20 1441          Home Main Entrance    Number of Stairs, Main Entrance  ten  -CS     Row Name 08/16/20 1441          Cognitive Assessment/Intervention- PT/OT    Orientation Status (Cognition)  oriented x 3  -CS     Row Name 08/16/20 1441          Safety Issues, Functional Mobility    Safety Issues Affecting Function (Mobility)  insight into deficits/self awareness  -CS     Impairments Affecting Function (Mobility)  pain;range of motion (ROM);strength  -CS       User Key  (r) = Recorded By, (t) = Taken By, (c) = Cosigned By    Initials Name Provider Type    CS Jan Oro, PT Physical Therapist        Mobility     Row Name 08/16/20 1442          Bed Mobility Assessment/Treatment    Bed Mobility Assessment/Treatment  supine-sit  -CS     Supine-Sit [place] (Bed Mobility)  minimum assist (75% patient effort)  -CS     Assistive Device (Bed Mobility)  bed rails;head of bed elevated  -CS     Row Name 08/16/20 1442          Bed-Chair Transfer    Bed-Chair [place] (Transfers)  contact guard  -CS     Assistive Device (Bed-Chair Transfers)  walker, front-wheeled  -CS     Row Name 08/16/20 1442          Sit-Stand Transfer    Sit-Stand [place] (Transfers)  contact guard  -CS     Assistive Device (Sit-Stand Transfers)  walker, front-wheeled  -CS     Row Name 08/16/20 1442          Gait/Stairs Assessment/Training     Gait/Stairs Assessment/Training  gait/ambulation assistive device  -CS     [place] Level (Gait)  minimum assist (75% patient effort);verbal cues;nonverbal cues (demo/gesture)  -CS     Assistive Device (Gait)  walker, front-wheeled  -CS     Distance in Feet (Gait)  50  -CS     Deviations/Abnormal Patterns (Gait)  gait speed decreased;antalgic;left sided deviations  -CS     Bilateral Gait Deviations  forward flexed posture;hip hiking  -CS     Comment (Gait/Stairs)  pain/weakness limiting distance walked.   -     Row Name 08/16/20 1442          Mobility Assessment/Intervention    Extremity Weight-bearing Status  left lower extremity  -CS     Left Lower Extremity (Weight-bearing Status)  weight-bearing as tolerated (WBAT)  -CS       User Key  (r) = Recorded By, (t) = Taken By, (c) = Cosigned By    Initials Name Provider Type    Jan Willoughby, PT Physical Therapist        Obj/Interventions     Row Name 08/16/20 1447          General ROM    GENERAL ROM COMMENTS  L Hip PROM: not painful or limited in ER, IR, flexion, ab/adduction  -[facility] Name 08/16/20 1447          MMT (Manual Muscle Testing)    General MMT Comments  L Hip AROM: flexion 3/5, abd 2+/5, add 4/5. L Knee extension: 4/5   -     Row Name 08/16/20 1447          Therapeutic Exercise    Comment (Therapeutic Exercise)  AP, QS, GS x10 LLE   -       User Key  (r) = Recorded By, (t) = Taken By, (c) = Cosigned By    Initials Name Provider Type    Jan Willoughby, PT Physical Therapist        Goals/Plan     Row Name 08/16/20 1452          Bed Mobility Goal 1 (PT)    Activity/Assistive Device (Bed Mobility Goal 1, PT)  sit to supine;supine to sit  -CS     [place] Level/Cues Needed (Bed Mobility Goal 1, PT)  conditional independence  -CS     Time Frame (Bed Mobility Goal 1, PT)  1 week  -     Row Name 08/16/20 1452          Transfer Goal 1 (PT)    Activity/Assistive Device (Transfer Goal 1, PT)   sit-to-stand/stand-to-sit;bed-to-chair/chair-to-bed  -CS     [place] Level/Cues Needed (Transfer Goal 1, PT)  conditional independence  -CS     Time Frame (Transfer Goal 1, PT)  1 week  -CS     Row Name 08/16/20 1452          Gait Training Goal 1 (PT)    Activity/Assistive Device (Gait Training Goal 1, PT)  assistive device use  -CS     [place] Level (Gait Training Goal 1, PT)  conditional independence  -CS     Distance (Gait Goal 1, PT)  300  -CS     Time Frame (Gait Training Goal 1, PT)  1 week  -CS     Row Name 08/16/20 1452          Stairs Goal 1 (PT)    [place] Level/Cues Needed (Stairs Goal 1, PT)  conditional independence  -CS     Number of Stairs (Stairs Goal 1, PT)  10  -CS     Time Frame (Stairs Goal 1, PT)  1 week  -CS       User Key  (r) = Recorded By, (t) = Taken By, (c) = Cosigned By    Initials Name Provider Type    CS Jan Oro, PT Physical Therapist        Clinical Impression     Row Name 08/16/20 9950          Pain Assessment    Additional Documentation  Pain Scale: FACES Pre/Post-Treatment (Group)  -CS     [facility] Name 08/16/20 8756          Pain Scale: Numbers Pre/Post-Treatment    Pain Location - Orientation  anterior and lateral  -CS     Pain Location  hip  -CS     Pain Intervention(s)  Repositioned;Ambulation/increased activity  -CS     [facility] Name 08/16/20 7153          Pain Scale: FACES Pre/Post-Treatment    Pain: FACES Scale, Pretreatment  4-->hurts little more  -CS     Pain: FACES Scale, Post-Treatment  4-->hurts little more  -CS     Row Name 08/16/20 6736          Plan of Care Review    Plan of Care Reviewed With  patient  -CS     [facility] Name 08/16/20 9324          Physical Therapy Clinical Impression    Patient/Family Goals Statement (PT Clinical Impression)  home  -CS     Criteria for Skilled Interventions Met (PT Clinical Impression)  yes  -CS     Rehab Potential (PT Clinical Summary)  good, to achieve stated therapy goals  -CS     Row Name 08/16/20 1306           Positioning and Restraints    Pre-Treatment Position  in bed  -CS     Post Treatment Position  chair  -CS     In Chair  reclined;call light within reach;encouraged to call for assist;exit alarm on  -CS       User Key  (r) = Recorded By, (t) = Taken By, (c) = Cosigned By    Initials Name Provider Type    Jan Willoughby, PT Physical Therapist        Outcome Measures     Row Name 08/16/20 1453          How much help from another person do you currently need...    Turning from your back to your side while in flat bed without using bedrails?  3  -CS     Moving from lying on back to sitting on the side of a flat bed without bedrails?  3  -CS     Moving to and from a bed to a chair (including a wheelchair)?  3  -CS     Standing up from a chair using your arms (e.g., wheelchair, bedside chair)?  3  -CS     Climbing 3-5 steps with a railing?  2  -CS     To walk in hospital room?  3  -CS     AM-PAC 6 Clicks Score (PT)  17  -CS     Row Name 08/16/20 1450          Functional Assessment    Outcome Measure Options  AM-PAC 6 Clicks Basic Mobility (PT)  -CS       User Key  (r) = Recorded By, (t) = Taken By, (c) = Cosigned By    Initials Name Provider Type    Jan Willoughby, PT Physical Therapist        Physical Therapy Education                 Title: PT OT SLP Therapies (Done)     Topic: Physical Therapy (Done)     Point: Mobility training (Done)     Description:   Instruct learner(s) on safety and technique for assisting patient out of bed, chair or wheelchair.  Instruct in the proper use of assistive devices, such as walker, crutches, cane or brace.              Patient Friendly Description:   It's important to get you on your feet again, but we need to do so in a way that is safe for you. Falling has serious consequences, and your personal safety is the most important thing of all.        When it's time to get out of bed, one of us or a family member will sit next to you on the bed to give you support.     If your  doctor or nurse tells you to use a walker, crutches, a cane, or a brace, be sure you use it every time you get out of bed, even if you think you don't need it.    Learning Progress Summary           Patient Acceptance, E,TB, VU,NR by  at 8/16/2020 1454                   Point: Home exercise program (Done)     Description:   Instruct learner(s) on appropriate technique for monitoring, assisting and/or progressing patient with therapeutic exercises and activities.              Learning Progress Summary           Patient Acceptance, E,TB, VU,NR by  at 8/16/2020 1454                   Point: Body mechanics (Done)     Description:   Instruct learner(s) on proper positioning and spine alignment for patient and/or caregiver during mobility tasks and/or exercises.              Learning Progress Summary           Patient Acceptance, E,TB, VU,NR by  at 8/16/2020 1454                   Point: Precautions (Done)     Description:   Instruct learner(s) on prescribed precautions during mobility and gait tasks              Learning Progress Summary           Patient Acceptance, E,TB, VU,NR by  at 8/16/2020 1454                               User Key     Initials Effective Dates Name Provider Type Discipline     05/14/18 -  Jan Oro, PT Physical Therapist PT              PT Recommendation and Plan  Planned Therapy Interventions (PT Eval): balance training, bed mobility training, gait training, transfer training, ROM (range of motion), stair training, strengthening, home exercise program, patient/family education  Outcome Summary/Treatment Plan (PT)  Anticipated Discharge Disposition (PT): home with home health, skilled nursing facility  Plan of Care Reviewed With: patient     Time Calculation:   PT Charges     Row Name 08/16/20 4403             Time Calculation    Start Time  1402  -      Stop Time  1445  -      Time Calculation (min)  43 min  -      PT Received On  08/16/20  -      PT - Next Appointment   08/17/20  -WENDY      PT Goal Re-Cert Due Date  08/23/20  -        User Key  (r) = Recorded By, (t) = Taken By, (c) = Cosigned By    Initials Name Provider Type    Jan Willoughby, PT Physical Therapist        Therapy Charges for Today     Code Description Service Date Service Provider Modifiers Qty    27887053312 HC PT THER PROC EA 15 MIN 8/16/2020 Jan Oro, PT GP 2    42886436579 HC PT EVAL MOD COMPLEXITY 2 8/16/2020 Jan Oro, PT GP 1          PT G-Codes  Outcome Measure Options: AM-PAC 6 Clicks Basic Mobility (PT)  AM-PAC 6 Clicks Score (PT): 17    Jan Oro, PT  8/16/2020

## 2020-08-16 NOTE — ED NOTES
Nursing report ED to floor  Sylvia Stover  80 y.o.  female    HPI (triage note):   Chief Complaint   Patient presents with   • Fall   • Hip Pain       Admitting doctor:   Jonathan Gutierrez MD    Admitting diagnosis:   The primary encounter diagnosis was Fall, initial encounter. Diagnoses of Contusion of hip and thigh, left, initial encounter, Essential hypertension, and Lumbar muscle hematoma, initial encounter were also pertinent to this visit.    Code status:   Current Code Status     Date Active Code Status Order ID Comments User Context       Not on file          Allergies:   Codeine    Weight:       08/15/20  1856   Weight: 65.8 kg (145 lb)       Most recent vitals:   Vitals:    08/15/20 2300 08/15/20 2330 08/16/20 0000 08/16/20 0130   BP: 135/85 131/73 110/86 135/79   BP Location: Right arm  Right arm    Patient Position: Lying  Lying    Pulse:  64 57 57   Resp: 16   16   Temp:       TempSrc:       SpO2: 96%   96%   Weight:       Height:           Active LDAs/IV Access:   Lines, Drains & Airways    Active LDAs     Name:   Placement date:   Placement time:   Site:   Days:    Peripheral IV 08/15/20 2039 Right Antecubital   08/15/20    2039    Antecubital   less than 1                Labs (abnormal labs have a star):   Labs Reviewed   COMPREHENSIVE METABOLIC PANEL - Abnormal; Notable for the following components:       Result Value    Glucose 113 (*)     Creatinine 1.11 (*)     Sodium 134 (*)     Chloride 97 (*)     eGFR Non  Amer 47 (*)     All other components within normal limits    Narrative:     GFR Normal >60  Chronic Kidney Disease <60  Kidney Failure <15     CBC WITH AUTO DIFFERENTIAL - Abnormal; Notable for the following components:    WBC 13.27 (*)     Neutrophil % 87.4 (*)     Lymphocyte % 4.4 (*)     Neutrophils, Absolute 11.61 (*)     Lymphocytes, Absolute 0.58 (*)     Monocytes, Absolute 0.91 (*)     All other components within normal limits   PROTIME-INR - Normal   COVID PRE-OP /  PRE-PROCEDURE SCREENING ORDER (NO ISOLATION)    Narrative:     The following orders were created for panel order COVID PRE-OP / PRE-PROCEDURE SCREENING ORDER (NO ISOLATION) - Swab, Nasopharynx.  Procedure                               Abnormality         Status                     ---------                               -----------         ------                     COVID-19,BIOTAP, NP/OP S...[[id number]]                                                   Please view results for these tests on the individual orders.   COVID-19,BIOTAP, NP/OP SWAB IN TRANSPORT MEDIA OR SALINE 24-36 HR TAT   CBC AND DIFFERENTIAL    Narrative:     The following orders were created for panel order CBC & Differential.  Procedure                               Abnormality         Status                     ---------                               -----------         ------                     CBC Auto Differential[[id number]]        Abnormal            Final result                 Please view results for these tests on the individual orders.       EKG:   No orders to display       Meds given in ED:   Medications   sodium chloride 0.9 % infusion (125 mL/hr Intravenous Currently Infusing 8/15/20 2240)   lidocaine (LIDODERM) 5 % 1 patch (1 patch Transdermal Medication Applied 8/16/20 0002)   ondansetron (ZOFRAN) injection 4 mg (4 mg Intravenous Given 8/15/20 2040)   morphine injection 2 mg (2 mg Intravenous Given 8/15/20 2040)   Tdap (BOOSTRIX) injection 0.5 mL (0.5 mL Intramuscular Given 8/16/20 0037)       Imaging results:  Ct Pelvis Without Contrast    Result Date: 8/16/2020  Mild diffuse enlargement of the left psoas muscle extending into the left inguinal region most likely due to hemorrhage. Otherwise unremarkable CT scan of the pelvis. There is no evidence of hip fracture.  This report was finalized on 8/16/2020 12:41 AM by Dr. Jerome Rivero M.D.        Ambulatory status:   Unable to ambulate w/out assistance     Social issues:   Social  History     Socioeconomic History   • Marital status:      Spouse name: Not on file   • Number of children: Not on file   • Years of education: Not on file   • Highest education level: Not on file   Tobacco Use   • Smoking status: Former Smoker     Packs/day: 0.25     Types: Cigarettes     Last attempt to quit:      Years since quittin.6   • Smokeless tobacco: Never Used   Substance and Sexual Activity   • Alcohol use: No   • Drug use: No   • Sexual activity: Susan Bear RN  20 0244

## 2020-08-16 NOTE — ED PROVIDER NOTES
EMERGENCY DEPARTMENT ENCOUNTER    Room number:  P884/1  Date Seen:  8/16/2020  Time of transfer:0000  PCP:  Rhoda Luna APRN    Laboratory Results:  Recent Results (from the past 24 hour(s))   Comprehensive Metabolic Panel    Collection Time: 08/15/20 10:24 PM   Result Value Ref Range    Glucose 113 (H) 65 - 99 mg/dL    BUN 20 8 - 23 mg/dL    Creatinine 1.11 (H) 0.57 - 1.00 mg/dL    Sodium 134 (L) 136 - 145 mmol/L    Potassium 4.6 3.5 - 5.2 mmol/L    Chloride 97 (L) 98 - 107 mmol/L    CO2 23.7 22.0 - 29.0 mmol/L    Calcium 10.0 8.6 - 10.5 mg/dL    Total Protein 7.1 6.0 - 8.5 g/dL    Albumin 4.40 3.50 - 5.20 g/dL    ALT (SGPT) 21 1 - 33 U/L    AST (SGOT) 18 1 - 32 U/L    Alkaline Phosphatase 49 39 - 117 U/L    Total Bilirubin 0.5 0.0 - 1.2 mg/dL    eGFR Non African Amer 47 (L) >60 mL/min/1.73    Globulin 2.7 gm/dL    A/G Ratio 1.6 g/dL    BUN/Creatinine Ratio 18.0 7.0 - 25.0    Anion Gap 13.3 5.0 - 15.0 mmol/L   Protime-INR    Collection Time: 08/15/20 10:24 PM   Result Value Ref Range    Protime 13.5 11.7 - 14.2 Seconds    INR 1.04 0.90 - 1.10   CBC Auto Differential    Collection Time: 08/15/20 10:24 PM   Result Value Ref Range    WBC 13.27 (H) 3.40 - 10.80 10*3/mm3    RBC 3.98 3.77 - 5.28 10*6/mm3    Hemoglobin 12.3 12.0 - 15.9 g/dL    Hematocrit 38.0 34.0 - 46.6 %    MCV 95.5 79.0 - 97.0 fL    MCH 30.9 26.6 - 33.0 pg    MCHC 32.4 31.5 - 35.7 g/dL    RDW 13.4 12.3 - 15.4 %    RDW-SD 48.0 37.0 - 54.0 fl    MPV 9.2 6.0 - 12.0 fL    Platelets 257 140 - 450 10*3/mm3    Neutrophil % 87.4 (H) 42.7 - 76.0 %    Lymphocyte % 4.4 (L) 19.6 - 45.3 %    Monocyte % 6.9 5.0 - 12.0 %    Eosinophil % 0.3 0.3 - 6.2 %    Basophil % 0.6 0.0 - 1.5 %    Immature Grans % 0.4 0.0 - 0.5 %    Neutrophils, Absolute 11.61 (H) 1.70 - 7.00 10*3/mm3    Lymphocytes, Absolute 0.58 (L) 0.70 - 3.10 10*3/mm3    Monocytes, Absolute 0.91 (H) 0.10 - 0.90 10*3/mm3    Eosinophils, Absolute 0.04 0.00 - 0.40 10*3/mm3    Basophils, Absolute 0.08 0.00  - 0.20 10*3/mm3    Immature Grans, Absolute 0.05 0.00 - 0.05 10*3/mm3    nRBC 0.0 0.0 - 0.2 /100 WBC   Basic Metabolic Panel    Collection Time: 08/16/20  4:36 AM   Result Value Ref Range    Glucose 111 (H) 65 - 99 mg/dL    BUN 19 8 - 23 mg/dL    Creatinine 1.10 (H) 0.57 - 1.00 mg/dL    Sodium 134 (L) 136 - 145 mmol/L    Potassium 4.2 3.5 - 5.2 mmol/L    Chloride 98 98 - 107 mmol/L    CO2 23.3 22.0 - 29.0 mmol/L    Calcium 9.4 8.6 - 10.5 mg/dL    eGFR Non African Amer 48 (L) >60 mL/min/1.73    BUN/Creatinine Ratio 17.3 7.0 - 25.0    Anion Gap 12.7 5.0 - 15.0 mmol/L   CBC (No Diff)    Collection Time: 08/16/20  4:36 AM   Result Value Ref Range    WBC 7.77 3.40 - 10.80 10*3/mm3    RBC 3.60 (L) 3.77 - 5.28 10*6/mm3    Hemoglobin 11.4 (L) 12.0 - 15.9 g/dL    Hematocrit 34.0 34.0 - 46.6 %    MCV 94.4 79.0 - 97.0 fL    MCH 31.7 26.6 - 33.0 pg    MCHC 33.5 31.5 - 35.7 g/dL    RDW 13.4 12.3 - 15.4 %    RDW-SD 46.9 37.0 - 54.0 fl    MPV 8.8 6.0 - 12.0 fL    Platelets 197 140 - 450 10*3/mm3     I reviewed the above results.    Radiology:  CT Pelvis Without Contrast   Final Result   Mild diffuse enlargement of the left psoas muscle extending   into the left inguinal region most likely due to hemorrhage. Otherwise   unremarkable CT scan of the pelvis. There is no evidence of hip   fracture.       This report was finalized on 8/16/2020 12:41 AM by Dr. Jerome Rivero M.D.          XR Hip With or Without Pelvis 2 - 3 View Left   Final Result        I reviewed the above results    Medications ordered in ED:  Medications   sodium chloride 0.9 % infusion (125 mL/hr Intravenous Currently Infusing 8/16/20 0254)   lidocaine (LIDODERM) 5 % 1 patch (1 patch Transdermal Medication Applied 8/16/20 0002)   sodium chloride 0.9 % flush 10 mL (has no administration in time range)   sodium chloride 0.9 % flush 10 mL (has no administration in time range)   sodium chloride 0.9 % infusion (75 mL/hr Intravenous New Bag 8/16/20 3486)    acetaminophen (TYLENOL) tablet 650 mg (has no administration in time range)     Or   acetaminophen (TYLENOL) 160 MG/5ML solution 650 mg (has no administration in time range)     Or   acetaminophen (TYLENOL) suppository 650 mg (has no administration in time range)   bisacodyl (DULCOLAX) EC tablet 5 mg (has no administration in time range)   ondansetron (ZOFRAN) tablet 4 mg ( Oral Not Given:  See Alt 8/16/20 0635)     Or   ondansetron (ZOFRAN) injection 4 mg (4 mg Intravenous Given 8/16/20 0635)   morphine injection 2 mg (2 mg Intravenous Given 8/16/20 0635)   amLODIPine (NORVASC) tablet 2.5 mg (has no administration in time range)   budesonide (PULMICORT) nebulizer solution 0.5 mg (has no administration in time range)   famotidine (PEPCID) tablet 20 mg (20 mg Oral Given 8/16/20 0703)   levothyroxine (SYNTHROID, LEVOTHROID) tablet 100 mcg (has no administration in time range)   lisinopril (PRINIVIL,ZESTRIL) tablet 20 mg (has no administration in time range)   meclizine (ANTIVERT) tablet 12.5 mg (has no administration in time range)   metoprolol tartrate (LOPRESSOR) tablet 75 mg (has no administration in time range)   atorvastatin (LIPITOR) tablet 10 mg (has no administration in time range)   ondansetron (ZOFRAN) injection 4 mg (4 mg Intravenous Given 8/15/20 2040)   morphine injection 2 mg (2 mg Intravenous Given 8/15/20 2040)   Tdap (BOOSTRIX) injection 0.5 mL (0.5 mL Intramuscular Given 8/16/20 0037)       Progress and Consult Notes:  0000:  Patient care transferred from Dr. Carvajal pending CT scan and disposition    ED Course as of Aug 16 0751   Sun Aug 16, 2020   0100 I discussed the patient CT pelvis with Dr. Rivero, radiologist who states there is no acute fracture.  There is thickening of the left psoas muscle that extends into the inguinal area consistent with hematoma.    [KA]   0120 I rechecked the patient.  I discussed all lab and imaging results including the left psoas hematoma.  Will attempt to ambulate  the patient with a walker, if she is able to do so she can be discharged.  If not, will seek admission for pain control, PT OT evaluation and placement.    [KA]   0204 The patient was ambulated a short distance with great difficulty and severe pain, page out to [facility] for admission.    [KA]   0234 I discussed the patient with JUNE Glez for [facility] who agrees to admit for observation to [facility].    [KA]   0750 Medical chart reviewed.  Urgent care visit on 8/15/2019 for left hip pain.  Patient's hip hurt before falling and then her leg gave out and she fell and was having difficulty getting up or walking.  No test were performed and she was referred to the ER for further evaluation.    [KA]   0751 My interpretation of the left hip x-ray is no acute fracture.    [KA]      ED Course User Index  [KA] Patsy Menchaca PA     .    Diagnosis:  Final diagnoses:   Fall, initial encounter   Contusion of hip and thigh, left, initial encounter   Essential hypertension   Lumbar muscle hematoma, initial encounter       Provider attestation:  I personally reviewed the past medical history, past surgical history, social history, family history, current medications, and allergies as they appear in the chart.    The patient was seen and examined by myself and Dr. Carvajal, who agree with plan.          Patsy Menchaca PA  08/16/20 0756

## 2020-08-17 LAB
REF LAB TEST METHOD: NORMAL
SARS-COV-2 RNA RESP QL NAA+PROBE: NOT DETECTED

## 2020-08-17 PROCEDURE — 96361 HYDRATE IV INFUSION ADD-ON: CPT

## 2020-08-17 PROCEDURE — 97166 OT EVAL MOD COMPLEX 45 MIN: CPT

## 2020-08-17 PROCEDURE — 94799 UNLISTED PULMONARY SVC/PX: CPT

## 2020-08-17 PROCEDURE — 63710000001 ONDANSETRON PER 8 MG: Performed by: NURSE PRACTITIONER

## 2020-08-17 PROCEDURE — 97530 THERAPEUTIC ACTIVITIES: CPT

## 2020-08-17 PROCEDURE — 97535 SELF CARE MNGMENT TRAINING: CPT

## 2020-08-17 PROCEDURE — 25010000002 ONDANSETRON PER 1 MG: Performed by: NURSE PRACTITIONER

## 2020-08-17 PROCEDURE — G0378 HOSPITAL OBSERVATION PER HR: HCPCS

## 2020-08-17 PROCEDURE — 96376 TX/PRO/DX INJ SAME DRUG ADON: CPT

## 2020-08-17 RX ORDER — FAMOTIDINE 20 MG/1
20 TABLET, FILM COATED ORAL DAILY
Status: DISCONTINUED | OUTPATIENT
Start: 2020-08-18 | End: 2020-08-18 | Stop reason: HOSPADM

## 2020-08-17 RX ADMIN — LEVOTHYROXINE SODIUM 100 MCG: 100 TABLET ORAL at 05:38

## 2020-08-17 RX ADMIN — HYDROCODONE BITARTRATE AND ACETAMINOPHEN 1 TABLET: 5; 325 TABLET ORAL at 16:12

## 2020-08-17 RX ADMIN — LISINOPRIL 20 MG: 20 TABLET ORAL at 09:04

## 2020-08-17 RX ADMIN — SODIUM CHLORIDE, PRESERVATIVE FREE 10 ML: 5 INJECTION INTRAVENOUS at 20:36

## 2020-08-17 RX ADMIN — METOPROLOL TARTRATE 75 MG: 25 TABLET, FILM COATED ORAL at 09:04

## 2020-08-17 RX ADMIN — ONDANSETRON HYDROCHLORIDE 4 MG: 4 TABLET, FILM COATED ORAL at 22:14

## 2020-08-17 RX ADMIN — SODIUM CHLORIDE 75 ML/HR: 9 INJECTION, SOLUTION INTRAVENOUS at 05:38

## 2020-08-17 RX ADMIN — HYDROCODONE BITARTRATE AND ACETAMINOPHEN 1 TABLET: 5; 325 TABLET ORAL at 02:57

## 2020-08-17 RX ADMIN — FAMOTIDINE 20 MG: 20 TABLET, FILM COATED ORAL at 05:38

## 2020-08-17 RX ADMIN — ONDANSETRON HYDROCHLORIDE 4 MG: 4 TABLET, FILM COATED ORAL at 16:12

## 2020-08-17 RX ADMIN — ATORVASTATIN CALCIUM 10 MG: 20 TABLET, FILM COATED ORAL at 09:04

## 2020-08-17 RX ADMIN — HYDROCODONE BITARTRATE AND ACETAMINOPHEN 1 TABLET: 5; 325 TABLET ORAL at 09:04

## 2020-08-17 RX ADMIN — HYDROCODONE BITARTRATE AND ACETAMINOPHEN 1 TABLET: 5; 325 TABLET ORAL at 22:14

## 2020-08-17 RX ADMIN — LISINOPRIL 20 MG: 20 TABLET ORAL at 20:36

## 2020-08-17 RX ADMIN — AMLODIPINE BESYLATE 2.5 MG: 2.5 TABLET ORAL at 09:04

## 2020-08-17 RX ADMIN — METOPROLOL TARTRATE 75 MG: 25 TABLET, FILM COATED ORAL at 20:36

## 2020-08-17 RX ADMIN — ONDANSETRON 4 MG: 2 INJECTION INTRAMUSCULAR; INTRAVENOUS at 02:57

## 2020-08-17 RX ADMIN — ONDANSETRON HYDROCHLORIDE 4 MG: 4 TABLET, FILM COATED ORAL at 09:04

## 2020-08-17 RX ADMIN — SODIUM CHLORIDE, PRESERVATIVE FREE 10 ML: 5 INJECTION INTRAVENOUS at 09:04

## 2020-08-17 NOTE — PLAN OF CARE
"  Problem: Patient Care Overview  Goal: Plan of Care Review  Flowsheets  Taken 8/17/2020 1638  Plan of Care Reviewed With: patient  Taken 8/17/2020 1639  Outcome Summary: Pt. continues to have significant pain impacting all mobility. Required support when lowering legs from recliner due to pain. Able to ambulate 50' with CGAx1 and rwx; further mabulation declined due to pain. Pt. \"sliding\" L LE across floor as flexing hip makes symptoms worse. Pt. will benefit from rehab placement at D/C due to pain and inability to safely ambulate independently at this time - pt. requesting [facility] at D/C. Will continue to monitor.   Patient was wearing a face mask during this therapy encounter. Therapist used appropriate personal protective equipment including eye protection, mask, and gloves.  Mask used was standard procedure mask. Appropriate PPE was worn during the entire therapy session. Hand hygiene was completed before and after therapy session. Patient is not in enhanced droplet precautions.     "

## 2020-08-17 NOTE — PROGRESS NOTES
Name: Sylvia Stover ADMIT: 8/15/2020   : 1940  PCP: Rhoda Luna APRN    MRN: [id number] LOS: 0 days   AGE/SEX: 80 y.o. female  ROOM: [facility] [place]/     Subjective   Subjective   Resting in chair. About to get cleaned up. Tolerating a diet, but doesn't like the food. BM yesterday. No nausea or vomiting. Still with left leg pain worse with raising. Pain medication helping. No cough/fever/chills/dyspnea/chest pain.     Objective   Objective   Vital Signs  Temp:  [96.9 °F (36.1 °C)-98 °F (36.7 °C)] 97.5 °F (36.4 °C)  Heart Rate:  [51-81] 56  Resp:  [16-18] 16  BP: ()/(60-79) 100/60  SpO2:  [96 %-100 %] 97 %  on   ;   Device (Oxygen Therapy): room air  Body mass index is 27.4 kg/m².     Physical Exam   Constitutional: She is oriented to person, place, and time. She appears well-developed and well-nourished. No distress.   HENT:   Head: Normocephalic and atraumatic.   Nose: Nose normal.   Mouth/Throat: Oropharynx is clear and moist.   Eyes: Conjunctivae are normal. Right eye exhibits no discharge. Left eye exhibits no discharge.   Neck: Normal range of motion. Neck supple.   Cardiovascular: Normal rate, regular rhythm, normal heart sounds and intact distal pulses.   Pulmonary/Chest: Effort normal and breath sounds normal. No respiratory distress.   Abdominal: Soft. Bowel sounds are normal. She exhibits no distension. There is no tenderness.   Musculoskeletal: Normal range of motion. She exhibits edema (mild LLE > RLE).   Neurological: She is alert and oriented to person, place, and time. She exhibits normal muscle tone. Coordination normal.   Skin: Skin is warm and dry. She is not diaphoretic. No erythema.   Psychiatric: She has a normal mood and affect. Her behavior is normal.   Nursing note and vitals reviewed.     Results Review:       I reviewed the patient's new clinical results.  Results from last 7 days   Lab Units 20  0436 08/15/20  2224   WBC 10*3/mm3 7.77 13.27*   HEMOGLOBIN g/dL 11.4* 12.3     PLATELETS 10*3/mm3 197 257     Results from last 7 days   Lab Units 08/16/20  0436 08/15/20  2224   SODIUM mmol/L 134* 134*   POTASSIUM mmol/L 4.2 4.6   CHLORIDE mmol/L 98 97*   CO2 mmol/L 23.3 23.7   BUN mg/dL 19 20   CREATININE mg/dL 1.10* 1.11*   GLUCOSE mg/dL 111* 113*   Estimated Creatinine Clearance: 36 mL/min (A) (by C-G formula based on SCr of 1.1 mg/dL (H)).  Results from last 7 days   Lab Units 08/15/20  2224   ALBUMIN g/dL 4.40   BILIRUBIN mg/dL 0.5   ALK PHOS U/L 49   AST (SGOT) U/L 18   ALT (SGPT) U/L 21     Results from last 7 days   Lab Units 08/16/20  0436 08/15/20  2224   CALCIUM mg/dL 9.4 10.0   ALBUMIN g/dL  --  4.40       No results found for: HGBA1C, POCGLU      amLODIPine 2.5 mg Oral Daily   atorvastatin 10 mg Oral Daily   budesonide 0.5 mg Nebulization BID - RT   [START ON 8/18/2020] famotidine 20 mg Oral Daily   levothyroxine 100 mcg Oral Q AM   lisinopril 20 mg Oral BID   metoprolol tartrate 75 mg Oral Q12H   sodium chloride 10 mL Intravenous Q12H       sodium chloride 125 mL/hr Last Rate: Stopped (08/16/20 0758)   sodium chloride 75 mL/hr Last Rate: Stopped (08/17/20 1415)   Diet Regular       Assessment/Plan     Active Hospital Problems    Diagnosis  POA   • **Fall [W19.XXXA]  Yes   • Hypothyroidism [E03.9]  Unknown   • GERD (gastroesophageal reflux disease) [K21.9]  Unknown   • Osteoporosis [M81.0]  Unknown   • Essential hypertension [I10]  Yes   • Hyperlipidemia [E78.5]  Yes   • Stage 3 chronic kidney disease (CMS/HCC) [N18.3]  Yes      Resolved Hospital Problems   No resolved problems to display.     Fall  -Mild hemorrhage of left psoas muscle on CT.  She is on Aspirin, but no other anticoagulant. Continue to hold ASA (prophylactic) for now. Resume in next few days if stable.  -Pain medications as needed.  -PT/OT consulted. Notes suggest home with HH if able to do stairs. Patient already working on SNF for short-stay rehab for strengthening.  -Mild leukocytosis  resolved.     Hypertension  -Blood pressures stable. Continue home regimen with BB, Norvasc, lisinopril.  -Renal function okay.     Chronic Kidney Disease Stage 3  -Creat 1.10 and near baseline.  -Stop IVF as she is eating/drinking.   -Repeat labs in AM.     Hyperlipidemia  -Hold Aspirin as previously discussed. Continue statin.     Hypothyroidism  -Continue Levothyroxine.     GERD/Chronic Gastritis  -Continue Pepcid.     I discussed the patients findings and my recommendations with patient and nursing staff.     VTE Prophylaxis - SCDs.  Code Status - Full code.   Disposition - Anticipate discharge tomorrow to SNF (if ready) versus home with HH.      JUNE Delgado  [facility]  08/17/20  14:23     Full PPE including gloves, eyewear and mask worn during patient interaction. Hand hygiene performed prior to and after care.

## 2020-08-17 NOTE — PROGRESS NOTES
Discharge Planning Assessment  [facility]     Patient Name: Sylvia Stover  MRN: [id number]  Today's Date: 8/17/2020    Admit Date: 8/15/2020    Discharge Needs Assessment     Row Name 08/17/20 1205       Living Environment    Lives With  alone    Current Living Arrangements  home/apartment/condo    Quality of Family Relationships  involved       Transition Planning    Transportation Anticipated  family or friend will provide       Discharge Needs Assessment    Readmission Within the Last 30 Days  no previous admission in last 30 days    Concerns to be Addressed  adjustment to diagnosis/illness    Equipment Currently Used at Home  none    Discharge Facility/Level of Care Needs  home with home health;rehabilitation facility;nursing facility, skilled        Discharge Plan     Row Name 08/17/20 1207       Plan    Plan  [facility]     Provided Post Acute Provider List?  N/A    N/A Provider List Comment  referral to [facility] per pt's request     Patient/Family in Agreement with Plan  yes    Plan Comments  Facesheet and dc plan verified w/ pt at the bedside. Pt lives alone, she would like to dc to short-term rehab before returning home. Her first choice is [facility]; [facility] referral sent to [facility]. [facility] to eval and follow up.         Destination      Service Provider Request Status Selected Services Address Phone Number Fax Number    [facility] - Request Sent N/A [address] [id number] [phone]      Durable Medical Equipment      Coordination has not been started for this encounter.      Dialysis/Infusion      Coordination has not been started for this encounter.      Home Medical Care      Coordination has not been started for this encounter.      Therapy      Coordination has not been started for this encounter.      Community Resources      Coordination has not been started for this encounter.          Demographic Summary    No documentation.        Functional Status    No documentation.       Psychosocial    No documentation.       Abuse/Neglect    No documentation.       Legal    No documentation.       Substance Abuse    No documentation.       Patient Forms    No documentation.           Antonia Jaiml RN

## 2020-08-17 NOTE — PLAN OF CARE
Problem: Patient Care Overview  Goal: Plan of Care Review  Outcome: Ongoing (interventions implemented as appropriate)  Flowsheets (Taken 8/17/2020 0529)  Progress: improving  Plan of Care Reviewed With: patient  Outcome Summary: VSS, up with assist of 1, voiding per BRP, RA, educated on BP monitoring, home VS rehab on DC

## 2020-08-17 NOTE — THERAPY EVALUATION
Acute Care - Occupational Therapy Initial Evaluation  [facility]     Patient Name: Sylvia Stover  : 1940  MRN: [id number]  Today's Date: 2020             Admit Date: 8/15/2020       ICD-10-CM ICD-9-CM   1. Fall, initial encounter W19.XXXA E888.9   2. Contusion of hip and thigh, left, initial encounter S70.02XA 924.00    S70.12XA 924.01   3. Essential hypertension I10 401.9   4. Lumbar muscle hematoma, initial encounter S30.0XXA 922.31     Patient Active Problem List   Diagnosis   • Postoperative hypothyroidism   • Essential hypertension   • Hyperlipidemia   • Stage 3 chronic kidney disease (CMS/HCC)   • Osteoarthritis of right knee   • Osteoporosis, senile   • Fall   • Hypothyroidism   • GERD (gastroesophageal reflux disease)   • Osteoporosis     Past Medical History:   Diagnosis Date   • Age related osteoporosis    • Anemia    • Chronic gastritis    • Chronic renal insufficiency    • Gastric ulcer    • GERD (gastroesophageal reflux disease)    • Greater trochanteric bursitis    • H/O bone density study 2012    Osteopenia   • H/O mammogram 10/28/2015    stable   • Hyperlipidemia    • Hypertension    • Hypothyroidism    • Lumbago    • Osteoarthrosis    • PONV (postoperative nausea and vomiting)      Past Surgical History:   Procedure Laterality Date   • COLONOSCOPY  2006   • COLONOSCOPY N/A 2016    Procedure: COLONOSCOPY into cecum/terminal ileum;  Surgeon: Huy Sam MD;  Location: [facility] ENDOSCOPY;  Service:    • ENDOSCOPY N/A 2016    Procedure: ESOPHAGOGASTRODUODENOSCOPY with biopsy;  Surgeon: Huy Sam MD;  Location: [facility] ENDOSCOPY;  Service:    • ENDOSCOPY N/A 2016    Procedure: ESOPHAGOGASTRODUODENOSCOPY WITH BIOPSIES (COLD);  Surgeon: Huy Sam MD;  Location: [facility] ENDOSCOPY;  Service:    • FOOT SURGERY     • KNEE SURGERY     • PAP SMEAR  2013   • TONSILLECTOMY     • TOTAL THYROIDECTOMY     • UPPER GASTROINTESTINAL ENDOSCOPY  2014    Janene  Gonzalo Grade IV reflux esophagitis, gastric ulcer w/clean base, chronic gastritis, no h-pylori          OT ASSESSMENT FLOWSHEET (last 12 hours)      Occupational Therapy Evaluation     Row Name 08/17/20 1421                   OT Evaluation Time/Intention    Subjective Information  no complaints  -RB        Document Type  evaluation  -RB        Mode of Treatment  individual therapy;occupational therapy  -RB        Patient Effort  good  -RB        Symptoms Noted During/After Treatment  fatigue  -RB           General Information    Patient Profile Reviewed?  yes  -RB        Patient Observations  alert;cooperative;agree to therapy  -RB        Prior Level of Function  independent:;ADL's;transfer  -RB        Existing Precautions/Restrictions  fall  -RB        Barriers to Rehab  none identified  -RB           Relationship/Environment    Lives With  alone  -RB        Family Caregiver if Needed  -- Neighbors to assist PRN  -RB           Resource/Environmental Concerns    Current Living Arrangements  home/apartment/condo  -RB        Resource/Environmental Concerns  none  -RB        Transportation Concerns  car, none  -RB           Home Main Entrance    Number of Stairs, Main Entrance  ten  -RB           Cognitive Assessment/Intervention- PT/OT    Orientation Status (Cognition)  oriented x 3  -RB           Safety Issues, Functional Mobility    Safety Issues Affecting Function (Mobility)  positioning of assistive device;problem solving;safety precaution awareness;safety precautions follow-through/compliance;steps too close to assistive device;insight into deficits/self awareness  -RB        Impairments Affecting Function (Mobility)  endurance/activity tolerance;pain;range of motion (ROM);strength  -RB           Mobility Assessment/Treatment    Extremity Weight-bearing Status  left lower extremity  -RB        Left Lower Extremity (Weight-bearing Status)  weight-bearing as tolerated (WBAT)  -RB           Bed Mobility  Assessment/Treatment    Bed Mobility Assessment/Treatment  bed mobility (all) activities  -RB        Supine-Sit [place] (Bed Mobility)  minimum assist (75% patient effort)  -RB        Assistive Device (Bed Mobility)  bed rails;head of bed elevated  -RB           Functional Mobility    Functional Mobility- Ind. Level  contact guard assist  -RB        Functional Mobility- Device  rolling walker  -RB        Functional Mobility- Comment  Off loading LLE as a pain response. Slow pacing. Cues for walker positioning.  -RB           Transfer Assessment/Treatment    Transfer Assessment/Treatment  toilet transfer;chair-bed transfer  -RB           Bed-Chair Transfer    Bed-Chair [place] (Transfers)  contact guard  -RB        Assistive Device (Bed-Chair Transfers)  walker, front-wheeled  -RB           Chair-Bed Transfer    Chair-Bed [place] (Transfers)  contact guard  -RB        Assistive Device (Chair-Bed Transfers)  walker, front-wheeled  -RB           Sit-Stand Transfer    Sit-Stand [place] (Transfers)  contact guard  -RB        Assistive Device (Sit-Stand Transfers)  walker, front-wheeled  -RB           Toilet Transfer    Type (Toilet Transfer)  sit-stand;stand-sit  -RB        [place] Level (Toilet Transfer)  minimum assist (75% patient effort)  -RB        Assistive Device (Toilet Transfer)  grab bars/safety frame  -RB           ADL Assessment/Intervention    BADL Assessment/Intervention  toileting;grooming  -RB           Grooming Assessment/Training    [place] Level (Grooming)  set up  -RB        Grooming Position  supported standing  -RB        Comment (Grooming)  [place]  -RB           Toileting Assessment/Training    [place] Level (Toileting)  toileting skills  -RB        Assistive Devices (Toileting)  grab bar/safety frame  -RB        Toileting Position  supported sitting;supported standing  -RB           BADL Safety/Performance    Skilled BADL Treatment/Intervention  BADL  process/adaptation training;adaptive equipment training;cognitive/safety deficit modifications;energy conservation;environmental modifications  -RB        Progress in BADL Status  level of supervision required  -RB           General ROM    GENERAL ROM COMMENTS  BUE WFL - LLE impaired proximal ROM  -RB           MMT (Manual Muscle Testing)    General MMT Comments  BUE WFL - generalized weakness for age of pt. Grossly 3+/5  -RB           Positioning and Restraints    Pre-Treatment Position  sitting in chair/recliner  -RB        Post Treatment Position  chair  -RB        In Chair  notified nsg;reclined;call light within reach;encouraged to call for assist;exit alarm on  -RB           Pain Assessment    Additional Documentation  Pain Scale: Numbers Pre/Post-Treatment (Group)  -RB           Pain Scale: Numbers Pre/Post-Treatment    Pain Scale: Numbers, Pretreatment  5/10  -RB        Pain Scale: Numbers, Post-Treatment  5/10  -RB        Pain Location - Orientation  anterior  -RB        Pain Location  hip  -RB        Pain Intervention(s)  Rest;Repositioned  -RB           Coping    Observed Emotional State  accepting;calm;cooperative  -RB        Verbalized Emotional State  acceptance  -RB           Plan of Care Review    Plan of Care Reviewed With  patient  -RB        Progress  improving  -RB           Clinical Impression (OT)    Criteria for Skilled Therapeutic Interventions Met (OT Eval)  yes;treatment indicated  -RB        Rehab Potential (OT Eval)  good, to achieve stated therapy goals  -RB        Therapy Frequency (OT Eval)  5 times/wk  -RB        Care Plan Review (OT)  patient/other agree to care plan  -RB        Anticipated Discharge Disposition (OT)  skilled nursing facility  -RB           Vital Signs    O2 Delivery Pre Treatment  room air  -RB        O2 Delivery Intra Treatment  room air  -RB        O2 Delivery Post Treatment  room air  -RB           Planned OT Interventions    Planned Therapy Interventions (OT  Eval)  activity tolerance training;adaptive equipment training;BADL retraining;functional balance retraining;occupation/activity based interventions;transfer/mobility retraining;strengthening exercise;patient/caregiver education/training  -RB           OT Goals    Bed Mobility Goal Selection (OT)  bed mobility, OT goal 1  -RB        Transfer Goal Selection (OT)  transfer, OT goal 1  -RB        Bathing Goal Selection (OT)  bathing, OT goal 1  -RB        Dressing Goal Selection (OT)  dressing, OT goal 1  -RB        Toileting Goal Selection (OT)  toileting, OT goal 1  -RB        Grooming Goal Selection (OT)  grooming, OT goal 1  -RB        Additional Documentation  Grooming Goal Selection (OT) (Row)  -RB           Bed Mobility Goal 1 (OT)    Activity/Assistive Device (Bed Mobility Goal 1, OT)  bed mobility activities, all  -RB        [place] Level/Cues Needed (Bed Mobility Goal 1, OT)  conditional independence  -RB        Time Frame (Bed Mobility Goal 1, OT)  short term goal (STG);2 weeks  -RB        Progress/Outcomes (Bed Mobility Goal 1, OT)  continuing progress toward goal  -RB           Transfer Goal 1 (OT)    Activity/Assistive Device (Transfer Goal 1, OT)  transfers, all  -RB        [place] Level/Cues Needed (Transfer Goal 1, OT)  conditional independence  -RB        Time Frame (Transfer Goal 1, OT)  short term goal (STG);2 weeks  -RB        Progress/Outcome (Transfer Goal 1, OT)  continuing progress toward goal  -RB           Bathing Goal 1 (OT)    Activity/Assistive Device (Bathing Goal 1, OT)  bathing skills, all  -RB        [place] Level/Cues Needed (Bathing Goal 1, OT)  conditional independence  -RB        Time Frame (Bathing Goal 1, OT)  short term goal (STG);2 weeks  -RB        Progress/Outcomes (Bathing Goal 1, OT)  continuing progress toward goal  -RB           Dressing Goal 1 (OT)    Activity/Assistive Device (Dressing Goal 1, OT)  dressing skills, all  -RB        [place]/Cues Needed  (Dressing Goal 1, OT)  conditional independence  -RB        Time Frame (Dressing Goal 1, OT)  short term goal (STG);2 weeks  -RB        Progress/Outcome (Dressing Goal 1, OT)  continuing progress toward goal  -RB           Toileting Goal 1 (OT)    Activity/Device (Toileting Goal 1, OT)  toileting skills, all  -RB        [place] Level/Cues Needed (Toileting Goal 1, OT)  conditional independence  -RB        Time Frame (Toileting Goal 1, OT)  short term goal (STG);2 weeks  -RB        Progress/Outcome (Toileting Goal 1, OT)  continuing progress toward goal  -RB           Grooming Goal 1 (OT)    Activity/Device (Grooming Goal 1, OT)  grooming skills, all  -RB        [place] (Grooming Goal 1, OT)  conditional independence  -RB        Time Frame (Grooming Goal 1, OT)  short term goal (STG);2 weeks  -RB        Progress/Outcome (Grooming Goal 1, OT)  continuing progress toward goal  -RB          User Key  (r) = Recorded By, (t) = Taken By, (c) = Cosigned By    Initials Name Effective Dates    RB Rosangela Smalls, CALE 04/02/20 -          Occupational Therapy Education                 Title: PT OT SLP Therapies (In Progress)     Topic: Occupational Therapy (Not Started)     Point: ADL training (Not Started)     Description:   Instruct learner(s) on proper safety adaptation and remediation techniques during self care or transfers.   Instruct in proper use of assistive devices.              Learner Progress:   Not documented in this visit.          Point: Home exercise program (Not Started)     Description:   Instruct learner(s) on appropriate technique for monitoring, assisting and/or progressing therapeutic exercises/activities.              Learner Progress:   Not documented in this visit.          Point: Precautions (Not Started)     Description:   Instruct learner(s) on prescribed precautions during self-care and functional transfers.              Learner Progress:   Not documented in this visit.          Point:  Body mechanics (Not Started)     Description:   Instruct learner(s) on proper positioning and spine alignment during self-care, functional mobility activities and/or exercises.              Learner Progress:   Not documented in this visit.                              OT Recommendation and Plan  Outcome Summary/Treatment Plan (OT)  Anticipated Discharge Disposition (OT): skilled nursing facility  Planned Therapy Interventions (OT Eval): activity tolerance training, adaptive equipment training, BADL retraining, functional balance retraining, occupation/activity based interventions, transfer/mobility retraining, strengthening exercise, patient/caregiver education/training  Therapy Frequency (OT Eval): 5 times/wk  Plan of Care Review  Plan of Care Reviewed With: patient  Plan of Care Reviewed With: patient    Outcome Measures     Row Name 08/17/20 1500             How much help from another is currently needed...    Putting on and taking off regular lower body clothing?  2  -RB      Bathing (including washing, rinsing, and drying)  2  -RB      Toileting (which includes using toilet bed pan or urinal)  3  -RB      Putting on and taking off regular upper body clothing  3  -RB      Taking care of personal grooming (such as brushing teeth)  3  -RB      Eating meals  3  -RB      AM-PAC 6 Clicks Score (OT)  16  -RB         Functional Assessment    Outcome Measure Options  AM-PAC 6 Clicks Daily Activity (OT)  -RB        User Key  (r) = Recorded By, (t) = Taken By, (c) = Cosigned By    Initials Name Provider Type    Rosangela Barahona OT Occupational Therapist          Time Calculation:   Time Calculation- OT     Row Name 08/17/20 1533             Time Calculation- OT    OT Start Time  1501  -RB      OT Stop Time  1527  -RB      OT Time Calculation (min)  26 min  -RB      Total Timed Code Minutes- OT  15 minute(s)  -RB      OT Received On  08/17/20  -RB      OT - Next Appointment  08/18/20  -RB      OT Goal Re-Cert Due Date   08/31/20  -NENO        User Key  (r) = Recorded By, (t) = Taken By, (c) = Cosigned By    Initials Name Provider Type    Rosangela Barahona OT Occupational Therapist        Therapy Charges for Today     Code Description Service Date Service Provider Modifiers Qty    [id number]  OT EVAL MOD COMPLEXITY 2 8/17/2020 Rosangela Smalls OT GO 1    [id number]  OT SELF CARE/MGMT/TRAIN EA 15 MIN 8/17/2020 Rosangela Smlals OT GO 1               Rosangela Smalls OT  8/17/2020

## 2020-08-17 NOTE — THERAPY TREATMENT NOTE
Patient Name: Sylvia Stover  : 1940    MRN: [id number]                              Today's Date: 2020       Admit Date: 8/15/2020    Visit Dx:     ICD-10-CM ICD-9-CM   1. Fall, initial encounter W19.XXXA E888.9   2. Contusion of hip and thigh, left, initial encounter S70.02XA 924.00    S70.12XA 924.01   3. Essential hypertension I10 401.9   4. Lumbar muscle hematoma, initial encounter S30.0XXA 922.31     Patient Active Problem List   Diagnosis   • Postoperative hypothyroidism   • Essential hypertension   • Hyperlipidemia   • Stage 3 chronic kidney disease (CMS/HCC)   • Osteoarthritis of right knee   • Osteoporosis, senile   • Fall   • Hypothyroidism   • GERD (gastroesophageal reflux disease)   • Osteoporosis     Past Medical History:   Diagnosis Date   • Age related osteoporosis    • Anemia    • Chronic gastritis    • Chronic renal insufficiency    • Gastric ulcer    • GERD (gastroesophageal reflux disease)    • Greater trochanteric bursitis    • H/O bone density study 2012    Osteopenia   • H/O mammogram 10/28/2015    stable   • Hyperlipidemia    • Hypertension    • Hypothyroidism    • Lumbago    • Osteoarthrosis    • PONV (postoperative nausea and vomiting)      Past Surgical History:   Procedure Laterality Date   • COLONOSCOPY  2006   • COLONOSCOPY N/A 2016    Procedure: COLONOSCOPY into cecum/terminal ileum;  Surgeon: Huy Sam MD;  Location: [facility] ENDOSCOPY;  Service:    • ENDOSCOPY N/A 2016    Procedure: ESOPHAGOGASTRODUODENOSCOPY with biopsy;  Surgeon: Huy Sam MD;  Location: [facility] ENDOSCOPY;  Service:    • ENDOSCOPY N/A 2016    Procedure: ESOPHAGOGASTRODUODENOSCOPY WITH BIOPSIES (COLD);  Surgeon: Huy Sam MD;  Location: [facility] ENDOSCOPY;  Service:    • FOOT SURGERY     • KNEE SURGERY     • PAP SMEAR  2013   • TONSILLECTOMY     • TOTAL THYROIDECTOMY     • UPPER GASTROINTESTINAL ENDOSCOPY  2014    Janene Sánchez Grade IV reflux esophagitis,  "gastric ulcer w/clean base, chronic gastritis, no h-pylori     General Information     Row Name 08/17/20 1634          PT Evaluation Time/Intention    Document Type  therapy note (daily note)  -     Mode of Treatment  physical therapy;individual therapy  -     Row Name 08/17/20 1634          General Information    Existing Precautions/Restrictions  fall  -     Row Name 08/17/20 1634          Cognitive Assessment/Intervention- PT/OT    Orientation Status (Cognition)  oriented x 3  -       User Key  (r) = Recorded By, (t) = Taken By, (c) = Cosigned By    Initials Name Provider Type     Thea Santamaria, PT Physical Therapist        Mobility     Row Name 08/17/20 1635          Bed Mobility Assessment/Treatment    Comment (Bed Mobility)  Up in chair at PT arrival  -     Row Name 08/17/20 1635          Sit-Stand Transfer    Sit-Stand [place] (Transfers)  contact guard  -     Assistive Device (Sit-Stand Transfers)  walker, front-wheeled  -[facility] Name 08/17/20 1635          Gait/Stairs Assessment/Training    Gait/Stairs Assessment/Training  gait/ambulation assistive device  -     [place] Level (Gait)  verbal cues;nonverbal cues (demo/gesture);contact guard  -     Assistive Device (Gait)  walker, front-wheeled  -     Distance in Feet (Gait)  50'  -     Deviations/Abnormal Patterns (Gait)  gait speed decreased;antalgic;left sided deviations  -     Bilateral Gait Deviations  forward flexed posture;hip hiking;heel strike decreased  -     Left Sided Gait Deviations  heel strike decreased  -     Comment (Gait/Stairs)  \"Sliding\" L LE during gait due to pain  -       User Key  (r) = Recorded By, (t) = Taken By, (c) = Cosigned By    Initials Name Provider Type     Thea Santamaria, PT Physical Therapist        Obj/Interventions    No documentation.       Goals/Plan    No documentation.       Clinical Impression     Row Name 08/17/20 1635          Pain Scale: Numbers Pre/Post-Treatment    " "Pain Intervention(s)  Repositioned  -     Row Name 08/17/20 0555          Pain Scale: FACES Pre/Post-Treatment    Pain: FACES Scale, Pretreatment  6-->hurts even more  -     Pain: FACES Scale, Post-Treatment  6-->hurts even more  -     Row Name 08/17/20 6465          Plan of Care Review    Outcome Summary  Pt. continues to have significant pain impacting all mobility. Required support when lowering legs from recliner due to pain. Able to ambulate 50' with CGAx1 and rwx; further mabulation declined due to pain. Pt. \"sliding\" L LE across floor as flexing hip makes symptoms worse. Pt. will benefit from rehab placement at D/C due to pain and inability to safely ambulate independently at this time - pt. requesting [facility] at D/C. Will continue to monitor.  -     Row Name 08/17/20 1637          Positioning and Restraints    Pre-Treatment Position  sitting in chair/recliner  -     Post Treatment Position  chair  -     In Chair  reclined;sitting;call light within reach;encouraged to call for assist;exit alarm on  -       User Key  (r) = Recorded By, (t) = Taken By, (c) = Cosigned By    Initials Name Provider Type     Thea Santamaria, MARY Physical Therapist        Outcome Measures     Row Name 08/17/20 1372          How much help from another person do you currently need...    Turning from your back to your side while in flat bed without using bedrails?  3  -MH     Moving from lying on back to sitting on the side of a flat bed without bedrails?  3  -MH     Moving to and from a bed to a chair (including a wheelchair)?  3  -MH     Standing up from a chair using your arms (e.g., wheelchair, bedside chair)?  3  -MH     Climbing 3-5 steps with a railing?  2  -MH     To walk in hospital room?  3  -     AM-PAC 6 Clicks Score (PT)  17  -     Row Name 08/17/20 3578          Functional Assessment    Outcome Measure Options  AM-PAC 6 Clicks Basic Mobility (PT)  -       User Key  (r) = Recorded By, (t) = Taken " By, (c) = Cosigned By    Initials Name Provider Type    Thea Hernandez PT Physical Therapist        Physical Therapy Education                 Title: PT OT SLP Therapies (In Progress)     Topic: Physical Therapy (Done)     Point: Mobility training (Done)     Description:   Instruct learner(s) on safety and technique for assisting patient out of bed, chair or wheelchair.  Instruct in the proper use of assistive devices, such as walker, crutches, cane or brace.              Patient Friendly Description:   It's important to get you on your feet again, but we need to do so in a way that is safe for you. Falling has serious consequences, and your personal safety is the most important thing of all.        When it's time to get out of bed, one of us or a family member will sit next to you on the bed to give you support.     If your doctor or nurse tells you to use a walker, crutches, a cane, or a brace, be sure you use it every time you get out of bed, even if you think you don't need it.    Learning Progress Summary           Patient Acceptance, E,TB,D, VU,DU,NR by  at 8/17/2020 1638    Acceptance, E,TB, VU,NR by  at 8/16/2020 1454                   Point: Home exercise program (Done)     Description:   Instruct learner(s) on appropriate technique for monitoring, assisting and/or progressing patient with therapeutic exercises and activities.              Learning Progress Summary           Patient Acceptance, E,TB,D, VU,DU,NR by  at 8/17/2020 1638    Acceptance, E,TB, VU,NR by  at 8/16/2020 1454                   Point: Body mechanics (Done)     Description:   Instruct learner(s) on proper positioning and spine alignment for patient and/or caregiver during mobility tasks and/or exercises.              Learning Progress Summary           Patient Acceptance, E,TB,D, VU,DU,NR by  at 8/17/2020 1638    Acceptance, E,TB, VU,NR by  at 8/16/2020 1454                   Point: Precautions (Done)     Description:  "  Instruct learner(s) on prescribed precautions during mobility and gait tasks              Learning Progress Summary           Patient Acceptance, E,TB,D, VU,DU,NR by  at 8/17/2020 1638    Acceptance, E,TB, VU,NR by  at 8/16/2020 1454                               User Key     Initials Effective Dates Name Provider Type Discipline     05/14/18 -  Jan Oro, PT Physical Therapist PT     05/26/20 -  Thea Santamaria PT Physical Therapist PT              PT Recommendation and Plan     Plan of Care Reviewed With: patient  Outcome Summary: Pt. continues to have significant pain impacting all mobility. Required support when lowering legs from recliner due to pain. Able to ambulate 50' with CGAx1 and rwx; further mabulation declined due to pain. Pt. \"sliding\" L LE across floor as flexing hip makes symptoms worse. Pt. will benefit from rehab placement at D/C due to pain and inability to safely ambulate independently at this time - pt. requesting [facility] at D/C. Will continue to monitor.     Time Calculation:   PT Charges     Row Name 08/17/20 1639             Time Calculation    Start Time  1623  -      Stop Time  1633  -      Time Calculation (min)  10 min  -      PT Received On  08/17/20  -      PT - Next Appointment  08/18/20  -         Time Calculation- PT    Total Timed Code Minutes- PT  10 minute(s)  -        User Key  (r) = Recorded By, (t) = Taken By, (c) = Cosigned By    Initials Name Provider Type     Thea Santamaria, MARY Physical Therapist        Therapy Charges for Today     Code Description Service Date Service Provider Modifiers Qty    [id number]  PT THERAPEUTIC ACT EA 15 MIN 8/17/2020 Thea Santamaria PT GP 1          PT G-Codes  Outcome Measure Options: AM-PAC 6 Clicks Basic Mobility (PT)  AM-PAC 6 Clicks Score (PT): 17  AM-PAC 6 Clicks Score (OT): 16    Thea Santamaria PT  8/17/2020       "

## 2020-08-17 NOTE — PLAN OF CARE
A&Ox4. VSS. NVI. Pain controlled. Po zofran for nausea prophylaxis. COVID negative. Worked with OT/PT. Up with assist x1 and walker. BRP, voiding function intact. Plans to D/C to rehab, pending placement/precert. Will cont to monitor.

## 2020-08-17 NOTE — PLAN OF CARE
Problem: Patient Care Overview  Goal: Plan of Care Review  Note:   Pt presents to [facility] 2/2 to a fall resulting in a mild hemorrhage of left psoas muscle. Pt lives alone in a home with steps to manage both inside and outside. Pt previously independent with ADL/IADL's, uses a walker PRN. Pt with difficulty managing pain in LLE. Pt completed mobility in her hospital room with CGA. Pt completed standing sinkside ADL's with supervision. Pt completed toileting and lowering onto commode with Min A. Pt with slow pacing of mobility due to protective pain response. OT recommends continued skilled inpatient OT services with a d/c to SNF - pt requesting a short rehab stay at [place] to increase her independence.     Patient was wearing a face mask during this therapy encounter. Therapist used appropriate personal protective equipment including mask, goggles and gloves. Mask used was standard procedure mask. Appropriate PPE was worn during the entire therapy session. Hand hygiene was completed before and after therapy session. Patient is not in enhanced droplet precautions.

## 2020-08-17 NOTE — DISCHARGE PLACEMENT REQUEST
"Sylvia Alcocer (80 y.o. Female)     Date of Birth Social Security Number Address Home Phone MRN    1940   [place] [address] [phone] [id number]    Taoism Marital Status          None        Admission Date Admission Type Admitting Provider Attending Provider Department, Room/Bed    8/15/20 Emergency Jonathan Gutierrez MD Hayden, Juliana, MD [address], P884/1    Discharge Date Discharge Disposition Discharge Destination                       Attending Provider:  Remedios Salter MD    Allergies:  Codeine    Isolation:  None   Infection:  COVID Screen (preop/placement) (08/16/20)   Code Status:  CPR    Ht:  154.9 cm (61\")   Wt:  65.8 kg (145 lb)    Admission Cmt:  None   Principal Problem:  Fall [W19.XXXA]                 Active Insurance as of 8/15/2020     Primary Coverage     Payor Plan Insurance Group Employer/Plan Group    MEDICARE MEDICARE A & B      Payor Plan Address Payor Plan Phone Number Payor Plan Fax Number Effective Dates    [address] [phone]  6/1/2006 - None Entered    [facility] [id number]       Subscriber Name Subscriber Birth Date Member ID       SYLVIA ALCOCER 1940 [id number]           Secondary Coverage     Payor Plan Insurance Group Employer/Plan Group    [facility] [id number]     Payor Plan Address Payor Plan Phone Number Payor Plan Fax Number Effective Dates    [address] [address]   1/1/2016 - None Entered    [facility] [id number]       Subscriber Name Subscriber Birth Date Member ID       SYLVIA ALCOCER 1940 [id number]                 Emergency Contacts      (Rel.) Home Phone Work Phone Mobile Phone    Ramu Scales (Daughter) -- -- [phone]              "

## 2020-08-18 VITALS
DIASTOLIC BLOOD PRESSURE: 70 MMHG | HEART RATE: 67 BPM | OXYGEN SATURATION: 97 % | HEIGHT: 61 IN | RESPIRATION RATE: 16 BRPM | SYSTOLIC BLOOD PRESSURE: 118 MMHG | WEIGHT: 145 LBS | TEMPERATURE: 97.2 F | BODY MASS INDEX: 27.38 KG/M2

## 2020-08-18 PROBLEM — S30.1XXA HEMATOMA OF LEFT PSOAS REGION TO ANTICOAGULANT THERAPY: Status: ACTIVE | Noted: 2020-08-18

## 2020-08-18 LAB
ANION GAP SERPL CALCULATED.3IONS-SCNC: 8.6 MMOL/L (ref 5–15)
BUN SERPL-MCNC: 20 MG/DL (ref 8–23)
BUN/CREAT SERPL: 17.4 (ref 7–25)
CALCIUM SPEC-SCNC: 8.3 MG/DL (ref 8.6–10.5)
CHLORIDE SERPL-SCNC: 104 MMOL/L (ref 98–107)
CO2 SERPL-SCNC: 22.4 MMOL/L (ref 22–29)
CREAT SERPL-MCNC: 1.15 MG/DL (ref 0.57–1)
DEPRECATED RDW RBC AUTO: 42.2 FL (ref 37–54)
ERYTHROCYTE [DISTWIDTH] IN BLOOD BY AUTOMATED COUNT: 13 % (ref 12.3–15.4)
GFR SERPL CREATININE-BSD FRML MDRD: 46 ML/MIN/1.73
GLUCOSE SERPL-MCNC: 87 MG/DL (ref 65–99)
HCT VFR BLD AUTO: 28 % (ref 34–46.6)
HGB BLD-MCNC: 9.5 G/DL (ref 12–15.9)
MCH RBC QN AUTO: 30.8 PG (ref 26.6–33)
MCHC RBC AUTO-ENTMCNC: 33.9 G/DL (ref 31.5–35.7)
MCV RBC AUTO: 90.9 FL (ref 79–97)
PLATELET # BLD AUTO: 166 10*3/MM3 (ref 140–450)
PMV BLD AUTO: 9.2 FL (ref 6–12)
POTASSIUM SERPL-SCNC: 4.3 MMOL/L (ref 3.5–5.2)
RBC # BLD AUTO: 3.08 10*6/MM3 (ref 3.77–5.28)
SODIUM SERPL-SCNC: 135 MMOL/L (ref 136–145)
WBC # BLD AUTO: 5.22 10*3/MM3 (ref 3.4–10.8)

## 2020-08-18 PROCEDURE — 85027 COMPLETE CBC AUTOMATED: CPT | Performed by: NURSE PRACTITIONER

## 2020-08-18 PROCEDURE — 63710000001 ONDANSETRON PER 8 MG: Performed by: NURSE PRACTITIONER

## 2020-08-18 PROCEDURE — 80048 BASIC METABOLIC PNL TOTAL CA: CPT | Performed by: NURSE PRACTITIONER

## 2020-08-18 PROCEDURE — G0378 HOSPITAL OBSERVATION PER HR: HCPCS

## 2020-08-18 RX ORDER — HYDROCODONE BITARTRATE AND ACETAMINOPHEN 5; 325 MG/1; MG/1
1 TABLET ORAL EVERY 6 HOURS PRN
Qty: 12 TABLET | Refills: 0 | Status: SHIPPED | OUTPATIENT
Start: 2020-08-18 | End: 2021-04-12

## 2020-08-18 RX ADMIN — ONDANSETRON HYDROCHLORIDE 4 MG: 4 TABLET, FILM COATED ORAL at 06:41

## 2020-08-18 RX ADMIN — SODIUM CHLORIDE, PRESERVATIVE FREE 10 ML: 5 INJECTION INTRAVENOUS at 08:06

## 2020-08-18 RX ADMIN — HYDROCODONE BITARTRATE AND ACETAMINOPHEN 1 TABLET: 5; 325 TABLET ORAL at 15:24

## 2020-08-18 RX ADMIN — HYDROCODONE BITARTRATE AND ACETAMINOPHEN 1 TABLET: 5; 325 TABLET ORAL at 06:41

## 2020-08-18 RX ADMIN — ONDANSETRON HYDROCHLORIDE 4 MG: 4 TABLET, FILM COATED ORAL at 15:24

## 2020-08-18 RX ADMIN — AMLODIPINE BESYLATE 2.5 MG: 2.5 TABLET ORAL at 08:06

## 2020-08-18 RX ADMIN — FAMOTIDINE 20 MG: 20 TABLET, FILM COATED ORAL at 08:06

## 2020-08-18 RX ADMIN — BISACODYL 5 MG: 5 TABLET ORAL at 08:12

## 2020-08-18 RX ADMIN — LISINOPRIL 20 MG: 20 TABLET ORAL at 08:06

## 2020-08-18 RX ADMIN — ATORVASTATIN CALCIUM 10 MG: 20 TABLET, FILM COATED ORAL at 08:06

## 2020-08-18 RX ADMIN — LEVOTHYROXINE SODIUM 100 MCG: 100 TABLET ORAL at 06:41

## 2020-08-18 RX ADMIN — METOPROLOL TARTRATE 75 MG: 25 TABLET, FILM COATED ORAL at 08:06

## 2020-08-18 NOTE — DISCHARGE SUMMARY
[facility]    Date of Discharge:  8/18/2020    PCP: Rhoda Luna APRN    Discharge Diagnosis:   Active Hospital Problems    Diagnosis  POA   • **Fall [W19.XXXA]  Yes   • Hematoma of left psoas region to anticoagulant therapy [S30.1XXA]  Yes   • Hypothyroidism [E03.9]  Unknown   • GERD (gastroesophageal reflux disease) [K21.9]  Unknown   • Osteoporosis [M81.0]  Unknown   • Essential hypertension [I10]  Yes   • Hyperlipidemia [E78.5]  Yes   • Stage 3 chronic kidney disease (CMS/HCC) [N18.3]  Yes      Resolved Hospital Problems   No resolved problems to display.     Procedures Performed  none     Consults     Date and Time Order Name Status Description    8/16/2020 1003 Inpatient Orthopedic Surgery Consult Completed     8/16/2020 0203 [facility] (on-call MD unless specified) Details Completed         Hospital Course  Please see history and physical for details. Patient is a 80 y.o. female that initially presented to the hospital after a fall at home with subsequent left hip pain.  She had a CT scan of her pelvis on admission that showed no acute fracture, but a left psoas muscle hematoma. She was admitted to the hospital for observation and supportive care.   Her baby ASA was held on admission and Orthopedic surgery was consulted. She was allowed to weight bear as tolerated to her LLE and no acute orthopedic intervention was needed. PT was consulted and given she lives alone, will require a short-term rehab stay for strengthening. Her pain has been controlled with PRN Norco and she feels ready for discharge today.    She remained medically stable during the stay. Her renal function remained at her baseline with creatinine around 1.1 and electrolytes stable. She is eating/drinking and urinating on her own. She denies any nausea or vomiting and denies abdominal pain. Her BP has remained stable on her home regimen. Her hemoglobin was monitored and remained stable as well.  She should remain off her ASA for a few more days and recommend repeat CBC at that time. If lab work is stable, could resume ASA therapy at the discretion of her rehab facility as she takes this for prophylactic reasons. She needs to see her PCP in 1-2 weeks following discharge.    I discussed the patient's findings and my recommendations with patient, nursing staff and Dr. Salter.    Condition on Discharge: Improved.     Temp:  [97.5 °F (36.4 °C)-98.6 °F (37 °C)] 97.5 °F (36.4 °C)  Heart Rate:  [50-57] 55  Resp:  [16-18] 16  BP: (104-121)/(55-74) 121/73  Body mass index is 27.4 kg/m².    Physical Exam   Constitutional: She is oriented to person, place, and time. She appears well-developed and well-nourished. No distress.   HENT:   Head: Normocephalic and atraumatic.   Nose: Nose normal.   Mouth/Throat: Oropharynx is clear and moist.   Eyes: Conjunctivae are normal. Right eye exhibits no discharge. Left eye exhibits no discharge.   Neck: Normal range of motion. Neck supple.   Cardiovascular: Normal rate, regular rhythm, normal heart sounds and intact distal pulses.   Pulmonary/Chest: Effort normal and breath sounds normal. No respiratory distress.   Abdominal: Soft. Bowel sounds are normal. She exhibits no distension. There is no tenderness.   Musculoskeletal: Normal range of motion. She exhibits no edema or tenderness.   Neurological: She is alert and oriented to person, place, and time. She exhibits normal muscle tone. Coordination normal.   Skin: Skin is warm and dry. She is not diaphoretic. No erythema.   Psychiatric: She has a normal mood and affect. Her behavior is normal.   Nursing note and vitals reviewed.        Discharge Medications      New Medications      Instructions Start Date   HYDROcodone-acetaminophen 5-325 MG per tablet  Commonly known as:  NORCO   1 tablet, Oral, Every 6 Hours PRN      lidocaine 5 %  Commonly known as:  LIDODERM   1 patch, Transdermal, Every 24 Hours, Remove & Discard patch  within 12 hours or as directed by MD      ondansetron ODT 4 MG disintegrating tablet  Commonly known as:  Zofran ODT   4 mg, Translingual, Every 8 Hours PRN         Continue These Medications      Instructions Start Date   amLODIPine 2.5 MG tablet  Commonly known as:  NORVASC   2.5 mg, Oral, Daily      budesonide 90 MCG/ACT inhaler  Commonly known as:  PULMICORT   1 puff, Inhalation, 2 Times Daily PRN, Rinse and spit after use      Crisaborole 2 % ointment   1 Units, Apply externally, 2 Times Daily      denosumab 60 MG/ML solution syringe  Commonly known as:  PROLIA   Subcutaneous, Every 6 months       diclofenac 1 % gel gel  Commonly known as:  VOLTAREN   4 g, Topical, 4 Times Daily PRN      famotidine 20 MG tablet  Commonly known as:  PEPCID   20 mg, Oral, 2 Times Daily      ferrous sulfate 325 (65 FE) MG tablet   325 mg, Oral, Daily With Breakfast      levothyroxine 100 MCG tablet  Commonly known as:  SYNTHROID, LEVOTHROID   100 mcg, Oral, Daily      lisinopril 20 MG tablet  Commonly known as:  PRINIVIL,ZESTRIL   20 mg, Oral, 2 Times Daily      meclizine 12.5 MG tablet  Commonly known as:  ANTIVERT   12.5 mg, Oral, 3 Times Daily PRN, 1-2 tablets every 8 hours as needed      metoprolol tartrate 50 MG tablet  Commonly known as:  LOPRESSOR   Take 1.5 tablets by mouth two times daily      MULTIVITAMIN PO   Oral      simvastatin 20 MG tablet  Commonly known as:  ZOCOR   20 mg, Oral, Every Night at Bedtime      Vitamin D 50 MCG (2000 UT) capsule   1,000 Units, Oral         Stop These Medications    aspirin 81 MG tablet           Diet Instructions     Diet: Regular      Discharge Diet:  Regular         Activity Instructions     Activity as Tolerated           Additional Instructions for the Follow-ups that You Need to Schedule     Discharge Follow-up with PCP   As directed       Currently Documented PCP:    Rhoda Luna APRN    PCP Phone Number:    [phone]     Follow Up Details:  see in 1-2 weeks          Discharge Follow-up with Specified Provider: Ortho as needed   As directed      To:  Ortho as needed            Contact information for follow-up providers     Rhoda Luna APRN .    Specialty:  Internal Medicine  Why:  see in 1-2 weeks  Contact information:  [address] [address] [phone]                   Contact information for after-discharge care     [facility] .    Service:  Skilled Nursing  Contact information:  [address] [facility] [address] [phone]                           Test Results Pending at Discharge     JUNE Delgado  08/18/20  13:28    Discharge time spent greater than 30 minutes.

## 2020-08-18 NOTE — PLAN OF CARE
Problem: Patient Care Overview  Goal: Plan of Care Review  Outcome: Ongoing (interventions implemented as appropriate)  Flowsheets (Taken 8/18/2020 2020)  Progress: improving  Plan of Care Reviewed With: patient  Outcome Summary: VSS, pain tolerable, up with assist of 1, voiding per BRP, RA, SL, educated on BP monitoring, to [facility] for rehab on DC

## 2020-08-18 NOTE — PROGRESS NOTES
Continued Stay Note  [facility]     Patient Name: Sylvia Stover  MRN: [id number]  Today's Date: 8/18/2020    Admit Date: 8/15/2020    Discharge Plan     Row Name 08/18/20 1157       Plan    Plan  [facility]     Plan Comments  S/w Val, rehab bed available today at [facility]. [facility] notified.          Discharge Codes    No documentation.             Antonia Jamil RN

## 2020-08-28 ENCOUNTER — OFFICE VISIT (OUTPATIENT)
Dept: INTERNAL MEDICINE | Facility: CLINIC | Age: 80
End: 2020-08-28

## 2020-08-28 VITALS — BODY MASS INDEX: 27.4 KG/M2 | HEIGHT: 61 IN

## 2020-08-28 DIAGNOSIS — S30.1XXA HEMATOMA OF LEFT PSOAS REGION TO ANTICOAGULANT THERAPY, INITIAL ENCOUNTER: Primary | ICD-10-CM

## 2020-08-28 DIAGNOSIS — N18.30 STAGE 3 CHRONIC KIDNEY DISEASE (HCC): ICD-10-CM

## 2020-08-28 PROCEDURE — 99443 PR PHYS/QHP TELEPHONE EVALUATION 21-30 MIN: CPT | Performed by: NURSE PRACTITIONER

## 2020-08-28 NOTE — PROGRESS NOTES
"Subjective   Sylvia Stover is a 80 y.o. female who presents for f/u regarding recent hospitalization for hematoma of left psoas region after a fall and c/o restless legs.    You have chosen to receive care through a telephone visit. Do you consent to use a telephone visit for your medical care today? Yes    She fell at home August 18 and complained of left hip pain, presented to the ER due to leg pain and weakness.  She had a CT scan that did not show a hip fracture but a left psoas muscle hematoma.  She was then transferred to rehab for further strengthening. She has been home since Sunday and is receiving physical therapy.  She has been ambulating more throughout the health and is doing well.  She is no longer taking hydrocodone for pain.  She does report difficulty sleeping since returning home.  She also reports intermittent episodes of restless legs with difficulty sleeping due to sensation in her legs, states episodes occur \"once in blue moon.\"       The following portions of the patient's history were reviewed and updated as appropriate: allergies, current medications, past social history and problem list.    Past Medical History:   Diagnosis Date   • Age related osteoporosis    • Anemia    • Chronic gastritis    • Chronic renal insufficiency    • Gastric ulcer    • GERD (gastroesophageal reflux disease)    • Greater trochanteric bursitis    • H/O bone density study 11/2012    Osteopenia   • H/O mammogram 10/28/2015    stable   • Hyperlipidemia    • Hypertension    • Hypothyroidism    • Lumbago    • Osteoarthrosis    • PONV (postoperative nausea and vomiting)          Current Outpatient Medications:   •  amLODIPine (NORVASC) 2.5 MG tablet, Take 1 tablet by mouth Daily., Disp: 90 tablet, Rfl: 3  •  budesonide (PULMICORT) 90 MCG/ACT inhaler, Inhale 1 puff 2 (Two) Times a Day As Needed (shortness of breath). Rinse and spit after use, Disp: 3 inhaler, Rfl: 1  •  Cholecalciferol (VITAMIN D) 2000 UNITS capsule, " Take 1,000 Units by mouth., Disp: , Rfl:   •  Crisaborole 2 % ointment, Apply 1 Units topically 2 (Two) Times a Day., Disp: 60 g, Rfl: 1  •  denosumab (PROLIA) 60 MG/ML solution syringe, Inject  under the skin. Every 6 months, Disp: , Rfl:   •  diclofenac (VOLTAREN) 1 % gel gel, Apply 4 g topically 4 (Four) Times a Day As Needed (pain)., Disp: 300 g, Rfl: 1  •  famotidine (PEPCID) 20 MG tablet, Take 20 mg by mouth 2 (Two) Times a Day., Disp: , Rfl:   •  ferrous sulfate 325 (65 FE) MG tablet, Take 325 mg by mouth Daily With Breakfast., Disp: , Rfl:   •  HYDROcodone-acetaminophen (NORCO) 5-325 MG per tablet, Take 1 tablet by mouth Every 6 (Six) Hours As Needed for Moderate Pain ., Disp: 12 tablet, Rfl: 0  •  levothyroxine (SYNTHROID, LEVOTHROID) 100 MCG tablet, Take 1 tablet by mouth Daily., Disp: 90 tablet, Rfl: 3  •  lidocaine (LIDODERM) 5 %, Place 1 patch on the skin as directed by provider Daily. Remove & Discard patch within 12 hours or as directed by MD, Disp: 6 each, Rfl: 0  •  lisinopril (PRINIVIL,ZESTRIL) 20 MG tablet, Take 1 tablet by mouth 2 (Two) Times a Day., Disp: 180 tablet, Rfl: 3  •  meclizine (ANTIVERT) 12.5 MG tablet, Take 1 tablet by mouth 3 (Three) Times a Day As Needed for dizziness. 1-2 tablets every 8 hours as needed, Disp: 30 tablet, Rfl: 0  •  metoprolol tartrate (LOPRESSOR) 50 MG tablet, Take 1.5 tablets by mouth two times daily, Disp: 270 tablet, Rfl: 3  •  Multiple Vitamins-Minerals (MULTIVITAMIN PO), Take  by mouth., Disp: , Rfl:   •  ondansetron ODT (Zofran ODT) 4 MG disintegrating tablet, Place 1 tablet on the tongue Every 8 (Eight) Hours As Needed for Vomiting., Disp: 9 tablet, Rfl: 0  •  simvastatin (ZOCOR) 20 MG tablet, Take 1 tablet by mouth every night at bedtime., Disp: 90 tablet, Rfl: 3    Allergies   Allergen Reactions   • Codeine Nausea And Vomiting       Review of Systems   Constitutional: Negative for chills, fatigue, fever and unexpected weight change.   HENT: Negative for  "congestion, ear pain, postnasal drip, sinus pressure, sore throat and trouble swallowing.    Eyes: Negative for visual disturbance.   Respiratory: Negative for cough, chest tightness and wheezing.    Cardiovascular: Negative for chest pain, palpitations and leg swelling.   Gastrointestinal: Negative for abdominal pain, blood in stool, nausea and vomiting.   Genitourinary: Negative for dysuria, frequency and urgency.   Musculoskeletal: Positive for arthralgias and myalgias. Negative for joint swelling.   Skin: Negative for color change.   Neurological: Negative for syncope, weakness and headaches.   Hematological: Does not bruise/bleed easily.   Psychiatric/Behavioral: Positive for sleep disturbance.       Objective   Vitals:    08/28/20 1041   Height: 154.9 cm (61\")     Body mass index is 27.4 kg/m².  Physical Exam   Psychiatric: She has a normal mood and affect. Her behavior is normal. Judgment and thought content normal.       Assessment/Plan   Sylvia was seen today for hospital follow up visit, leg pain and restless legs syndrome.    Diagnoses and all orders for this visit:    Hematoma of left psoas region to anticoagulant therapy, initial encounter    Stage 3 chronic kidney disease (CMS/HCC)    1.  She is receiving physical therapy and does report feeling better with improved pain.  She is gradually increasing her activity level.  2.  Nephrology has recommended she stay on aspirin therapy.  However, due to recent hematoma I have asked her to discuss this again with him.  We also discussed restless leg and the possibility of medication.  She states her symptoms are infrequent (about every 6 months) and does not desire additional medication at this point.  Continue to monitor.    History and medical judgement obtained from phone conversation with patient. No physical examination was performed. Approximately 21 minutes spent in chart review and phone conversation with patient.    Current outpatient and discharge " medications have been reconciled for the patient.  Reviewed by: JUNE López

## 2020-09-08 DIAGNOSIS — I10 ESSENTIAL HYPERTENSION: ICD-10-CM

## 2020-09-08 RX ORDER — AMLODIPINE BESYLATE 2.5 MG/1
2.5 TABLET ORAL DAILY
Qty: 90 TABLET | Refills: 3 | Status: SHIPPED | OUTPATIENT
Start: 2020-09-08 | End: 2021-01-13 | Stop reason: SDUPTHER

## 2020-09-16 ENCOUNTER — TELEPHONE (OUTPATIENT)
Dept: INTERNAL MEDICINE | Facility: CLINIC | Age: 80
End: 2020-09-16

## 2020-09-16 NOTE — TELEPHONE ENCOUNTER
NICK WITH [facility] CALLED TO REPORT THAT PATIENT HAS BEEN DOING GREAT, REACHED ALL HER GOALS, AND WILL BE DISCHARGING FROM OT TODAY.    IF THERE IS ANY QUESTIONS, PONCE CAN BE REACHED [phone]

## 2020-09-18 ENCOUNTER — TELEPHONE (OUTPATIENT)
Dept: INTERNAL MEDICINE | Facility: CLINIC | Age: 80
End: 2020-09-18

## 2020-09-18 NOTE — TELEPHONE ENCOUNTER
JASMINA WITH [facility] CALLED TO ADVISE AND UPDATE DR. HOLBROOK PATIENT IS BEING RELEASED FROM [facility] PT AS OF TODAY 9/18/20     CALL BACK # [phone]

## 2020-10-12 RX ORDER — LISINOPRIL 20 MG/1
20 TABLET ORAL 2 TIMES DAILY
Qty: 180 TABLET | Refills: 3 | Status: SHIPPED | OUTPATIENT
Start: 2020-10-12 | End: 2021-09-22

## 2020-11-16 ENCOUNTER — HOSPITAL ENCOUNTER (INPATIENT)
Facility: HOSPITAL | Age: 80
LOS: 2 days | Discharge: HOME | End: 2020-11-18
Attending: EMERGENCY MEDICINE | Admitting: INTERNAL MEDICINE

## 2020-11-16 ENCOUNTER — APPOINTMENT (OUTPATIENT)
Dept: WOMENS IMAGING | Facility: HOSPITAL | Age: 80
End: 2020-11-16

## 2020-11-16 ENCOUNTER — APPOINTMENT (OUTPATIENT)
Dept: GENERAL RADIOLOGY | Facility: HOSPITAL | Age: 80
End: 2020-11-16

## 2020-11-16 ENCOUNTER — APPOINTMENT (OUTPATIENT)
Dept: CT IMAGING | Facility: HOSPITAL | Age: 80
End: 2020-11-16

## 2020-11-16 DIAGNOSIS — R55 SYNCOPE, UNSPECIFIED SYNCOPE TYPE: ICD-10-CM

## 2020-11-16 DIAGNOSIS — I10 ESSENTIAL HYPERTENSION: ICD-10-CM

## 2020-11-16 DIAGNOSIS — S06.5XAA SDH (SUBDURAL HEMATOMA) (HCC): ICD-10-CM

## 2020-11-16 DIAGNOSIS — I60.9 SAH (SUBARACHNOID HEMORRHAGE) (HCC): Primary | ICD-10-CM

## 2020-11-16 DIAGNOSIS — S09.90XA CLOSED HEAD INJURY, INITIAL ENCOUNTER: ICD-10-CM

## 2020-11-16 DIAGNOSIS — N18.9 CHRONIC KIDNEY DISEASE, UNSPECIFIED CKD STAGE: ICD-10-CM

## 2020-11-16 LAB
ALBUMIN SERPL-MCNC: 4.6 G/DL (ref 3.5–5.2)
ALBUMIN/GLOB SERPL: 1.6 G/DL
ALP SERPL-CCNC: 60 U/L (ref 39–117)
ALT SERPL W P-5'-P-CCNC: 15 U/L (ref 1–33)
ANION GAP SERPL CALCULATED.3IONS-SCNC: 11.6 MMOL/L (ref 5–15)
APTT PPP: 32.3 SECONDS (ref 22.7–35.4)
AST SERPL-CCNC: 19 U/L (ref 1–32)
B PARAPERT DNA SPEC QL NAA+PROBE: NOT DETECTED
B PERT DNA SPEC QL NAA+PROBE: NOT DETECTED
BACTERIA UR QL AUTO: NORMAL /HPF
BASOPHILS # BLD AUTO: 0.07 10*3/MM3 (ref 0–0.2)
BASOPHILS NFR BLD AUTO: 1.1 % (ref 0–1.5)
BILIRUB SERPL-MCNC: 0.4 MG/DL (ref 0–1.2)
BILIRUB UR QL STRIP: NEGATIVE
BUN SERPL-MCNC: 25 MG/DL (ref 8–23)
BUN/CREAT SERPL: 20.3 (ref 7–25)
C PNEUM DNA NPH QL NAA+NON-PROBE: NOT DETECTED
CALCIUM SPEC-SCNC: 10 MG/DL (ref 8.6–10.5)
CHLORIDE SERPL-SCNC: 99 MMOL/L (ref 98–107)
CLARITY UR: CLEAR
CO2 SERPL-SCNC: 24.4 MMOL/L (ref 22–29)
COLOR UR: YELLOW
CREAT SERPL-MCNC: 1.23 MG/DL (ref 0.57–1)
DEPRECATED RDW RBC AUTO: 45.1 FL (ref 37–54)
EOSINOPHIL # BLD AUTO: 0.33 10*3/MM3 (ref 0–0.4)
EOSINOPHIL NFR BLD AUTO: 5.3 % (ref 0.3–6.2)
ERYTHROCYTE [DISTWIDTH] IN BLOOD BY AUTOMATED COUNT: 13.1 % (ref 12.3–15.4)
FLUAV SUBTYP SPEC NAA+PROBE: NOT DETECTED
FLUBV RNA ISLT QL NAA+PROBE: NOT DETECTED
GFR SERPL CREATININE-BSD FRML MDRD: 42 ML/MIN/1.73
GLOBULIN UR ELPH-MCNC: 2.8 GM/DL
GLUCOSE SERPL-MCNC: 105 MG/DL (ref 65–99)
GLUCOSE UR STRIP-MCNC: NEGATIVE MG/DL
HADV DNA SPEC NAA+PROBE: NOT DETECTED
HCOV 229E RNA SPEC QL NAA+PROBE: NOT DETECTED
HCOV HKU1 RNA SPEC QL NAA+PROBE: NOT DETECTED
HCOV NL63 RNA SPEC QL NAA+PROBE: NOT DETECTED
HCOV OC43 RNA SPEC QL NAA+PROBE: NOT DETECTED
HCT VFR BLD AUTO: 40.7 % (ref 34–46.6)
HGB BLD-MCNC: 13.5 G/DL (ref 12–15.9)
HGB UR QL STRIP.AUTO: ABNORMAL
HMPV RNA NPH QL NAA+NON-PROBE: NOT DETECTED
HPIV1 RNA SPEC QL NAA+PROBE: NOT DETECTED
HPIV2 RNA SPEC QL NAA+PROBE: NOT DETECTED
HPIV3 RNA NPH QL NAA+PROBE: NOT DETECTED
HPIV4 P GENE NPH QL NAA+PROBE: NOT DETECTED
HYALINE CASTS UR QL AUTO: NORMAL /LPF
IMM GRANULOCYTES # BLD AUTO: 0.02 10*3/MM3 (ref 0–0.05)
IMM GRANULOCYTES NFR BLD AUTO: 0.3 % (ref 0–0.5)
INR PPP: 1.03 (ref 0.9–1.1)
KETONES UR QL STRIP: NEGATIVE
LEUKOCYTE ESTERASE UR QL STRIP.AUTO: NEGATIVE
LYMPHOCYTES # BLD AUTO: 0.85 10*3/MM3 (ref 0.7–3.1)
LYMPHOCYTES NFR BLD AUTO: 13.6 % (ref 19.6–45.3)
M PNEUMO IGG SER IA-ACNC: NOT DETECTED
MAGNESIUM SERPL-MCNC: 2.3 MG/DL (ref 1.6–2.4)
MCH RBC QN AUTO: 31.5 PG (ref 26.6–33)
MCHC RBC AUTO-ENTMCNC: 33.2 G/DL (ref 31.5–35.7)
MCV RBC AUTO: 94.9 FL (ref 79–97)
MONOCYTES # BLD AUTO: 0.79 10*3/MM3 (ref 0.1–0.9)
MONOCYTES NFR BLD AUTO: 12.7 % (ref 5–12)
NEUTROPHILS NFR BLD AUTO: 4.17 10*3/MM3 (ref 1.7–7)
NEUTROPHILS NFR BLD AUTO: 67 % (ref 42.7–76)
NITRITE UR QL STRIP: NEGATIVE
NRBC BLD AUTO-RTO: 0 /100 WBC (ref 0–0.2)
NT-PROBNP SERPL-MCNC: 254.6 PG/ML (ref 0–1800)
PH UR STRIP.AUTO: 7.5 [PH] (ref 5–8)
PLATELET # BLD AUTO: 210 10*3/MM3 (ref 140–450)
PMV BLD AUTO: 9.5 FL (ref 6–12)
POTASSIUM SERPL-SCNC: 4.6 MMOL/L (ref 3.5–5.2)
PROT SERPL-MCNC: 7.4 G/DL (ref 6–8.5)
PROT UR QL STRIP: ABNORMAL
PROTHROMBIN TIME: 13.3 SECONDS (ref 11.7–14.2)
QT INTERVAL: 453 MS
RBC # BLD AUTO: 4.29 10*6/MM3 (ref 3.77–5.28)
RBC # UR: NORMAL /HPF
REF LAB TEST METHOD: NORMAL
RHINOVIRUS RNA SPEC NAA+PROBE: NOT DETECTED
RSV RNA NPH QL NAA+NON-PROBE: NOT DETECTED
SARS-COV-2 RNA NPH QL NAA+NON-PROBE: NOT DETECTED
SODIUM SERPL-SCNC: 135 MMOL/L (ref 136–145)
SP GR UR STRIP: 1.01 (ref 1–1.03)
SQUAMOUS #/AREA URNS HPF: NORMAL /HPF
TROPONIN T SERPL-MCNC: <0.01 NG/ML (ref 0–0.03)
TSH SERPL DL<=0.05 MIU/L-ACNC: 0.37 UIU/ML (ref 0.27–4.2)
UROBILINOGEN UR QL STRIP: ABNORMAL
WBC # BLD AUTO: 6.23 10*3/MM3 (ref 3.4–10.8)
WBC UR QL AUTO: NORMAL /HPF

## 2020-11-16 PROCEDURE — 84484 ASSAY OF TROPONIN QUANT: CPT | Performed by: PHYSICIAN ASSISTANT

## 2020-11-16 PROCEDURE — 93010 ELECTROCARDIOGRAM REPORT: CPT | Performed by: INTERNAL MEDICINE

## 2020-11-16 PROCEDURE — 71045 X-RAY EXAM CHEST 1 VIEW: CPT

## 2020-11-16 PROCEDURE — 83735 ASSAY OF MAGNESIUM: CPT | Performed by: PHYSICIAN ASSISTANT

## 2020-11-16 PROCEDURE — 85730 THROMBOPLASTIN TIME PARTIAL: CPT | Performed by: EMERGENCY MEDICINE

## 2020-11-16 PROCEDURE — 85610 PROTHROMBIN TIME: CPT | Performed by: EMERGENCY MEDICINE

## 2020-11-16 PROCEDURE — 70450 CT HEAD/BRAIN W/O DYE: CPT

## 2020-11-16 PROCEDURE — 81001 URINALYSIS AUTO W/SCOPE: CPT | Performed by: PHYSICIAN ASSISTANT

## 2020-11-16 PROCEDURE — 93005 ELECTROCARDIOGRAM TRACING: CPT | Performed by: PHYSICIAN ASSISTANT

## 2020-11-16 PROCEDURE — 83880 ASSAY OF NATRIURETIC PEPTIDE: CPT | Performed by: PHYSICIAN ASSISTANT

## 2020-11-16 PROCEDURE — 84443 ASSAY THYROID STIM HORMONE: CPT | Performed by: HOSPITALIST

## 2020-11-16 PROCEDURE — 0202U NFCT DS 22 TRGT SARS-COV-2: CPT | Performed by: EMERGENCY MEDICINE

## 2020-11-16 PROCEDURE — 99285 EMERGENCY DEPT VISIT HI MDM: CPT

## 2020-11-16 PROCEDURE — 80053 COMPREHEN METABOLIC PANEL: CPT | Performed by: PHYSICIAN ASSISTANT

## 2020-11-16 PROCEDURE — 85025 COMPLETE CBC W/AUTO DIFF WBC: CPT | Performed by: PHYSICIAN ASSISTANT

## 2020-11-16 PROCEDURE — 72125 CT NECK SPINE W/O DYE: CPT

## 2020-11-16 RX ORDER — FAMOTIDINE 20 MG/1
20 TABLET, FILM COATED ORAL
Status: DISCONTINUED | OUTPATIENT
Start: 2020-11-17 | End: 2020-11-18 | Stop reason: HOSPADM

## 2020-11-16 RX ORDER — MECLIZINE HCL 12.5 MG/1
12.5 TABLET ORAL 3 TIMES DAILY PRN
Status: DISCONTINUED | OUTPATIENT
Start: 2020-11-16 | End: 2020-11-18 | Stop reason: HOSPADM

## 2020-11-16 RX ORDER — SODIUM CHLORIDE 0.9 % (FLUSH) 0.9 %
10 SYRINGE (ML) INJECTION EVERY 12 HOURS SCHEDULED
Status: DISCONTINUED | OUTPATIENT
Start: 2020-11-16 | End: 2020-11-18 | Stop reason: HOSPADM

## 2020-11-16 RX ORDER — FERROUS SULFATE 325(65) MG
325 TABLET ORAL
Status: DISCONTINUED | OUTPATIENT
Start: 2020-11-17 | End: 2020-11-18 | Stop reason: HOSPADM

## 2020-11-16 RX ORDER — SODIUM CHLORIDE 0.9 % (FLUSH) 0.9 %
10 SYRINGE (ML) INJECTION AS NEEDED
Status: DISCONTINUED | OUTPATIENT
Start: 2020-11-16 | End: 2020-11-18 | Stop reason: HOSPADM

## 2020-11-16 RX ORDER — ACETAMINOPHEN 650 MG/1
650 SUPPOSITORY RECTAL EVERY 4 HOURS PRN
Status: DISCONTINUED | OUTPATIENT
Start: 2020-11-16 | End: 2020-11-18 | Stop reason: HOSPADM

## 2020-11-16 RX ORDER — AMLODIPINE BESYLATE 2.5 MG/1
2.5 TABLET ORAL DAILY
Status: DISCONTINUED | OUTPATIENT
Start: 2020-11-17 | End: 2020-11-18 | Stop reason: HOSPADM

## 2020-11-16 RX ORDER — LEVOTHYROXINE SODIUM 0.1 MG/1
100 TABLET ORAL
Status: DISCONTINUED | OUTPATIENT
Start: 2020-11-17 | End: 2020-11-18 | Stop reason: HOSPADM

## 2020-11-16 RX ORDER — ACETAMINOPHEN 325 MG/1
650 TABLET ORAL EVERY 4 HOURS PRN
Status: DISCONTINUED | OUTPATIENT
Start: 2020-11-16 | End: 2020-11-18 | Stop reason: HOSPADM

## 2020-11-16 RX ORDER — NITROGLYCERIN 0.4 MG/1
0.4 TABLET SUBLINGUAL
Status: DISCONTINUED | OUTPATIENT
Start: 2020-11-16 | End: 2020-11-18 | Stop reason: HOSPADM

## 2020-11-16 RX ORDER — ONDANSETRON 4 MG/1
4 TABLET, ORALLY DISINTEGRATING ORAL EVERY 8 HOURS PRN
Status: DISCONTINUED | OUTPATIENT
Start: 2020-11-16 | End: 2020-11-18 | Stop reason: HOSPADM

## 2020-11-16 RX ORDER — LISINOPRIL 20 MG/1
20 TABLET ORAL 2 TIMES DAILY
Status: DISCONTINUED | OUTPATIENT
Start: 2020-11-17 | End: 2020-11-18 | Stop reason: HOSPADM

## 2020-11-16 RX ORDER — ONDANSETRON 2 MG/ML
4 INJECTION INTRAMUSCULAR; INTRAVENOUS EVERY 6 HOURS PRN
Status: DISCONTINUED | OUTPATIENT
Start: 2020-11-16 | End: 2020-11-18 | Stop reason: HOSPADM

## 2020-11-16 RX ORDER — ACETAMINOPHEN 160 MG/5ML
650 SOLUTION ORAL EVERY 4 HOURS PRN
Status: DISCONTINUED | OUTPATIENT
Start: 2020-11-16 | End: 2020-11-18 | Stop reason: HOSPADM

## 2020-11-16 RX ORDER — HYDROCODONE BITARTRATE AND ACETAMINOPHEN 5; 325 MG/1; MG/1
1 TABLET ORAL EVERY 6 HOURS PRN
Status: DISCONTINUED | OUTPATIENT
Start: 2020-11-16 | End: 2020-11-18 | Stop reason: HOSPADM

## 2020-11-16 RX ORDER — ATORVASTATIN CALCIUM 20 MG/1
10 TABLET, FILM COATED ORAL DAILY
Status: DISCONTINUED | OUTPATIENT
Start: 2020-11-17 | End: 2020-11-18 | Stop reason: HOSPADM

## 2020-11-16 RX ADMIN — SODIUM CHLORIDE, PRESERVATIVE FREE 10 ML: 5 INJECTION INTRAVENOUS at 21:01

## 2020-11-16 RX ADMIN — SODIUM CHLORIDE 1000 ML: 9 INJECTION, SOLUTION INTRAVENOUS at 18:27

## 2020-11-16 NOTE — ED TRIAGE NOTES
"Patient presents to er via ems from [facility].  Patient had a mechanical fall with positive LOC reported by MD on site of \"three minutes\".  Patient has repeative questions.  Patient to self and place/situation.  Patient was placed in face mask during first look triage.  Patient was wearing a face mask throughout encounter.  I wore personal protective equipment throughout the encounter.  Hand hygiene was performed before and after patient encounter.   "

## 2020-11-16 NOTE — ED PROVIDER NOTES
EMERGENCY DEPARTMENT ENCOUNTER    Room Number:  P586/1  Date seen:  11/16/2020  Time seen: 14:52 EST  PCP: Rhoda Luna APRN  Historian: Patient    HPI:  Chief complaint: Head injury  A complete HPI/ROS/PMH/PSH/SH/FH are unobtainable due to: None  Context:Sylvia Stover is a 80 y.o. female, who presents to the ED [facility] when the next thing she remembers she was on hardwood floor at the mammogram center.  She is unsure if she had a syncopal episode which made her fall and hit her head or hit her head first which made her lose consciousness.  She does report she felt mildly confused shortly afterwards.  Per EMS, patient lost consciousness for about 3 minutes. She did injure the tip of her tongue upon fall. Deneis any urinary or bowel incontinence. Has history of vasovagal syncope in the past.      Patient was placed in face mask in first look. Patient was wearing facemask when I entered the room and throughout our encounter. I wore full protective equipment throughout this patient encounter including a face mask, goggles. and gloves. Hand hygiene was performed before donning protective equipment and after removal when leaving the room.      MEDICAL RECORD REVIEW      ALLERGIES  Codeine    PAST MEDICAL HISTORY  Active Ambulatory Problems     Diagnosis Date Noted   • Postoperative hypothyroidism    • Essential hypertension    • Hyperlipidemia    • Stage 3 chronic kidney disease    • Osteoarthritis of right knee 10/29/2014   • Osteoporosis, senile 06/12/2018   • Fall 08/16/2020   • Hypothyroidism 08/16/2020   • GERD (gastroesophageal reflux disease) 08/16/2020   • Osteoporosis 08/16/2020   • Subdural hematoma (CMS/HCC) 08/18/2020     Resolved Ambulatory Problems     Diagnosis Date Noted   • No Resolved Ambulatory Problems     Past Medical History:   Diagnosis Date   • Age related osteoporosis    • Anemia    • Chronic gastritis    • Chronic renal  insufficiency    • Gastric ulcer    • Greater trochanteric bursitis    • H/O bone density study 2012   • H/O mammogram 10/28/2015   • Hypertension    • Lumbago    • Osteoarthrosis    • PONV (postoperative nausea and vomiting)        PAST SURGICAL HISTORY  Past Surgical History:   Procedure Laterality Date   • COLONOSCOPY  2006   • COLONOSCOPY N/A 2016    Procedure: COLONOSCOPY into cecum/terminal ileum;  Surgeon: Huy Sam MD;  Location: [facility] ENDOSCOPY;  Service:    • ENDOSCOPY N/A 2016    Procedure: ESOPHAGOGASTRODUODENOSCOPY with biopsy;  Surgeon: Huy Sam MD;  [facility];  Service:    • ENDOSCOPY N/A 2016    Procedure: ESOPHAGOGASTRODUODENOSCOPY WITH BIOPSIES (COLD);  Surgeon: Huy Sam MD;  Location: [facility] ENDOSCOPY;  Service:    • FOOT SURGERY     • KNEE SURGERY     • PAP SMEAR  2013   • TONSILLECTOMY     • TOTAL THYROIDECTOMY     • UPPER GASTROINTESTINAL ENDOSCOPY  2014    Frediary Gonzalo Grade IV reflux esophagitis, gastric ulcer w/clean base, chronic gastritis, no h-pylori       FAMILY HISTORY  Family History   Problem Relation Age of Onset   • Colon cancer Mother    • Kidney cancer Brother        SOCIAL HISTORY  Social History     Socioeconomic History   • Marital status:      Spouse name: Not on file   • Number of children: Not on file   • Years of education: Not on file   • Highest education level: Not on file   Tobacco Use   • Smoking status: Former Smoker     Packs/day: 0.25     Types: Cigarettes     Quit date:      Years since quittin.8   • Smokeless tobacco: Never Used   Substance and Sexual Activity   • Alcohol use: No   • Drug use: No   • Sexual activity: Defer       REVIEW OF SYSTEMS  Review of Systems    All systems reviewed and negative except for those discussed in HPI.     PHYSICAL EXAM    ED Triage Vitals [20 1443]   Temp Heart Rate Resp BP SpO2   97.2 °F (36.2 °C) 54 16 (!) 168/110 97 %      Temp  src Heart Rate Source Patient Position BP Location FiO2 (%)   Tympanic Monitor -- -- --     Physical Exam    I have reviewed the triage vital signs and nursing notes.      GENERAL: not distressed, awake alert oriented x3  HENT: nares patent, there is dried blood surrounding her mouth with a small area of superficial abrasion to the tip of her tongue  EYES: no scleral icterus  NECK: no ROM limitations; no c spine tenderness  CV: regular rhythm, regular rate  RESPIRATORY: normal effort, clear to auscultation bilaterally  ABDOMEN: soft, nontender  : deferred  MUSCULOSKELETAL: no deformity  NEURO: alert, moves all extremities, follows commands    Cranial nerves 2-12 intact as tested.  Sensation intact.  5/5 strength in all extremities.  Normal cerebellar testing.  No drift in any extremity.  No dysarthria.  No aphasia.  No neglect/extinction.     SKIN: warm, dry    LAB RESULTS  Recent Results (from the past 24 hour(s))   ECG 12 Lead    Collection Time: 11/16/20  3:15 PM   Result Value Ref Range    QT Interval 453 ms   Comprehensive Metabolic Panel    Collection Time: 11/16/20  3:16 PM    Specimen: Blood   Result Value Ref Range    Glucose 105 (H) 65 - 99 mg/dL    BUN 25 (H) 8 - 23 mg/dL    Creatinine 1.23 (H) 0.57 - 1.00 mg/dL    Sodium 135 (L) 136 - 145 mmol/L    Potassium 4.6 3.5 - 5.2 mmol/L    Chloride 99 98 - 107 mmol/L    CO2 24.4 22.0 - 29.0 mmol/L    Calcium 10.0 8.6 - 10.5 mg/dL    Total Protein 7.4 6.0 - 8.5 g/dL    Albumin 4.60 3.50 - 5.20 g/dL    ALT (SGPT) 15 1 - 33 U/L    AST (SGOT) 19 1 - 32 U/L    Alkaline Phosphatase 60 39 - 117 U/L    Total Bilirubin 0.4 0.0 - 1.2 mg/dL    eGFR Non African Amer 42 (L) >60 mL/min/1.73    Globulin 2.8 gm/dL    A/G Ratio 1.6 g/dL    BUN/Creatinine Ratio 20.3 7.0 - 25.0    Anion Gap 11.6 5.0 - 15.0 mmol/L   Troponin    Collection Time: 11/16/20  3:16 PM    Specimen: Blood   Result Value Ref Range    Troponin T <0.010 0.000 - 0.030 ng/mL   BNP    Collection Time: 11/16/20   3:16 PM    Specimen: Blood   Result Value Ref Range    proBNP 254.6 0.0-1,800.0 pg/mL   CBC Auto Differential    Collection Time: 11/16/20  3:16 PM    Specimen: Blood   Result Value Ref Range    WBC 6.23 3.40 - 10.80 10*3/mm3    RBC 4.29 3.77 - 5.28 10*6/mm3    Hemoglobin 13.5 12.0 - 15.9 g/dL    Hematocrit 40.7 34.0 - 46.6 %    MCV 94.9 79.0 - 97.0 fL    MCH 31.5 26.6 - 33.0 pg    MCHC 33.2 31.5 - 35.7 g/dL    RDW 13.1 12.3 - 15.4 %    RDW-SD 45.1 37.0 - 54.0 fl    MPV 9.5 6.0 - 12.0 fL    Platelets 210 140 - 450 10*3/mm3    Neutrophil % 67.0 42.7 - 76.0 %    Lymphocyte % 13.6 (L) 19.6 - 45.3 %    Monocyte % 12.7 (H) 5.0 - 12.0 %    Eosinophil % 5.3 0.3 - 6.2 %    Basophil % 1.1 0.0 - 1.5 %    Immature Grans % 0.3 0.0 - 0.5 %    Neutrophils, Absolute 4.17 1.70 - 7.00 10*3/mm3    Lymphocytes, Absolute 0.85 0.70 - 3.10 10*3/mm3    Monocytes, Absolute 0.79 0.10 - 0.90 10*3/mm3    Eosinophils, Absolute 0.33 0.00 - 0.40 10*3/mm3    Basophils, Absolute 0.07 0.00 - 0.20 10*3/mm3    Immature Grans, Absolute 0.02 0.00 - 0.05 10*3/mm3    nRBC 0.0 0.0 - 0.2 /100 WBC   Magnesium    Collection Time: 11/16/20  3:16 PM    Specimen: Blood   Result Value Ref Range    Magnesium 2.3 1.6 - 2.4 mg/dL   TSH    Collection Time: 11/16/20  3:16 PM    Specimen: Blood   Result Value Ref Range    TSH 0.368 0.270 - 4.200 uIU/mL   Urinalysis With Microscopic If Indicated (No Culture) - Urine, Clean Catch    Collection Time: 11/16/20  4:14 PM    Specimen: Urine, Clean Catch   Result Value Ref Range    Color, UA Yellow Yellow, Straw    Appearance, UA Clear Clear    pH, UA 7.5 5.0 - 8.0    Specific Gravity, UA 1.011 1.005 - 1.030    Glucose, UA Negative Negative    Ketones, UA Negative Negative    Bilirubin, UA Negative Negative    Blood, UA Trace (A) Negative    Protein, UA Trace (A) Negative    Leuk Esterase, UA Negative Negative    Nitrite, UA Negative Negative    Urobilinogen, UA 0.2 E.U./dL 0.2 - 1.0 E.U./dL   Urinalysis, Microscopic Only -  Urine, Clean Catch    Collection Time: 11/16/20  4:14 PM    Specimen: Urine, Clean Catch   Result Value Ref Range    RBC, UA 0-2 None Seen, 0-2 /HPF    WBC, UA 0-2 None Seen, 0-2 /HPF    Bacteria, UA None Seen None Seen /HPF    Squamous Epithelial Cells, UA 0-2 None Seen, 0-2 /HPF    Hyaline Casts, UA None Seen None Seen /LPF    Methodology Automated Microscopy    Protime-INR    Collection Time: 11/16/20  6:34 PM    Specimen: Blood   Result Value Ref Range    Protime 13.3 11.7 - 14.2 Seconds    INR 1.03 0.90 - 1.10   aPTT    Collection Time: 11/16/20  6:34 PM    Specimen: Blood   Result Value Ref Range    PTT 32.3 22.7 - 35.4 seconds   Respiratory Panel PCR w/COVID-19(SARS-CoV-2) ERNESTO/JOSUÉ/SHAHIDA/PAD/COR/MAD/TIFFANIE In-House, NP Swab in UTM/VTM, 3-4 HR TAT - Swab, Nasopharynx    Collection Time: 11/16/20  7:44 PM    Specimen: Nasopharynx; Swab   Result Value Ref Range    ADENOVIRUS, PCR Not Detected Not Detected    Coronavirus 229E Not Detected Not Detected    Coronavirus HKU1 Not Detected Not Detected    Coronavirus NL63 Not Detected Not Detected    Coronavirus OC43 Not Detected Not Detected    COVID19 Not Detected Not Detected - Ref. Range    Human Metapneumovirus Not Detected Not Detected    Human Rhinovirus/Enterovirus Not Detected Not Detected    Influenza A PCR Not Detected Not Detected    Influenza B PCR Not Detected Not Detected    Parainfluenza Virus 1 Not Detected Not Detected    Parainfluenza Virus 2 Not Detected Not Detected    Parainfluenza Virus 3 Not Detected Not Detected    Parainfluenza Virus 4 Not Detected Not Detected    RSV, PCR Not Detected Not Detected    Bordetella pertussis pcr Not Detected Not Detected    Bordetella parapertussis PCR Not Detected Not Detected    Chlamydophila pneumoniae PCR Not Detected Not Detected    Mycoplasma pneumo by PCR Not Detected Not Detected         RADIOLOGY RESULTS  CT Head Without Contrast   Final Result           1. Critical test result: Small right parafalcine  subdural hematoma,   small bilateral anterior medial frontal lobe subarachnoid hemorrhage,   close follow-up recommended characterize change.   2. No acute cervical fracture is identified. Age-indeterminate   compression deformity of T2, correlate with location of pain.   Degenerative changes of the cervical spine. If there is further clinical   concern, MRI could be considered for further evaluation.       Discussed by telephone with Johnna Mendieta time of interpretation, 1717,   11/16/2020.               This report was finalized on 11/16/2020 5:19 PM by Dr. Joseluis Khan M.D.          CT Cervical Spine Without Contrast   Final Result           1. Critical test result: Small right parafalcine subdural hematoma,   small bilateral anterior medial frontal lobe subarachnoid hemorrhage,   close follow-up recommended characterize change.   2. No acute cervical fracture is identified. Age-indeterminate   compression deformity of T2, correlate with location of pain.   Degenerative changes of the cervical spine. If there is further clinical   concern, MRI could be considered for further evaluation.       Discussed by telephone with Johnna Mendieta time of interpretation, 1717,   11/16/2020.               This report was finalized on 11/16/2020 5:19 PM by Dr. Joseluis Khan M.D.          XR Chest 1 View   Final Result   No focal pulmonary consolidation. Cardiomegaly. Follow-up as   clinical indications persist.       This report was finalized on 11/16/2020 3:50 PM by Dr. Joseluis Khan M.D.                PROGRESS, DATA ANALYSIS, CONSULTS AND MEDICAL DECISION MAKING  All labs have been independently reviewed by me.  All radiology studies have been reviewed by me and discussed with radiologist dictating the report. Discussion below represents my analysis of pertinent findings related to patient's condition, differential diagnosis, treatment plan and final disposition.     ED Course as of Nov 16 2329   Mon  Nov 16, 2020   1559 EKG Independently viewed and contemporaneously interpreted by ED physician  Time: 1515. HR 54. Interpretation: Sinus bradycardia, normal axis, NQRS, no acute ST changes, significant artifact present        [JG]   1714 I rechecked patient informed patient and daughter about results. Waiting on CT head and cspine results from radiology. Offered admission due to her syncopal episode with no prodrome.  Pt's neuro exam is still normal. Daughter and patient would like her to walk around the emergency room to see how she feels.  We will do a road test and wait for CT results to determine dispo.    [SS]   1719 I have discussed with Dr. Khan, radiology regarding patient's CT results.  He notes a small subdural hematoma to the falx cerebri that is approximately 2 mm with a subarachnoid hemorrhage to his anterior medial frontal lobes.  Will contact neurosurgery at this time.    [SS]   1720 I rechecked patient patient patient's blood pressure is now 104/83 and the head of the bed is elevated to approximately 45 degrees.  Waiting on neurosurgery call at this time.    [SS]   1726 CT imaging significant for subdural hemorrhage and subarachnoid hemorrhage.  Patient had normal neuro exam upon my evaluation, JOHN reevaluating.  Patient was sitting upright, head of the bed elevated.  Obtain repeat blood pressure on patient, will use nicardipine drip if needed for blood pressure control.  Immediately consulting neurosurgery.    [JG]   1728 Obtaining coagulation factors, patient will require admission, likely to the ICU, obtaining rapid Covid swab.    [JG]   1734 Patient reassessed, continues to be normal neuro exam.    [JG]   1737 Phone call with Dr Frye.  Discussed the patient, relevant history, exam, diagnostics, ED findings/progress, and concerns.  He states no blood pressure control as long as patient remains under 160 systolic, if blood sugar pressure becomes elevated, recommends nicardipine drip.  He  "request admission to hospitalist.  He states patient does not need ICU, request admission to telemetry floor.    [JG]   1745 Phone call with Dr Alfredo.  Discussed the patient, relevant history, exam, diagnostics, ED findings/progress, and concerns. They agree to admit the patient to tele. Care assumed by the admitting physician at this time.        [JG]   1753 Patient reassessed.  Discussed ED findings, differential diagnosis, and the need for admission for evaluation/treatment.  They are agreeable to admission and all questions were answered.        [JG]   1802 I rechecked patient and patient is still resting comfortably in bed.  Her blood pressure is 168/104 at this time.  Neurologically intact.  Informed patient and daughter the plan for admission.  They expressed understanding and all questions.    [SS]      ED Course User Index  [JG] Jerry Andrade MD  [SS] Johnna Mendieta PA-C       The differential diagnosis include but are not limited to subarachnoid hemorrhage, c spine fracture, seizure, vasovagal syncope, subdural hematoma.         Reviewed pt's history and workup with Dr. Andrade.  After a bedside evaluation, Dr. Andrade agrees with the plan of care.    verbally advised them to follow up with their primary physician or local health clinic.    (FOR ADMIT) Based on the patient's lab findings and presenting symptoms, the doctor and I feel it is appropriate to admit the patient for further management, evaluation, and treatment.  I have discussed this with the admitting team.  I have also discussed this with the patient/family.  They are in agreement with admission.          Disposition vitals:  BP (!) 107/30 (BP Location: Left arm, Patient Position: Lying)   Pulse 58   Temp 98.1 °F (36.7 °C) (Oral)   Resp 18   Ht 154.9 cm (61\")   Wt 69.1 kg (152 lb 6.4 oz)   SpO2 97%   BMI 28.80 kg/m²       DIAGNOSIS  Final diagnoses:   SAH (subarachnoid hemorrhage) (CMS/HCC)   SDH (subdural hematoma) (CMS/HCC) "   Closed head injury, initial encounter   Syncope, unspecified syncope type   Chronic kidney disease, unspecified CKD stage       FOLLOW UP   No follow-up provider specified.       Johnna Mendieta PA-C  11/16/20 5921

## 2020-11-16 NOTE — ED NOTES
Pt to this er following a fall at an outpatient center while receiving a mammogram. Pt last remembers the test being performed. Said that the syncope event was sudden. Pt was reportedly unconscious for 3 minutes and she was asking repetitive questions en route. Pt is alert and orietnted x4. Reports that she has some tenderness to her head. Pt has dried blood in her mouth around her tongue.Unsure how it happened, denies history of seizures. This nurse in room wearing mask, eye protection and gloves. Pt wearing mask throughout encounter. Pt Visitor wearing mask.        Cora Booker, ANSON  11/16/20 1447

## 2020-11-16 NOTE — H&P
Patient Name:  Sylvia Stover  YOB: 1940  MRN:  [id number]  Admit Date:  11/16/2020  Patient Care Team:  Rhoda Luna APRN as PCP - General (Internal Medicine)  Jadiel Spence MD as Consulting Physician (Nephrology)      Chief Complaint   Patient presents with   • Altered Mental Status   • Fall       Subjective     Ms. Stover is a 80 y.o. female with a history of HLD, HTN, anemia that presents to [facility] complaining of fall. Patient states she was getting a mammogram today and passed out during the procedure, falling and hitting the back of her head. She states she doesn't remember anything until EMS arrived and states she does not remember feeling dizzy or lightheaded prior to passing out. She takes aspirin daily, on no other anticoagulation. Patient reports that she has passed out before, though not in a while, and does not see a cardiologist. Patient denies injuries otherwise and denies fever, chest pain, shortness of breath, abdominal pain, changes in bowel or bladder habits, edema. Labs done tonight were fairly unremarkable though CT head does show small subdural hematoma and small frontal lobe subarachnoid hemorrhage. She denies neuro complaints or headache. Became slightly hypertensive in ED and was started on Cardene drip per EVELIN recommendations to keep systolic BP less than 160.     History of Present Illness    Past Medical History:   Diagnosis Date   • Age related osteoporosis    • Anemia    • Chronic gastritis    • Chronic renal insufficiency    • Gastric ulcer    • GERD (gastroesophageal reflux disease)    • Greater trochanteric bursitis    • H/O bone density study 11/2012    Osteopenia   • H/O mammogram 10/28/2015    stable   • Hyperlipidemia    • Hypertension    • Hypothyroidism    • Lumbago    • Osteoarthrosis    • PONV (postoperative nausea and vomiting)      Past Surgical History:   Procedure Laterality Date   • COLONOSCOPY  07/06/2006   •  COLONOSCOPY N/A 2016    Procedure: COLONOSCOPY into cecum/terminal ileum;  Surgeon: Huy Sam MD;  Location:  [facility];  Service:    • ENDOSCOPY N/A 2016    Procedure: ESOPHAGOGASTRODUODENOSCOPY with biopsy;  Surgeon: Huy Sam MD;  Location:  [facility];  Service:    • ENDOSCOPY N/A 2016    Procedure: ESOPHAGOGASTRODUODENOSCOPY WITH BIOPSIES (COLD);  Surgeon: Huy Sam MD;  Location:  [facility];  Service:    • FOOT SURGERY     • KNEE SURGERY     • PAP SMEAR  2013   • TONSILLECTOMY     • TOTAL THYROIDECTOMY     • UPPER GASTROINTESTINAL ENDOSCOPY  2014    Savary Sánchez Grade IV reflux esophagitis, gastric ulcer w/clean base, chronic gastritis, no h-pylori     Family History   Problem Relation Age of Onset   • Colon cancer Mother    • Kidney cancer Brother      Social History     Tobacco Use   • Smoking status: Former Smoker     Packs/day: 0.25     Types: Cigarettes     Quit date:      Years since quittin.8   • Smokeless tobacco: Never Used   Substance Use Topics   • Alcohol use: No   • Drug use: No     Medications Prior to Admission   Medication Sig Dispense Refill Last Dose   • amLODIPine (NORVASC) 2.5 MG tablet Take 1 tablet by mouth Daily. 90 tablet 3    • budesonide (PULMICORT) 90 MCG/ACT inhaler Inhale 1 puff 2 (Two) Times a Day As Needed (shortness of breath). Rinse and spit after use 3 inhaler 1    • Cholecalciferol (VITAMIN D) 2000 UNITS capsule Take 1,000 Units by mouth.      • Crisaborole 2 % ointment Apply 1 Units topically 2 (Two) Times a Day. 60 g 1    • denosumab (PROLIA) 60 MG/ML solution syringe Inject  under the skin. Every 6 months      • diclofenac (VOLTAREN) 1 % gel gel Apply 4 g topically 4 (Four) Times a Day As Needed (pain). 300 g 1    • famotidine (PEPCID) 20 MG tablet Take 20 mg by mouth 2 (Two) Times a Day.      • ferrous sulfate 325 (65 FE) MG tablet Take 325 mg by mouth Daily With Breakfast.      •  HYDROcodone-acetaminophen (NORCO) 5-325 MG per tablet Take 1 tablet by mouth Every 6 (Six) Hours As Needed for Moderate Pain . 12 tablet 0    • levothyroxine (SYNTHROID, LEVOTHROID) 100 MCG tablet Take 1 tablet by mouth Daily. 90 tablet 3    • lidocaine (LIDODERM) 5 % Place 1 patch on the skin as directed by provider Daily. Remove & Discard patch within 12 hours or as directed by MD 6 each 0    • lisinopril (PRINIVIL,ZESTRIL) 20 MG tablet Take 1 tablet by mouth 2 (Two) Times a Day. 180 tablet 3    • meclizine (ANTIVERT) 12.5 MG tablet Take 1 tablet by mouth 3 (Three) Times a Day As Needed for dizziness. 1-2 tablets every 8 hours as needed 30 tablet 0    • metoprolol tartrate (LOPRESSOR) 50 MG tablet Take 1.5 tablets by mouth two times daily 270 tablet 3    • Multiple Vitamins-Minerals (MULTIVITAMIN PO) Take  by mouth.      • ondansetron ODT (Zofran ODT) 4 MG disintegrating tablet Place 1 tablet on the tongue Every 8 (Eight) Hours As Needed for Vomiting. 9 tablet 0    • simvastatin (ZOCOR) 20 MG tablet Take 1 tablet by mouth every night at bedtime. 90 tablet 3      Allergies:    Allergies   Allergen Reactions   • Codeine Nausea And Vomiting       Review of Systems   Constitutional: Negative.  Negative for chills and fever.   HENT: Negative.  Negative for congestion and sore throat.    Eyes: Negative.    Respiratory: Negative.  Negative for cough and shortness of breath.    Cardiovascular: Negative.  Negative for chest pain and leg swelling.   Gastrointestinal: Negative.  Negative for abdominal pain, nausea and vomiting.   Endocrine: Negative.    Genitourinary: Negative.  Negative for dysuria, frequency and urgency.   Musculoskeletal: Negative.  Negative for arthralgias and myalgias.   Skin: Negative.  Negative for color change and pallor.   Allergic/Immunologic: Negative.    Neurological: Negative.  Negative for dizziness, weakness and light-headedness.   Hematological: Negative.    Psychiatric/Behavioral: Negative.   Negative for agitation, behavioral problems and confusion.        Objective    Vital Signs  Temp:  [97.2 °F (36.2 °C)-98.1 °F (36.7 °C)] 98.1 °F (36.7 °C)  Heart Rate:  [52-61] 58  Resp:  [16-18] 18  BP: (107-168)/() 107/30  SpO2:  [97 %-99 %] 97 %  on   ;   Device (Oxygen Therapy): room air  Body mass index is 28.8 kg/m².    Physical Exam  Vitals signs and nursing note reviewed.   Constitutional:       General: She is not in acute distress.     Appearance: She is normal weight.   HENT:      Head: Normocephalic and atraumatic.      Mouth/Throat:      Mouth: Mucous membranes are moist.   Eyes:      Extraocular Movements: Extraocular movements intact.   Neck:      Musculoskeletal: Normal range of motion and neck supple. No neck rigidity.   Cardiovascular:      Rate and Rhythm: Normal rate and regular rhythm.      Pulses: Normal pulses.      Heart sounds: Murmur present.   Pulmonary:      Effort: Respiratory distress present.      Breath sounds: Normal breath sounds.   Abdominal:      General: Abdomen is flat. Bowel sounds are normal. There is no distension.      Palpations: Abdomen is soft.   Musculoskeletal: Normal range of motion.         General: No swelling.   Skin:     General: Skin is warm and dry.   Neurological:      General: No focal deficit present.      Mental Status: She is alert. Mental status is at baseline.   Psychiatric:         Mood and Affect: Mood normal.         Behavior: Behavior normal.         Thought Content: Thought content normal.         Judgment: Judgment normal.         Results Review:   I reviewed the patient's new clinical results including all labs and xrays.    Lab Results (last 24 hours)     Procedure Component Value Units Date/Time    CBC & Differential [[id number]]  (Abnormal) Collected: 11/16/20 1516    Specimen: Blood Updated: 11/16/20 1524    Narrative:      The following orders were created for panel order CBC & Differential.  Procedure                                Abnormality         Status                     ---------                               -----------         ------                     CBC Auto Differential[[id number]]        Abnormal            Final result                 Please view results for these tests on the individual orders.    Comprehensive Metabolic Panel [[id number]]  (Abnormal) Collected: 11/16/20 1516    Specimen: Blood Updated: 11/16/20 1550     Glucose 105 mg/dL      BUN 25 mg/dL      Creatinine 1.23 mg/dL      Sodium 135 mmol/L      Potassium 4.6 mmol/L      Comment: Slight hemolysis detected by analyzer. Results may be affected.        Chloride 99 mmol/L      CO2 24.4 mmol/L      Calcium 10.0 mg/dL      Total Protein 7.4 g/dL      Albumin 4.60 g/dL      ALT (SGPT) 15 U/L      AST (SGOT) 19 U/L      Alkaline Phosphatase 60 U/L      Total Bilirubin 0.4 mg/dL      eGFR Non African Amer 42 mL/min/1.73      Globulin 2.8 gm/dL      A/G Ratio 1.6 g/dL      BUN/Creatinine Ratio 20.3     Anion Gap 11.6 mmol/L     Narrative:      GFR Normal >60  Chronic Kidney Disease <60  Kidney Failure <15      Troponin [[id number]]  (Normal) Collected: 11/16/20 1516    Specimen: Blood Updated: 11/16/20 1548     Troponin T <0.010 ng/mL     Narrative:      Troponin T Reference Range:  <= 0.03 ng/mL-   Negative for AMI  >0.03 ng/mL-     Abnormal for myocardial necrosis.  Clinicians would have to utilize clinical acumen, EKG, Troponin and serial changes to determine if it is an Acute Myocardial Infarction or myocardial injury due to an underlying chronic condition.       Results may be falsely decreased if patient taking Biotin.      BNP [[id number]]  (Normal) Collected: 11/16/20 1516    Specimen: Blood Updated: 11/16/20 1547     proBNP 254.6 pg/mL     Narrative:      Among patients with dyspnea, NT-proBNP is highly sensitive for the detection of acute congestive heart failure. In addition NT-proBNP of <300 pg/ml effectively rules out acute congestive heart failure with 99%  negative predictive value.    Results may be falsely decreased if patient taking Biotin.      CBC Auto Differential [[id number]]  (Abnormal) Collected: 11/16/20 1516    Specimen: Blood Updated: 11/16/20 1524     WBC 6.23 10*3/mm3      RBC 4.29 10*6/mm3      Hemoglobin 13.5 g/dL      Hematocrit 40.7 %      MCV 94.9 fL      MCH 31.5 pg      MCHC 33.2 g/dL      RDW 13.1 %      RDW-SD 45.1 fl      MPV 9.5 fL      Platelets 210 10*3/mm3      Neutrophil % 67.0 %      Lymphocyte % 13.6 %      Monocyte % 12.7 %      Eosinophil % 5.3 %      Basophil % 1.1 %      Immature Grans % 0.3 %      Neutrophils, Absolute 4.17 10*3/mm3      Lymphocytes, Absolute 0.85 10*3/mm3      Monocytes, Absolute 0.79 10*3/mm3      Eosinophils, Absolute 0.33 10*3/mm3      Basophils, Absolute 0.07 10*3/mm3      Immature Grans, Absolute 0.02 10*3/mm3      nRBC 0.0 /100 WBC     Magnesium [[id number]]  (Normal) Collected: 11/16/20 1516    Specimen: Blood Updated: 11/16/20 1610     Magnesium 2.3 mg/dL     TSH [[id number]]  (Normal) Collected: 11/16/20 1516    Specimen: Blood Updated: 11/16/20 2021     TSH 0.368 uIU/mL     Urinalysis With Microscopic If Indicated (No Culture) - Urine, Clean Catch [[id number]]  (Abnormal) Collected: 11/16/20 1614    Specimen: Urine, Clean Catch Updated: 11/16/20 1641     Color, UA Yellow     Appearance, UA Clear     pH, UA 7.5     Specific Gravity, UA 1.011     Glucose, UA Negative     Ketones, UA Negative     Bilirubin, UA Negative     Blood, UA Trace     Protein, UA Trace     Leuk Esterase, UA Negative     Nitrite, UA Negative     Urobilinogen, UA 0.2 E.U./dL    Urinalysis, Microscopic Only - Urine, Clean Catch [[id number]] Collected: 11/16/20 1614    Specimen: Urine, Clean Catch Updated: 11/16/20 1645     RBC, UA 0-2 /HPF      WBC, UA 0-2 /HPF      Bacteria, UA None Seen /HPF      Squamous Epithelial Cells, UA 0-2 /HPF      Hyaline Casts, UA None Seen /LPF      Methodology Automated Microscopy    Protime-INR [[id number]]   (Normal) Collected: 11/16/20 1834    Specimen: Blood Updated: 11/16/20 2002     Protime 13.3 Seconds      INR 1.03    aPTT [[id number]]  (Normal) Collected: 11/16/20 1834    Specimen: Blood Updated: 11/16/20 2002     PTT 32.3 seconds     COVID PRE-OP / PRE-PROCEDURE SCREENING ORDER (NO ISOLATION) - Swab, Nasopharynx [[id number]]  (Normal) Collected: 11/16/20 1944    Specimen: Swab from Nasopharynx Updated: 11/16/20 2114    Narrative:      The following orders were created for panel order COVID PRE-OP / PRE-PROCEDURE SCREENING ORDER (NO ISOLATION) - Swab, Nasopharynx.  Procedure                               Abnormality         Status                     ---------                               -----------         ------                     Respiratory Panel PCR w/...[[id number]]  Normal              Final result                 Please view results for these tests on the individual orders.    Respiratory Panel PCR w/COVID-19(SARS-CoV-2) [facility] In-House, NP Swab in UTM/VTM, 3-4 HR TAT - Swab, Nasopharynx [[id number]]  (Normal) Collected: 11/16/20 1944    Specimen: Swab from Nasopharynx Updated: 11/16/20 2114     ADENOVIRUS, PCR Not Detected     Coronavirus 229E Not Detected     Coronavirus HKU1 Not Detected     Coronavirus NL63 Not Detected     Coronavirus OC43 Not Detected     COVID19 Not Detected     Human Metapneumovirus Not Detected     Human Rhinovirus/Enterovirus Not Detected     Influenza A PCR Not Detected     Influenza B PCR Not Detected     Parainfluenza Virus 1 Not Detected     Parainfluenza Virus 2 Not Detected     Parainfluenza Virus 3 Not Detected     Parainfluenza Virus 4 Not Detected     RSV, PCR Not Detected     Bordetella pertussis pcr Not Detected     Bordetella parapertussis PCR Not Detected     Chlamydophila pneumoniae PCR Not Detected     Mycoplasma pneumo by PCR Not Detected    Narrative:      Fact sheet for providers:  https://docs.[employer]/wp-content/uploads/ORE2954-0825-YR7.1-EUA-Provider-Fact-Sheet-3.pdf    Fact sheet for patients: https://docs.[employer]/wp-content/uploads/XNV2611-0779-KY3.1-EUA-Patient-Fact-Sheet-1.pdf          CT Head Without Contrast   Final Result           1. Critical test result: Small right parafalcine subdural hematoma,   small bilateral anterior medial frontal lobe subarachnoid hemorrhage,   close follow-up recommended characterize change.   2. No acute cervical fracture is identified. Age-indeterminate   compression deformity of T2, correlate with location of pain.   Degenerative changes of the cervical spine. If there is further clinical   concern, MRI could be considered for further evaluation.       Discussed by telephone with Johnna Mendieta time of interpretation, 1717,   11/16/2020.               This report was finalized on 11/16/2020 5:19 PM by Dr. Joseluis Khan M.D.          CT Cervical Spine Without Contrast   Final Result           1. Critical test result: Small right parafalcine subdural hematoma,   small bilateral anterior medial frontal lobe subarachnoid hemorrhage,   close follow-up recommended characterize change.   2. No acute cervical fracture is identified. Age-indeterminate   compression deformity of T2, correlate with location of pain.   Degenerative changes of the cervical spine. If there is further clinical   concern, MRI could be considered for further evaluation.       Discussed by telephone with Johnna Mendieta time of interpretation, 1717,   11/16/2020.               This report was finalized on 11/16/2020 5:19 PM by Dr. Joseluis Khan M.D.          XR Chest 1 View   Final Result   No focal pulmonary consolidation. Cardiomegaly. Follow-up as   clinical indications persist.       This report was finalized on 11/16/2020 3:50 PM by Dr. Joseluis Khan M.D.            Assessment/Plan      Active Hospital Problems    Diagnosis  POA   • **SAH (subarachnoid  hemorrhage) (CMS/HCC) [I60.9]  Yes   • Syncope and collapse [R55]  Yes   • Subdural hematoma (CMS/HCC) [S06.5X9A]  Yes   • Hypothyroidism [E03.9]  Yes   • GERD (gastroesophageal reflux disease) [K21.9]  Yes   • Fall [W19.XXXA]  Yes   • Stage 3 chronic kidney disease [N18.30]  Yes   • Hyperlipidemia [E78.5]  Yes   • Essential hypertension [I10]  Yes      Resolved Hospital Problems   No resolved problems to display.     SAH/SDH  -s/p syncopal event today, CT showing small SAD/SDH, on no anticoagulation   -EVELIN consult  -was on cardene gtt to keep SBP<160, though stopped now for hypotension, will restart if necessary  -neuro checks    Syncope  -likely vasovagal given that this happened while she was in the middle of her mammogram, though she has passed out in the past and does not see cardiology  -will order echo for AM  -cardiology consult    CKD3  -baseline renal function tonight  -avoid further nephrotoxins  -recheck labs in AM    HLD/HTN  -hypotensive as per above, but will continue to monitor BP per EVELIN recs  -may continue home meds once verified by RN    VTE Ppx  -SCDs    CODE status  -full    I discussed the patients findings and my recommendations with patient.    JUNE Mathis  [facility]  11/16/20  6:39 PM EST

## 2020-11-16 NOTE — ED NOTES
Pt noted to have mask on when this RN entered the room. This RN wore appropriate PPE throughout our encounter. Hand hygiene performed upon entering and exiting room.       Neda Cruz, RN  11/16/20 [id number]

## 2020-11-16 NOTE — ED PROVIDER NOTES
MD ATTESTATION NOTE  Patient was placed in face mask in first look and the following protective measures were taken unless additional measures were taken and documented below in the ED course. Patient was wearing facemask when I entered the room and throughout our encounter. I wore full protective equipment throughout this patient encounter including a face mask, and gloves. Hand hygiene was performed before donning protective equipment and after removal when leaving the room.    The JOHN and I have discussed this patient's history, physical exam, and treatment plan. I have reviewed the documentation and personally had a face to face interaction with the patient. I affirm the JOHN documentation and agree with their diagnostics, findings, treatment, plan, and disposition.  The attached note describes my personal findings.    Sylvia Stover is a 80 y.o. female who presents to the ED c/o syncope. Patient was at a mammogram appointment when she lost conscousness. She is unsure exactly what was happening prior to losing conscouisness but believes she was having the procedure done. She denies any prodrome, she woke up on the floor. Per report of staff, patient fell backwards and hit her head on the floor.  No reported seizure-like activity.  Patient did bite anterior portion of her tongue, denies any incontinence of bowel or bladder.  Patient complains of a minor posterior headache, dull, no neck pain, no radicular pain, no weakness or numbness.  Patient was confused when she first woke up but symptoms lasted for a minute or 2, was not postictal.  Patient denies any other injuries, no other complaints.  Patient denies any associated chest pain, shortness of breath, palpitations.  Patient reports no recent vomiting or diarrhea, has been eating and drinking normally.  Patient reports that she has passed out several times in the past, can remember passing out specifically with blood draws.    On exam:  General: NAD  Head:  Contusion on occiput, no crepitus or deformity, no raccoon eyes, no he sign, no facial anesthesia  ENT: Extraocular motion intact, pupils equal and round reactive to light, moist mucous membranes  Neck: Supple, trachea midline. Cervical spine: No step-offs or deformities, no midline tenderness, full range of motion.  Cardiac: regular rate and rhythm  Lungs: Clear to auscultation bilaterally  Abdomen: Soft, nontender, no rebound tenderness/guarding/rigidity  : Deferred to JOHN  Extremities: Moves all extremities well, no peripheral edema  Neuro: Alert and oriented x3, extraocular motion intact, pupils are equal and round reactive to light, cranial nerves II through XII are grossly intact, normal speech, moves all extremities well, 5 out of 5 strength all 4 extremities, sensation intact light touch all 4 extremities, no ataxia.  Skin: Warm, dry    LAB RESULTS  Recent Results (from the past 24 hour(s))   ECG 12 Lead    Collection Time: 11/16/20  3:15 PM   Result Value Ref Range    QT Interval 453 ms   Comprehensive Metabolic Panel    Collection Time: 11/16/20  3:16 PM    Specimen: Blood   Result Value Ref Range    Glucose 105 (H) 65 - 99 mg/dL    BUN 25 (H) 8 - 23 mg/dL    Creatinine 1.23 (H) 0.57 - 1.00 mg/dL    Sodium 135 (L) 136 - 145 mmol/L    Potassium 4.6 3.5 - 5.2 mmol/L    Chloride 99 98 - 107 mmol/L    CO2 24.4 22.0 - 29.0 mmol/L    Calcium 10.0 8.6 - 10.5 mg/dL    Total Protein 7.4 6.0 - 8.5 g/dL    Albumin 4.60 3.50 - 5.20 g/dL    ALT (SGPT) 15 1 - 33 U/L    AST (SGOT) 19 1 - 32 U/L    Alkaline Phosphatase 60 39 - 117 U/L    Total Bilirubin 0.4 0.0 - 1.2 mg/dL    eGFR Non African Amer 42 (L) >60 mL/min/1.73    Globulin 2.8 gm/dL    A/G Ratio 1.6 g/dL    BUN/Creatinine Ratio 20.3 7.0 - 25.0    Anion Gap 11.6 5.0 - 15.0 mmol/L   Troponin    Collection Time: 11/16/20  3:16 PM    Specimen: Blood   Result Value Ref Range    Troponin T <0.010 0.000 - 0.030 ng/mL   BNP    Collection Time: 11/16/20  3:16 PM     Specimen: Blood   Result Value Ref Range    proBNP 254.6 0.0-1,800.0 pg/mL   CBC Auto Differential    Collection Time: 11/16/20  3:16 PM    Specimen: Blood   Result Value Ref Range    WBC 6.23 3.40 - 10.80 10*3/mm3    RBC 4.29 3.77 - 5.28 10*6/mm3    Hemoglobin 13.5 12.0 - 15.9 g/dL    Hematocrit 40.7 34.0 - 46.6 %    MCV 94.9 79.0 - 97.0 fL    MCH 31.5 26.6 - 33.0 pg    MCHC 33.2 31.5 - 35.7 g/dL    RDW 13.1 12.3 - 15.4 %    RDW-SD 45.1 37.0 - 54.0 fl    MPV 9.5 6.0 - 12.0 fL    Platelets 210 140 - 450 10*3/mm3    Neutrophil % 67.0 42.7 - 76.0 %    Lymphocyte % 13.6 (L) 19.6 - 45.3 %    Monocyte % 12.7 (H) 5.0 - 12.0 %    Eosinophil % 5.3 0.3 - 6.2 %    Basophil % 1.1 0.0 - 1.5 %    Immature Grans % 0.3 0.0 - 0.5 %    Neutrophils, Absolute 4.17 1.70 - 7.00 10*3/mm3    Lymphocytes, Absolute 0.85 0.70 - 3.10 10*3/mm3    Monocytes, Absolute 0.79 0.10 - 0.90 10*3/mm3    Eosinophils, Absolute 0.33 0.00 - 0.40 10*3/mm3    Basophils, Absolute 0.07 0.00 - 0.20 10*3/mm3    Immature Grans, Absolute 0.02 0.00 - 0.05 10*3/mm3    nRBC 0.0 0.0 - 0.2 /100 WBC   Magnesium    Collection Time: 11/16/20  3:16 PM    Specimen: Blood   Result Value Ref Range    Magnesium 2.3 1.6 - 2.4 mg/dL   Urinalysis With Microscopic If Indicated (No Culture) - Urine, Clean Catch    Collection Time: 11/16/20  4:14 PM    Specimen: Urine, Clean Catch   Result Value Ref Range    Color, UA Yellow Yellow, Straw    Appearance, UA Clear Clear    pH, UA 7.5 5.0 - 8.0    Specific Gravity, UA 1.011 1.005 - 1.030    Glucose, UA Negative Negative    Ketones, UA Negative Negative    Bilirubin, UA Negative Negative    Blood, UA Trace (A) Negative    Protein, UA Trace (A) Negative    Leuk Esterase, UA Negative Negative    Nitrite, UA Negative Negative    Urobilinogen, UA 0.2 E.U./dL 0.2 - 1.0 E.U./dL   Urinalysis, Microscopic Only - Urine, Clean Catch    Collection Time: 11/16/20  4:14 PM    Specimen: Urine, Clean Catch   Result Value Ref Range    RBC, UA 0-2 None  Seen, 0-2 /HPF    WBC, UA 0-2 None Seen, 0-2 /HPF    Bacteria, UA None Seen None Seen /HPF    Squamous Epithelial Cells, UA 0-2 None Seen, 0-2 /HPF    Hyaline Casts, UA None Seen None Seen /LPF    Methodology Automated Microscopy        I ordered the above labs and reviewed the results.    RADIOLOGY  Ct Head Without Contrast    Result Date: 11/16/2020  CT HEAD WO CONTRAST-, CT CERVICAL SPINE WO CONTRAST-  INDICATIONS: Trauma    TECHNIQUE: Radiation dose reduction techniques were utilized, including automated exposure control and exposure modulation based on body size. Noncontrast head CT, cervical spine CT  COMPARISON: Head CT from 04/13/2016  FINDINGS:  Head CT:  Right parafalcine subdural hematoma is apparent, about 2 mm thickness, for example axial image 40, and small subarachnoid hemorrhage is apparent anterior medially at the frontal lobes, close follow-up advised to characterize change.    No midline shift or mass effect. No acute territorial infarct is identified. Bilateral basal ganglia mineralization is present.  Moderate periventricular hypodensities suggest chronic small vessel ischemic change in a patient this age.  Arterial calcifications are seen at the base of the brain.  Ventricles, cisterns, cerebral sulci are otherwise unremarkable for patient age.  The visualized paranasal sinuses, orbits, mastoid air cells are unremarkable. Subcutaneous scalp hematoma is seen posteriorly on the right.     Cervical spine CT:   Mild anterolisthesis of C4 on C5, 2-3 mm. Mild retrolisthesis of C4 on C5, 1-2 mm.  Uncovertebral joint and facet hypertrophy result in bilateral neuroforaminal narrowing, more prominent on the right C4/5, C5/6, C6/7, and on the left at C5/6.  Disc osteophyte complex results in mild central stenosis at C4/5, C5/6, C6/7.  About 25% anterior wedging of T2 vertebral body is age-indeterminate, correlate with location of pain. No other evidence of fracture is identified.   Bilateral cervical  carotid arterial calcifications are present.          1. Critical test result: Small right parafalcine subdural hematoma, small bilateral anterior medial frontal lobe subarachnoid hemorrhage, close follow-up recommended characterize change. 2. No acute cervical fracture is identified. Age-indeterminate compression deformity of T2, correlate with location of pain. Degenerative changes of the cervical spine. If there is further clinical concern, MRI could be considered for further evaluation.  Discussed by telephone with Johnna Mendieta time of interpretation, 1717, 11/16/2020.    This report was finalized on 11/16/2020 5:19 PM by Dr. Joseluis Khan M.D.      Ct Cervical Spine Without Contrast    Result Date: 11/16/2020  CT HEAD WO CONTRAST-, CT CERVICAL SPINE WO CONTRAST-  INDICATIONS: Trauma    TECHNIQUE: Radiation dose reduction techniques were utilized, including automated exposure control and exposure modulation based on body size. Noncontrast head CT, cervical spine CT  COMPARISON: Head CT from 04/13/2016  FINDINGS:  Head CT:  Right parafalcine subdural hematoma is apparent, about 2 mm thickness, for example axial image 40, and small subarachnoid hemorrhage is apparent anterior medially at the frontal lobes, close follow-up advised to characterize change.    No midline shift or mass effect. No acute territorial infarct is identified. Bilateral basal ganglia mineralization is present.  Moderate periventricular hypodensities suggest chronic small vessel ischemic change in a patient this age.  Arterial calcifications are seen at the base of the brain.  Ventricles, cisterns, cerebral sulci are otherwise unremarkable for patient age.  The visualized paranasal sinuses, orbits, mastoid air cells are unremarkable. Subcutaneous scalp hematoma is seen posteriorly on the right.     Cervical spine CT:   Mild anterolisthesis of C4 on C5, 2-3 mm. Mild retrolisthesis of C4 on C5, 1-2 mm.  Uncovertebral joint and facet  hypertrophy result in bilateral neuroforaminal narrowing, more prominent on the right C4/5, C5/6, C6/7, and on the left at C5/6.  Disc osteophyte complex results in mild central stenosis at C4/5, C5/6, C6/7.  About 25% anterior wedging of T2 vertebral body is age-indeterminate, correlate with location of pain. No other evidence of fracture is identified.   Bilateral cervical carotid arterial calcifications are present.          1. Critical test result: Small right parafalcine subdural hematoma, small bilateral anterior medial frontal lobe subarachnoid hemorrhage, close follow-up recommended characterize change. 2. No acute cervical fracture is identified. Age-indeterminate compression deformity of T2, correlate with location of pain. Degenerative changes of the cervical spine. If there is further clinical concern, MRI could be considered for further evaluation.  Discussed by telephone with Johnna Mendieta time of interpretation, 1717, 11/16/2020.    This report was finalized on 11/16/2020 5:19 PM by Dr. Joseluis Khan M.D.      Xr Chest 1 View    Result Date: 11/16/2020  XR CHEST 1 VW-  HISTORY: Female who is 80 years-old,  syncope  TECHNIQUE: Frontal view of the chest  COMPARISON: 04/19/2014  FINDINGS: The heart appears mildly enlarged. Aorta is calcified. Pulmonary vasculature is unremarkable. No focal pulmonary consolidation, pleural effusion, or pneumothorax. Hiatal hernia is apparent. No acute osseous process.      No focal pulmonary consolidation. Cardiomegaly. Follow-up as clinical indications persist.  This report was finalized on 11/16/2020 3:50 PM by Dr. Joseluis Khan M.D.        I ordered the above noted radiological studies. I reviewed the images and results. I agree with the radiologist interpretation.    PROCEDURES  Procedures    MEDICATIONS GIVEN IN ER  Medications   sodium chloride 0.9 % bolus 1,000 mL (has no administration in time range)   niCARdipine (CARDENE) 25 mg in 250 mL NS infusion  kit (has no administration in time range)       PROGRESS, DATA ANALYSIS, CONSULTS, AND MEDICAL DECISION MAKING  A complete history and physical exam have been performed.  All available laboratory and imaging results have been reviewed by myself prior to disposition.  Face mask and gloves were worn throughout the patient encounter, unless additional PPE was worn and specified below. Hand hygiene was performed before entering and after leaving the patient room.  [facility]    ED Course as of Nov 18 1835   Mon Nov 16, 2020   1559 EKG Independently viewed and contemporaneously interpreted by ED physician  Time: 1515. HR 54. Interpretation: Sinus bradycardia, normal axis, NQRS, no acute ST changes, significant artifact present        [JG]   1714 I rechecked patient informed patient and daughter about results. Waiting on CT head and cspine results from radiology. Offered admission due to her syncopal episode with no prodrome.  Pt's neuro exam is still normal. Daughter and patient would like her to walk around the emergency room to see how she feels.  We will do a road test and wait for CT results to determine dispo.    [SS]   1719 I have discussed with Dr. Khan, radiology regarding patient's CT results.  He notes a small subdural hematoma to the falx cerebri that is approximately 2 mm with a subarachnoid hemorrhage to his anterior medial frontal lobes.  Will contact neurosurgery at this time.    [SS]   1720 I rechecked patient patient patient's blood pressure is now 104/83 and the head of the bed is elevated to approximately 45 degrees.  Waiting on neurosurgery call at this time.    [SS]   1726 CT imaging significant for subdural hemorrhage and subarachnoid hemorrhage.  Patient had normal neuro exam upon my evaluation, JOHN reevaluating.  Patient was sitting upright, head of the bed elevated.  Obtain repeat blood pressure on patient, will use nicardipine drip if needed for blood pressure control.  Immediately consulting  neurosurgery.    [JG]   1728 Obtaining coagulation factors, patient will require admission, likely to the ICU, obtaining rapid Covid swab.    [JG]   1734 Patient reassessed, continues to be normal neuro exam.    [JG]   1737 Phone call with Dr Frye.  Discussed the patient, relevant history, exam, diagnostics, ED findings/progress, and concerns.  He states no blood pressure control as long as patient remains under 160 systolic, if blood sugar pressure becomes elevated, recommends nicardipine drip.  He request admission to hospitalist.  He states patient does not need ICU, request admission to telemetry floor.    [JG]   1745 Phone call with Dr Alfredo.  Discussed the patient, relevant history, exam, diagnostics, ED findings/progress, and concerns. They agree to admit the patient to tele. Care assumed by the admitting physician at this time.        [JG]   1753 Patient reassessed.  Discussed ED findings, differential diagnosis, and the need for admission for evaluation/treatment.  They are agreeable to admission and all questions were answered.        [JG]   1802 I rechecked patient and patient is still resting comfortably in bed.  Her blood pressure is 168/104 at this time.  Neurologically intact.  Informed patient and daughter the plan for admission.  They expressed understanding and all questions.    [SS]      ED Course User Index  [JG] Jerry Andrade MD  [SS] Johnna Mendieta PA-C       AS OF 17:54 EST VITALS:    BP - (!) 153/114  HR - 52  TEMP - 97.2 °F (36.2 °C) (Tympanic)  O2 SATS - 98%    DIAGNOSIS  Final diagnoses:   SAH (subarachnoid hemorrhage) (CMS/[facility])   SDH (subdural hematoma) (CMS/[facility])   Closed head injury, initial encounter   Syncope, unspecified syncope type   Chronic kidney disease, unspecified CKD stage     Critical care:  Total critical care time of 35 minutes is exclusive of any other billable procedures and includes time spent with direct patient care and observation, retrospective chart  review, management of acute condition, and consultation with other physicians.      DISPOSITION  ADMISSION    Discussed treatment plan and reason for admission with pt/family and admitting physician.  Pt/family voiced understanding of the plan for admission for further testing/treatment as needed.          Jerry Andrade MD  11/16/20 [id number]       Jerry Andrade MD  11/16/20 2354       Jerry Andrade MD  11/18/20 9746

## 2020-11-17 ENCOUNTER — APPOINTMENT (OUTPATIENT)
Dept: CARDIOLOGY | Facility: HOSPITAL | Age: 80
End: 2020-11-17

## 2020-11-17 ENCOUNTER — APPOINTMENT (OUTPATIENT)
Dept: CT IMAGING | Facility: HOSPITAL | Age: 80
End: 2020-11-17

## 2020-11-17 ENCOUNTER — APPOINTMENT (OUTPATIENT)
Dept: GENERAL RADIOLOGY | Facility: HOSPITAL | Age: 80
End: 2020-11-17

## 2020-11-17 LAB
ANION GAP SERPL CALCULATED.3IONS-SCNC: 7.5 MMOL/L (ref 5–15)
AORTIC ARCH: 2.7 CM
AORTIC DIMENSIONLESS INDEX: 0.6 (DI)
ASCENDING AORTA: 2.8 CM
BH CV ECHO MEAS - ACS: 2.1 CM
BH CV ECHO MEAS - AI DEC SLOPE: 86.3 CM/SEC^2
BH CV ECHO MEAS - AI MAX PG: 54.2 MMHG
BH CV ECHO MEAS - AI MAX VEL: 368 CM/SEC
BH CV ECHO MEAS - AI P1/2T: 1249 MSEC
BH CV ECHO MEAS - AO ARCH DIAM (PROXIMAL TRANS.): 2.7 CM
BH CV ECHO MEAS - AO MAX PG: 11 MMHG
BH CV ECHO MEAS - AO MEAN PG (FULL): 3 MMHG
BH CV ECHO MEAS - AO MEAN PG: 6 MMHG
BH CV ECHO MEAS - AO ROOT AREA (BSA CORRECTED): 2
BH CV ECHO MEAS - AO ROOT AREA: 9.1 CM^2
BH CV ECHO MEAS - AO ROOT DIAM: 3.4 CM
BH CV ECHO MEAS - AO V2 MAX: 164 CM/SEC
BH CV ECHO MEAS - AO V2 MEAN: 118 CM/SEC
BH CV ECHO MEAS - AO V2 VTI: 38.5 CM
BH CV ECHO MEAS - ASC AORTA: 2.8 CM
BH CV ECHO MEAS - AVA(I,A): 1.9 CM^2
BH CV ECHO MEAS - AVA(I,D): 1.9 CM^2
BH CV ECHO MEAS - BSA(HAYCOCK): 1.7 M^2
BH CV ECHO MEAS - BSA: 1.7 M^2
BH CV ECHO MEAS - BZI_BMI: 28.7 KILOGRAMS/M^2
BH CV ECHO MEAS - BZI_METRIC_HEIGHT: 154.9 CM
BH CV ECHO MEAS - BZI_METRIC_WEIGHT: 68.9 KG
BH CV ECHO MEAS - EDV(CUBED): 59.3 ML
BH CV ECHO MEAS - EDV(MOD-SP2): 120 ML
BH CV ECHO MEAS - EDV(MOD-SP4): 138 ML
BH CV ECHO MEAS - EDV(TEICH): 65.9 ML
BH CV ECHO MEAS - EF(CUBED): 73.7 %
BH CV ECHO MEAS - EF(MOD-BP): 70 %
BH CV ECHO MEAS - EF(MOD-SP2): 69.2 %
BH CV ECHO MEAS - EF(MOD-SP4): 69.6 %
BH CV ECHO MEAS - EF(TEICH): 66.1 %
BH CV ECHO MEAS - ESV(CUBED): 15.6 ML
BH CV ECHO MEAS - ESV(MOD-SP2): 37 ML
BH CV ECHO MEAS - ESV(MOD-SP4): 42 ML
BH CV ECHO MEAS - ESV(TEICH): 22.3 ML
BH CV ECHO MEAS - FS: 35.9 %
BH CV ECHO MEAS - IVS/LVPW: 0.92
BH CV ECHO MEAS - IVSD: 1.2 CM
BH CV ECHO MEAS - LA DIMENSION: 3.2 CM
BH CV ECHO MEAS - LA/AO: 0.94
BH CV ECHO MEAS - LAT PEAK E' VEL: 8.4 CM/SEC
BH CV ECHO MEAS - LV DIASTOLIC VOL/BSA (35-75): 82.1 ML/M^2
BH CV ECHO MEAS - LV MASS(C)D: 169.4 GRAMS
BH CV ECHO MEAS - LV MASS(C)DI: 100.7 GRAMS/M^2
BH CV ECHO MEAS - LV MEAN PG: 3 MMHG
BH CV ECHO MEAS - LV SYSTOLIC VOL/BSA (12-30): 25 ML/M^2
BH CV ECHO MEAS - LV V1 MAX: 108 CM/SEC
BH CV ECHO MEAS - LV V1 MEAN: 74 CM/SEC
BH CV ECHO MEAS - LV V1 VTI: 23.8 CM
BH CV ECHO MEAS - LVIDD: 3.9 CM
BH CV ECHO MEAS - LVIDS: 2.5 CM
BH CV ECHO MEAS - LVLD AP2: 7 CM
BH CV ECHO MEAS - LVLD AP4: 7.5 CM
BH CV ECHO MEAS - LVLS AP2: 5.4 CM
BH CV ECHO MEAS - LVLS AP4: 5.5 CM
BH CV ECHO MEAS - LVOT AREA (M): 3.1 CM^2
BH CV ECHO MEAS - LVOT AREA: 3.1 CM^2
BH CV ECHO MEAS - LVOT DIAM: 2 CM
BH CV ECHO MEAS - LVPWD: 1.3 CM
BH CV ECHO MEAS - MED PEAK E' VEL: 5.8 CM/SEC
BH CV ECHO MEAS - MV A DUR: 0.24 SEC
BH CV ECHO MEAS - MV A MAX VEL: 72.1 CM/SEC
BH CV ECHO MEAS - MV DEC SLOPE: 410 CM/SEC^2
BH CV ECHO MEAS - MV DEC TIME: 0.18 SEC
BH CV ECHO MEAS - MV E MAX VEL: 72.1 CM/SEC
BH CV ECHO MEAS - MV E/A: 1
BH CV ECHO MEAS - MV MEAN PG: 1 MMHG
BH CV ECHO MEAS - MV P1/2T MAX VEL: 97.7 CM/SEC
BH CV ECHO MEAS - MV P1/2T: 69.8 MSEC
BH CV ECHO MEAS - MV V2 MEAN: 48.8 CM/SEC
BH CV ECHO MEAS - MV V2 VTI: 36.9 CM
BH CV ECHO MEAS - MVA P1/2T LCG: 2.3 CM^2
BH CV ECHO MEAS - MVA(P1/2T): 3.2 CM^2
BH CV ECHO MEAS - MVA(VTI): 2 CM^2
BH CV ECHO MEAS - PA ACC SLOPE: 1065 CM/SEC^2
BH CV ECHO MEAS - PA ACC TIME: 0.07 SEC
BH CV ECHO MEAS - PA MAX PG (FULL): 1.6 MMHG
BH CV ECHO MEAS - PA MAX PG: 2.3 MMHG
BH CV ECHO MEAS - PA PR(ACCEL): 45.7 MMHG
BH CV ECHO MEAS - PA V2 MAX: 76 CM/SEC
BH CV ECHO MEAS - PI END-D VEL: 39.4 CM/SEC
BH CV ECHO MEAS - PULM A REVS DUR: 0.21 SEC
BH CV ECHO MEAS - PULM A REVS VEL: 32.6 CM/SEC
BH CV ECHO MEAS - PULM DIAS VEL: 47.9 CM/SEC
BH CV ECHO MEAS - PULM S/D: 1.2
BH CV ECHO MEAS - PULM SYS VEL: 58.2 CM/SEC
BH CV ECHO MEAS - PVA(V,A): 2.3 CM^2
BH CV ECHO MEAS - PVA(V,D): 2.3 CM^2
BH CV ECHO MEAS - QP/QS: 0.69
BH CV ECHO MEAS - RAP SYSTOLE: 3 MMHG
BH CV ECHO MEAS - RV MAX PG: 0.74 MMHG
BH CV ECHO MEAS - RV MEAN PG: 0 MMHG
BH CV ECHO MEAS - RV V1 MAX: 42.9 CM/SEC
BH CV ECHO MEAS - RV V1 MEAN: 31.6 CM/SEC
BH CV ECHO MEAS - RV V1 VTI: 12.5 CM
BH CV ECHO MEAS - RVOT AREA: 4.2 CM^2
BH CV ECHO MEAS - RVOT DIAM: 2.3 CM
BH CV ECHO MEAS - RVSP: 31.5 MMHG
BH CV ECHO MEAS - SI(AO): 207.9 ML/M^2
BH CV ECHO MEAS - SI(CUBED): 26 ML/M^2
BH CV ECHO MEAS - SI(LVOT): 44.5 ML/M^2
BH CV ECHO MEAS - SI(MOD-SP2): 49.4 ML/M^2
BH CV ECHO MEAS - SI(MOD-SP4): 57.1 ML/M^2
BH CV ECHO MEAS - SI(TEICH): 25.9 ML/M^2
BH CV ECHO MEAS - SV(AO): 349.5 ML
BH CV ECHO MEAS - SV(CUBED): 43.7 ML
BH CV ECHO MEAS - SV(LVOT): 74.8 ML
BH CV ECHO MEAS - SV(MOD-SP2): 83 ML
BH CV ECHO MEAS - SV(MOD-SP4): 96 ML
BH CV ECHO MEAS - SV(RVOT): 51.9 ML
BH CV ECHO MEAS - SV(TEICH): 43.6 ML
BH CV ECHO MEAS - TAPSE (>1.6): 2.2 CM
BH CV ECHO MEAS - TR MAX VEL: 267 CM/SEC
BH CV ECHO MEASUREMENTS AVERAGE E/E' RATIO: 10.15
BH CV XLRA - RV BASE: 3.3 CM
BH CV XLRA - RV LENGTH: 6.2 CM
BH CV XLRA - RV MID: 2.7 CM
BH CV XLRA - TDI S': 14.3 CM/SEC
BUN SERPL-MCNC: 19 MG/DL (ref 8–23)
BUN/CREAT SERPL: 19.4 (ref 7–25)
CALCIUM SPEC-SCNC: 8.9 MG/DL (ref 8.6–10.5)
CHLORIDE SERPL-SCNC: 103 MMOL/L (ref 98–107)
CO2 SERPL-SCNC: 24.5 MMOL/L (ref 22–29)
CREAT SERPL-MCNC: 0.98 MG/DL (ref 0.57–1)
DEPRECATED RDW RBC AUTO: 41.9 FL (ref 37–54)
ERYTHROCYTE [DISTWIDTH] IN BLOOD BY AUTOMATED COUNT: 12.8 % (ref 12.3–15.4)
GFR SERPL CREATININE-BSD FRML MDRD: 55 ML/MIN/1.73
GLUCOSE SERPL-MCNC: 100 MG/DL (ref 65–99)
HCT VFR BLD AUTO: 35.2 % (ref 34–46.6)
HGB BLD-MCNC: 12 G/DL (ref 12–15.9)
LEFT ATRIUM VOLUME INDEX: 54 ML/M2
LV EF 2D ECHO EST: 70 %
MAXIMAL PREDICTED HEART RATE: 141 BPM
MCH RBC QN AUTO: 30.8 PG (ref 26.6–33)
MCHC RBC AUTO-ENTMCNC: 34.1 G/DL (ref 31.5–35.7)
MCV RBC AUTO: 90.5 FL (ref 79–97)
PLATELET # BLD AUTO: 201 10*3/MM3 (ref 140–450)
PMV BLD AUTO: 9.1 FL (ref 6–12)
POTASSIUM SERPL-SCNC: 4.3 MMOL/L (ref 3.5–5.2)
RBC # BLD AUTO: 3.89 10*6/MM3 (ref 3.77–5.28)
SINUS: 3.3 CM
SODIUM SERPL-SCNC: 135 MMOL/L (ref 136–145)
STJ: 3 CM
STRESS TARGET HR: 120 BPM
WBC # BLD AUTO: 5.4 10*3/MM3 (ref 3.4–10.8)

## 2020-11-17 PROCEDURE — 63710000001 ONDANSETRON ODT 4 MG TABLET DISPERSIBLE: Performed by: HOSPITALIST

## 2020-11-17 PROCEDURE — 25010000002 PERFLUTREN (DEFINITY) 8.476 MG IN SODIUM CHLORIDE (PF) 0.9 % 10 ML INJECTION: Performed by: NURSE PRACTITIONER

## 2020-11-17 PROCEDURE — 36415 COLL VENOUS BLD VENIPUNCTURE: CPT | Performed by: NURSE PRACTITIONER

## 2020-11-17 PROCEDURE — 70450 CT HEAD/BRAIN W/O DYE: CPT

## 2020-11-17 PROCEDURE — 99222 1ST HOSP IP/OBS MODERATE 55: CPT | Performed by: NURSE PRACTITIONER

## 2020-11-17 PROCEDURE — 72170 X-RAY EXAM OF PELVIS: CPT

## 2020-11-17 PROCEDURE — 99223 1ST HOSP IP/OBS HIGH 75: CPT | Performed by: NURSE PRACTITIONER

## 2020-11-17 PROCEDURE — 93306 TTE W/DOPPLER COMPLETE: CPT

## 2020-11-17 PROCEDURE — 93306 TTE W/DOPPLER COMPLETE: CPT | Performed by: INTERNAL MEDICINE

## 2020-11-17 PROCEDURE — 80048 BASIC METABOLIC PNL TOTAL CA: CPT | Performed by: NURSE PRACTITIONER

## 2020-11-17 PROCEDURE — 85027 COMPLETE CBC AUTOMATED: CPT | Performed by: NURSE PRACTITIONER

## 2020-11-17 RX ORDER — METOPROLOL TARTRATE 50 MG/1
50 TABLET, FILM COATED ORAL EVERY 12 HOURS SCHEDULED
Status: DISCONTINUED | OUTPATIENT
Start: 2020-11-17 | End: 2020-11-18

## 2020-11-17 RX ADMIN — ATORVASTATIN CALCIUM 10 MG: 20 TABLET, FILM COATED ORAL at 20:43

## 2020-11-17 RX ADMIN — ONDANSETRON 4 MG: 4 TABLET, ORALLY DISINTEGRATING ORAL at 05:12

## 2020-11-17 RX ADMIN — METOPROLOL TARTRATE 75 MG: 25 TABLET, FILM COATED ORAL at 08:12

## 2020-11-17 RX ADMIN — FERROUS SULFATE TAB 325 MG (65 MG ELEMENTAL FE) 325 MG: 325 (65 FE) TAB at 08:11

## 2020-11-17 RX ADMIN — FAMOTIDINE 20 MG: 20 TABLET, FILM COATED ORAL at 06:31

## 2020-11-17 RX ADMIN — AMLODIPINE BESYLATE 2.5 MG: 2.5 TABLET ORAL at 08:12

## 2020-11-17 RX ADMIN — METOPROLOL TARTRATE 75 MG: 25 TABLET, FILM COATED ORAL at 00:09

## 2020-11-17 RX ADMIN — FAMOTIDINE 20 MG: 20 TABLET, FILM COATED ORAL at 16:47

## 2020-11-17 RX ADMIN — LISINOPRIL 20 MG: 20 TABLET ORAL at 00:13

## 2020-11-17 RX ADMIN — PERFLUTREN 2 ML: 6.52 INJECTION, SUSPENSION INTRAVENOUS at 10:45

## 2020-11-17 RX ADMIN — LEVOTHYROXINE SODIUM 100 MCG: 100 TABLET ORAL at 06:31

## 2020-11-17 RX ADMIN — SODIUM CHLORIDE, PRESERVATIVE FREE 10 ML: 5 INJECTION INTRAVENOUS at 08:13

## 2020-11-17 RX ADMIN — LISINOPRIL 20 MG: 20 TABLET ORAL at 08:11

## 2020-11-17 NOTE — PLAN OF CARE
"Goal Outcome Evaluation:  Plan of Care Reviewed With: patient  Progress: no change  Patient admitted to this unit this shift at 2040 from ED after witnessed syncopal episode with fall and head impact.  Subarachnoid hemorrhage and subdural hematoma.  Patient is alert and oriented, pleasant and cooperative. Patient had trouble sleeping this shift, was awake most of the night.  Denies pain, states head is \"only a little sore, not painful.\"  Continuous pulse oximetry, ran consistently bradycardic in 50s with occasional brief dips into 40s.  C/o nausea after returning from early a.m. CT scan. PRN zofran given at 0512.  Will continue to monitor.  "

## 2020-11-17 NOTE — PROGRESS NOTES
Name: Sylvia Stover ADMIT: 2020   : 1940  PCP: Rhoda Luna APRN    MRN: [id number] LOS: 1 days   AGE/SEX: 80 y.o. female  ROOM: [facility]   Subjective   Chief Complaint   Patient presents with   • Altered Mental Status   • Fall      No HA or vision change. No CP or palpitations. No SOA or productive cough. Had nausea earlier but is now improved. No abdominal pain. She reports chronic bradycardia due to medications but no prior issues with it. Also reporting tailbone and some right hip pain. Pain is moderate. Non radiating. As for her syncope. She does report the first breast mammogram was painful and that she was getting in position for the second film when she lost consciousness. Other than the painful test, she does not recall any prodrome.    Objective   Vital Signs  Temp:  [97.2 °F (36.2 °C)-98.6 °F (37 °C)] 98.6 °F (37 °C)  Heart Rate:  [47-61] 52  Resp:  [16-18] 16  BP: (102-168)/() 110/79  SpO2:  [92 %-99 %] 92 %  on   ;   Device (Oxygen Therapy): room air  Body mass index is 28.72 kg/m².    Physical Exam  Vitals signs and nursing note reviewed.   Constitutional:       General: She is not in acute distress.  HENT:      Nose: Nose normal.      Mouth/Throat:      Pharynx: Oropharynx is clear.   Eyes:      Extraocular Movements: Extraocular movements intact.      Pupils: Pupils are equal, round, and reactive to light.   Neck:      Musculoskeletal: Neck supple. No muscular tenderness.   Cardiovascular:      Rate and Rhythm: Regular rhythm. Bradycardia present.      Pulses: Normal pulses.   Pulmonary:      Effort: Pulmonary effort is normal.      Breath sounds: No wheezing.   Abdominal:      General: There is no distension.      Palpations: Abdomen is soft.      Tenderness: There is no abdominal tenderness. There is no guarding or rebound.   Musculoskeletal:         General: Tenderness present. No swelling.   Skin:     General: Skin is warm and dry.   Neurological:      Mental Status: She  is alert.      Cranial Nerves: No cranial nerve deficit.   Psychiatric:         Mood and Affect: Mood normal.         Behavior: Behavior normal.         Results Review:       I reviewed the patient's new clinical results.     I reviewed imaging, agree with interpretation.     I reviewed telemetry/EKG results, sinus bradycardia.    Results from last 7 days   Lab Units 11/17/20  0549 11/16/20  1516   WBC 10*3/mm3 5.40 6.23   HEMOGLOBIN g/dL 12.0 13.5   PLATELETS 10*3/mm3 201 210     Results from last 7 days   Lab Units 11/17/20  0549 11/16/20  1516   SODIUM mmol/L 135* 135*   POTASSIUM mmol/L 4.3 4.6   CHLORIDE mmol/L 103 99   CO2 mmol/L 24.5 24.4   BUN mg/dL 19 25*   CREATININE mg/dL 0.98 1.23*   GLUCOSE mg/dL 100* 105*   Estimated Creatinine Clearance: 41.3 mL/min (by C-G formula based on SCr of 0.98 mg/dL).  Results from last 7 days   Lab Units 11/17/20  0549 11/16/20  1516   CALCIUM mg/dL 8.9 10.0   ALBUMIN g/dL  --  4.60   MAGNESIUM mg/dL  --  2.3     Results from last 7 days   Lab Units 11/16/20  1834   INR  1.03   APTT seconds 32.3        amLODIPine, 2.5 mg, Oral, Daily  atorvastatin, 10 mg, Oral, Daily  famotidine, 20 mg, Oral, BID AC  ferrous sulfate, 325 mg, Oral, Daily With Breakfast  levothyroxine, 100 mcg, Oral, Q AM  lisinopril, 20 mg, Oral, BID  metoprolol tartrate, 50 mg, Oral, Q12H  sodium chloride, 10 mL, Intravenous, Q12H       Diet Regular; Cardiac    Assessment/Plan      Active Hospital Problems    Diagnosis  POA   • **SAH (subarachnoid hemorrhage) (CMS/HCC) [I60.9]  Yes   • Syncope and collapse [R55]  Yes   • Subdural hematoma (CMS/HCC) [S06.5X9A]  Yes   • Hypothyroidism [E03.9]  Yes   • GERD (gastroesophageal reflux disease) [K21.9]  Yes   • Fall [W19.XXXA]  Yes   • Stage 3 chronic kidney disease [N18.30]  Yes   • Hyperlipidemia [E78.5]  Yes   • Essential hypertension [I10]  Yes      Resolved Hospital Problems   No resolved problems to display.       · SAH: Repeat CT noted. EVELIN  following.  · Syncope: Agree with decrease in bblocker given bradycardia. Also agree that vasovagal related to pain of mammogram is most likely etiology. Zio at discharge planned. Cardiology following.  · CKD3: Monitor  · Hip/Coccyx Pain: XR pelvis. PT consult.  · HTN: Monitor  · Hypothyroidism: Synthroid  · Disposition: Home/possibly tomorrow    Sebastián Sandoval MD  [facility]  11/17/20  13:54 EST    Dictated portions using Dragon dictation software.    During the entire encounter, I was wearing recommended PPE including face mask and eye protection. Hand sanitization was performed prior to entering room and upon exit.

## 2020-11-17 NOTE — CONSULTS
Patient Name: Sylvia Stover  :1940  80 y.o.    Date of Admission: 2020  Date of Consultation:  20  Encounter Provider: JUNE Harrington  Place of Service: [facility] CARDIOLOGY  Referring Provider: Rajesh Alfredo MD  Patient Care Team:  Rhoda Luna APRN as PCP - General (Internal Medicine)  Jadiel Spence MD as Consulting Physician (Nephrology)      Chief complaint: syncope    History of Present Illness: Sylvia Stvoer is a 80 year old pt with a history of HLD, HTN, and anemia    Pt presented to ER on 20 via EMS after a syncopal episode that occurred when she was getting a mammogram. She hit the back of her head. Pt denied feeling dizzy or lightheaded prior to passing out. IN ER CT of head showed small subdural hematoma and small frlontal lobe subarchnoid hemorrhage. Pt was hypertensive in ER and started on a Cardene gtt.     She denies any prior cardiac history. She is been mildly bradycardic. Sinus ryan. No evidence of heart block noted on tele. She says her HR is always slow. She is on metoprolol tartrate 75 mg BID for blood pressure.     She has a history of syncope when giving blood in the past as well as other times but can not really recall what was happening with the other episodes.     Echocardiogram has been ordered. Report is pending.     Past Medical History:   Diagnosis Date   • Age related osteoporosis    • Anemia    • Chronic gastritis    • Chronic renal insufficiency    • Gastric ulcer    • GERD (gastroesophageal reflux disease)    • Greater trochanteric bursitis    • H/O bone density study 2012    Osteopenia   • H/O mammogram 10/28/2015    stable   • Hyperlipidemia    • Hypertension    • Hypothyroidism    • Lumbago    • Osteoarthrosis    • PONV (postoperative nausea and vomiting)        Past Surgical History:   Procedure Laterality Date   • COLONOSCOPY  2006   • COLONOSCOPY N/A 2016    Procedure:  COLONOSCOPY into cecum/terminal ileum;  Surgeon: Huy Sam MD;  Location:  [facility];  Service:    • ENDOSCOPY N/A 5/12/2016    Procedure: ESOPHAGOGASTRODUODENOSCOPY with biopsy;  Surgeon: Huy Sam MD;  Location:  [facility];  Service:    • ENDOSCOPY N/A 8/12/2016    Procedure: ESOPHAGOGASTRODUODENOSCOPY WITH BIOPSIES (COLD);  Surgeon: Huy Sam MD;  Location:  [facility];  Service:    • FOOT SURGERY     • KNEE SURGERY     • PAP SMEAR  09/17/2013   • TONSILLECTOMY     • TOTAL THYROIDECTOMY     • UPPER GASTROINTESTINAL ENDOSCOPY  04/21/2014    Savary Sánchez Grade IV reflux esophagitis, gastric ulcer w/clean base, chronic gastritis, no h-pylori         Prior to Admission medications    Medication Sig Start Date End Date Taking? Authorizing Provider   amLODIPine (NORVASC) 2.5 MG tablet Take 1 tablet by mouth Daily. 9/8/20   Rhoda Luna APRN   budesonide (PULMICORT) 90 MCG/ACT inhaler Inhale 1 puff 2 (Two) Times a Day As Needed (shortness of breath). Rinse and spit after use 6/9/20   Rhoda Luna APRN   Cholecalciferol (VITAMIN D) 2000 UNITS capsule Take 1,000 Units by mouth.    ProviderEleni MD   Crisaborole 2 % ointment Apply 1 Units topically 2 (Two) Times a Day. 9/17/18   Rhoda Luna APRN   denosumab (PROLIA) 60 MG/ML solution syringe Inject  under the skin. Every 6 months 1/2/15   Jan Kim MD   diclofenac (VOLTAREN) 1 % gel gel Apply 4 g topically 4 (Four) Times a Day As Needed (pain). 5/24/17   Rhoda Luna APRN   famotidine (PEPCID) 20 MG tablet Take 20 mg by mouth 2 (Two) Times a Day.    ProviderEleni MD   ferrous sulfate 325 (65 FE) MG tablet Take 325 mg by mouth Daily With Breakfast.    ProviderEleni MD   HYDROcodone-acetaminophen (NORCO) 5-325 MG per tablet Take 1 tablet by mouth Every 6 (Six) Hours As Needed for Moderate Pain . 8/18/20   Remedios Salter MD   levothyroxine (SYNTHROID, LEVOTHROID) 100 MCG tablet Take 1  tablet by mouth Daily. 20   Rhoda Luna APRN   lidocaine (LIDODERM) 5 % Place 1 patch on the skin as directed by provider Daily. Remove & Discard patch within 12 hours or as directed by MD 20   Kristen Carvajal MD   lisinopril (PRINIVIL,ZESTRIL) 20 MG tablet Take 1 tablet by mouth 2 (Two) Times a Day. 10/12/20   Rhoda Luna APRN   meclizine (ANTIVERT) 12.5 MG tablet Take 1 tablet by mouth 3 (Three) Times a Day As Needed for dizziness. 1-2 tablets every 8 hours as needed 18   Rhoda Luna APRN   metoprolol tartrate (LOPRESSOR) 50 MG tablet Take 1.5 tablets by mouth two times daily 20   Rhoda Luna APRN   Multiple Vitamins-Minerals (MULTIVITAMIN PO) Take  by mouth.    Provider, MD Eleni   ondansetron ODT (Zofran ODT) 4 MG disintegrating tablet Place 1 tablet on the tongue Every 8 (Eight) Hours As Needed for Vomiting. 20   Kristen Carvajal MD   simvastatin (ZOCOR) 20 MG tablet Take 1 tablet by mouth every night at bedtime. 3/9/20   Rhoda Luna APRN       Allergies   Allergen Reactions   • Codeine Nausea And Vomiting       Social History     Socioeconomic History   • Marital status:      Spouse name: Not on file   • Number of children: Not on file   • Years of education: Not on file   • Highest education level: Not on file   Tobacco Use   • Smoking status: Former Smoker     Packs/day: 0.25     Types: Cigarettes     Quit date:      Years since quittin.8   • Smokeless tobacco: Never Used   Substance and Sexual Activity   • Alcohol use: Not Currently     Alcohol/week: 0.0 standard drinks   • Drug use: No   • Sexual activity: Defer       Family History   Problem Relation Age of Onset   • Colon cancer Mother    • Kidney cancer Brother        REVIEW OF SYSTEMS:   All systems reviewed.  Pertinent positives identified in HPI.  All other systems are negative.      Objective:     Vitals:    20 0004 20 0509 20 0811 20 0903   BP: 140/78  "149/85 127/83 102/64   BP Location: Left arm Right arm     Patient Position: Lying Lying     Pulse: 55 55 52 (!) 47   Resp: 18 18     Temp: 97.9 °F (36.6 °C) 98.2 °F (36.8 °C)     TempSrc: Oral Oral     SpO2: 96% 98%     Weight:    68.9 kg (152 lb)   Height:    154.9 cm (61\")     Body mass index is 28.72 kg/m².    Physical Exam:  Constitutional: She is oriented to person, place, and time. She appears well-developed. She does not appear ill.   HENT:   Head: Normocephalic and atraumatic. Head is without contusion.   Right Ear: Hearing normal. No drainage.   Left Ear: Hearing normal. No drainage.   Nose: No nasal deformity. No epistaxis.   Eyes: Lids are normal. Right eye exhibits no exudate. Left eye exhibits no exudate.  Neck: No JVD present. Carotid bruit is not present. No tracheal deviation present. No thyroid mass and no thyromegaly present.   Cardiovascular: Normal rate, regular rhythm and normal heart sounds.    Pulses:       Posterior tibial pulses are 2+ on the right side, and 2+ on the left side.   Pulmonary/Chest: Effort normal and breath sounds normal.   Abdominal: Soft. Normal appearance and bowel sounds are normal. There is no tenderness.   Musculoskeletal: Normal range of motion.        Right shoulder: She exhibits no deformity.        Left shoulder: She exhibits no deformity.   Neurological: She is alert and oriented to person, place, and time. She has normal strength.   Skin: Skin is warm, dry and intact. No rash noted.   Psychiatric: She has a normal mood and affect. Her behavior is normal. Thought content normal.   Vitals reviewed      Lab Review:     Results from last 7 days   Lab Units 11/17/20  0549 11/16/20  1516   SODIUM mmol/L 135* 135*   POTASSIUM mmol/L 4.3 4.6   CHLORIDE mmol/L 103 99   CO2 mmol/L 24.5 24.4   BUN mg/dL 19 25*   CREATININE mg/dL 0.98 1.23*   CALCIUM mg/dL 8.9 10.0   BILIRUBIN mg/dL  --  0.4   ALK PHOS U/L  --  60   ALT (SGPT) U/L  --  15   AST (SGOT) U/L  --  19   GLUCOSE " mg/dL 100* 105*     Results from last 7 days   Lab Units 11/16/20  1516   TROPONIN T ng/mL <0.010     Results from last 7 days   Lab Units 11/17/20  0549   WBC 10*3/mm3 5.40   HEMOGLOBIN g/dL 12.0   HEMATOCRIT % 35.2   PLATELETS 10*3/mm3 201     Results from last 7 days   Lab Units 11/16/20  1834   INR  1.03   APTT seconds 32.3     Results from last 7 days   Lab Units 11/16/20  1516   MAGNESIUM mg/dL 2.3                             I personally viewed and interpreted the patient's EKG/Telemetry data.      Current Facility-Administered Medications:   •  acetaminophen (TYLENOL) tablet 650 mg, 650 mg, Oral, Q4H PRN **OR** acetaminophen (TYLENOL) 160 MG/5ML solution 650 mg, 650 mg, Oral, Q4H PRN **OR** acetaminophen (TYLENOL) suppository 650 mg, 650 mg, Rectal, Q4H PRN, Janine Vasquez APRN  •  amLODIPine (NORVASC) tablet 2.5 mg, 2.5 mg, Oral, Daily, Rajesh Alfredo MD, 2.5 mg at 11/17/20 0812  •  atorvastatin (LIPITOR) tablet 10 mg, 10 mg, Oral, Daily, Rajesh Alfredo MD  •  famotidine (PEPCID) tablet 20 mg, 20 mg, Oral, BID AC, Rajesh Alfredo MD, 20 mg at 11/17/20 0631  •  ferrous sulfate tablet 325 mg, 325 mg, Oral, Daily With Breakfast, Rajesh Alfredo MD, 325 mg at 11/17/20 0811  •  HYDROcodone-acetaminophen (NORCO) 5-325 MG per tablet 1 tablet, 1 tablet, Oral, Q6H PRN, Rajesh Alfredo MD  •  levothyroxine (SYNTHROID, LEVOTHROID) tablet 100 mcg, 100 mcg, Oral, Q AM, Rajesh Alfredo MD, 100 mcg at 11/17/20 0631  •  lisinopril (PRINIVIL,ZESTRIL) tablet 20 mg, 20 mg, Oral, BID, Raejsh Alfredo MD, 20 mg at 11/17/20 0811  •  meclizine (ANTIVERT) tablet 12.5 mg, 12.5 mg, Oral, TID PRN, Rajesh Alfredo MD  •  metoprolol tartrate (LOPRESSOR) tablet 50 mg, 50 mg, Oral, Q12H, Madhavi Haq APRN  •  nitroglycerin (NITROSTAT) SL tablet 0.4 mg, 0.4 mg, Sublingual, Q5 Min PRN, Janine Vasquez, JUNE  •  ondansetron (ZOFRAN) injection 4 mg, 4 mg, Intravenous, Q6H PRN, Janine Vasquez, JUNE  •   ondansetron ODT (ZOFRAN-ODT) disintegrating tablet 4 mg, 4 mg, Oral, Q8H PRN, Rajesh Alfredo MD, 4 mg at 11/17/20 0512  •  sodium chloride 0.9 % flush 10 mL, 10 mL, Intravenous, Q12H, Janine Vasquez APRN, 10 mL at 11/17/20 0813  •  sodium chloride 0.9 % flush 10 mL, 10 mL, Intravenous, PRN, Janine Vasquez APRN    Assessment and Plan:       1. Syncope - likely vasovagal in setting of mammogram. She has a history of fainting in the past when giving blood, etc. Echocardiogram report pending, preliminarily look unremarkable. She is bradycardic though she states this is her baseline. I'm going to back off her metoprolol. We have room on amlodipine to titrate if needed for blood pressure. Will also plabn on discharging with neishao given syncope/bradycardia.     2. SAH/SDH due to #1. EVELIN has been consulted.     3. Hypertension - BP very elevated in the emergency room. Briefly on cardene. Home meds have been restarted and BP is stable.     4. Sinus bradycardia - decrease beta blocker and continue to follow on tele. Jyothi at discharge to rule out advanced conduction disease.     Madhavi Haq, JUNE  11/17/20  12:37 EST

## 2020-11-17 NOTE — PROGRESS NOTES
Discharge Planning Assessment  [facility]     Patient Name: Sylvia Stover  MRN: [id number]  Today's Date: 11/17/2020    Admit Date: 11/16/2020    Discharge Needs Assessment     Row Name 11/17/20 1132       Living Environment    Lives With  alone    Current Living Arrangements  home/apartment/condo    Potentially Unsafe Housing Conditions  other (see comments) no concerns    Primary Care Provided by  self    Provides Primary Care For  no one    Family Caregiver if Needed  child(celia), adult    Quality of Family Relationships  helpful;involved;supportive    Able to Return to Prior Arrangements  yes       Resource/Environmental Concerns    Resource/Environmental Concerns  none    Transportation Concerns  car, none       Transition Planning    Patient/Family Anticipates Transition to  home    Patient/Family Anticipated Services at Transition  none    Transportation Anticipated  family or friend will provide       Discharge Needs Assessment    Readmission Within the Last 30 Days  no previous admission in last 30 days    Equipment Currently Used at Home  cane, straight;walker, rolling;grab bar;shower chair    Concerns to be Addressed  no discharge needs identified;denies needs/concerns at this time    Anticipated Changes Related to Illness  none    Equipment Needed After Discharge  none        Discharge Plan     Row Name 11/17/20 4732       Plan    Plan  Home    Patient/Family in Agreement with Plan  yes    Plan Comments  CCP met with patient at bedside. CCP role explained and discharge planning discussed. Face sheet verified and IMM noted. Patient’s APRN is Rhoda Luna. Patient lives alone, with one step to the entrance of the home. Patient lives in a split level home with handrails at the steps. Patient has grab bars and shower chair present in the bathroom. Patient uses a cane for mobility and has access to a walker. Patient has used [facility] in the past and has been to [facility] for SNF. Patient’s preferred  pharmacy is [facility]; patient denies having trouble obtaining her medications. Patient plans to return home and does not anticipate needing HH/SNF services. Patient drives herself to her medical appointments. CCP will follow for any needs to arise. Lacy HUGHES        Continued Care and Services - Admitted Since 11/16/2020    Coordination has not been started for this encounter.     Selected Continued Care - Prior Encounters Includes selections from prior encounters from 8/18/2020 to 11/17/2020    Discharged on 8/18/2020 Admission date: 8/15/2020 - Discharge disposition: Skilled Nursing Facility (DC - External)    Destination     Service Provider Selected Services Address Phone Fax    [facility] [address] [address] [phone]                      Demographic Summary     Row Name 11/17/20 1131       General Information    Admission Type  inpatient    Arrived From  emergency department    Required Notices Provided  Important Message from Medicare    Referral Source  admission list    Reason for Consult  discharge planning    Preferred Language  English     Used During This Interaction  no        Functional Status     Row Name 11/17/20 1132       Functional Status    Usual Activity Tolerance  good    Current Activity Tolerance  good       Functional Status, IADL    Medications  assistive equipment    Meal Preparation  assistive equipment    Housekeeping  assistive equipment    Laundry  assistive equipment    Shopping  assistive equipment       Mental Status    General Appearance WDL  WDL       Mental Status Summary    Recent Changes in Mental Status/Cognitive Functioning  no changes        Psychosocial    No documentation.       Abuse/Neglect    No documentation.       Legal    No documentation.       Substance Abuse    No documentation.       Patient Forms    No documentation.           ELLEN Hernandez

## 2020-11-17 NOTE — PLAN OF CARE
Goal Outcome Evaluation:  Plan of Care Reviewed With: patient  Progress: improving  Outcome Summary: Pt AOx4, VSS, denies need for PRN pain meds. Complains of slight headache and pain at sacrum. xrays obtained of pelvis. Pt to have repeat head CT in the morning and to discharge with holter monitor. Up x1 assist with walker and gait belt.  Will continue to monitor.

## 2020-11-17 NOTE — CONSULTS
Inpatient Neurosurgery Consult  Consult performed by: Davina Serrano APRN  Consult ordered by: Janine Vasquez APRN  Reason for consult: traumatic SAH/SDH         Patient Care Team:  Rhoda Luna APRN as PCP - General (Internal Medicine)  Jadiel Spence MD as Consulting Physician (Nephrology)    Chief Complaint: syncopal episode; fall; traumatic head injury    Subjective      History of Present Illness: This is a 80-year-old female with a history of hypertension, hyperlipidemia, hypothyroidism, anemia, chronic renal insufficiency and osteoporosis.  While undergoing an outpatient mammogram yesterday the patient experienced a syncopal episode and fell to the floor.  Per the EMS department, the patient had been unconscious for about 3 minutes.  She denies any prodromal symptoms.  She was mildly confused shortly afterwards but mentation eventually cleared up.  There is no noted bowel or bladder incontinence during this episode.  Denies any history of seizures.  Admits to having syncopal episode in the past without any formal work-up.  EKG in the emergency room revealed sinus bradycardia; heart rate was 54.  She was hypertensive on presentation to the emergency department 168/110.  Blood pressure this morning is 127/83.  Patient remains bradycardic with heart rates ranging from 47 to 61 bpm.  CT imaging of the brain performed in the emergency department revealed traumatic subdural hematoma and traumatic subarachnoid hemorrhage both mild.  Neurosurgery has been asked to give an opinion regarding these results.    Review of Systems   Constitutional: Negative for activity change, appetite change and fever.   HENT: Negative for trouble swallowing.    Eyes: Negative for photophobia and visual disturbance.   Respiratory: Negative for cough, choking and shortness of breath.    Cardiovascular: Negative for chest pain and leg swelling.   Gastrointestinal: Negative for nausea and vomiting.   Genitourinary: Negative  for difficulty urinating and urgency.   Musculoskeletal: Negative.    Skin: Negative.    Allergic/Immunologic: Negative.    Neurological: Positive for syncope. Negative for dizziness, tremors, seizures, speech difficulty, weakness, light-headedness, numbness and headaches.   Psychiatric/Behavioral: Negative for confusion and sleep disturbance.        Past Medical History:   Diagnosis Date   • Age related osteoporosis    • Anemia    • Chronic gastritis    • Chronic renal insufficiency    • Gastric ulcer    • GERD (gastroesophageal reflux disease)    • Greater trochanteric bursitis    • H/O bone density study 2012    Osteopenia   • H/O mammogram 10/28/2015    stable   • Hyperlipidemia    • Hypertension    • Hypothyroidism    • Lumbago    • Osteoarthrosis    • PONV (postoperative nausea and vomiting)    ,   Past Surgical History:   Procedure Laterality Date   • COLONOSCOPY  2006   • COLONOSCOPY N/A 2016    Procedure: COLONOSCOPY into cecum/terminal ileum;  Surgeon: Huy Sam MD;  Location: [facility] ENDOSCOPY;  Service:    • ENDOSCOPY N/A 2016    Procedure: ESOPHAGOGASTRODUODENOSCOPY with biopsy;  Surgeon: Huy Sam MD;  Location: [facility] ENDOSCOPY;  Service:    • ENDOSCOPY N/A 2016    Procedure: ESOPHAGOGASTRODUODENOSCOPY WITH BIOPSIES (COLD);  Surgeon: Huy Sam MD;  Location: [facility] ENDOSCOPY;  Service:    • FOOT SURGERY     • KNEE SURGERY     • PAP SMEAR  2013   • TONSILLECTOMY     • TOTAL THYROIDECTOMY     • UPPER GASTROINTESTINAL ENDOSCOPY  2014    Janene Sánchez Grade IV reflux esophagitis, gastric ulcer w/clean base, chronic gastritis, no h-pylori   ,   Family History   Problem Relation Age of Onset   • Colon cancer Mother    • Kidney cancer Brother    ,   Social History     Tobacco Use   • Smoking status: Former Smoker     Packs/day: 0.25     Types: Cigarettes     Quit date:      Years since quittin.8   • Smokeless tobacco: Never Used   Substance Use  Topics   • Alcohol use: Not Currently     Alcohol/week: 0.0 standard drinks   • Drug use: No     E-cigarette/Vaping   • Passive Exposure No    • Counseling Given No      E-cigarette/Vaping Substances   • Nicotine No    • THC No    • CBD No    • Flavoring No      E-cigarette/Vaping Devices   • Disposable No    • Pre-filled or Refillable Cartridge No    • Refillable Tank No    • Pre-filled Pod No        ,   Medications Prior to Admission   Medication Sig Dispense Refill Last Dose   • amLODIPine (NORVASC) 2.5 MG tablet Take 1 tablet by mouth Daily. 90 tablet 3    • budesonide (PULMICORT) 90 MCG/ACT inhaler Inhale 1 puff 2 (Two) Times a Day As Needed (shortness of breath). Rinse and spit after use 3 inhaler 1    • Cholecalciferol (VITAMIN D) 2000 UNITS capsule Take 1,000 Units by mouth.      • Crisaborole 2 % ointment Apply 1 Units topically 2 (Two) Times a Day. 60 g 1    • denosumab (PROLIA) 60 MG/ML solution syringe Inject  under the skin. Every 6 months      • diclofenac (VOLTAREN) 1 % gel gel Apply 4 g topically 4 (Four) Times a Day As Needed (pain). 300 g 1    • famotidine (PEPCID) 20 MG tablet Take 20 mg by mouth 2 (Two) Times a Day.      • ferrous sulfate 325 (65 FE) MG tablet Take 325 mg by mouth Daily With Breakfast.      • HYDROcodone-acetaminophen (NORCO) 5-325 MG per tablet Take 1 tablet by mouth Every 6 (Six) Hours As Needed for Moderate Pain . 12 tablet 0    • levothyroxine (SYNTHROID, LEVOTHROID) 100 MCG tablet Take 1 tablet by mouth Daily. 90 tablet 3    • lidocaine (LIDODERM) 5 % Place 1 patch on the skin as directed by provider Daily. Remove & Discard patch within 12 hours or as directed by MD 6 each 0    • lisinopril (PRINIVIL,ZESTRIL) 20 MG tablet Take 1 tablet by mouth 2 (Two) Times a Day. 180 tablet 3    • meclizine (ANTIVERT) 12.5 MG tablet Take 1 tablet by mouth 3 (Three) Times a Day As Needed for dizziness. 1-2 tablets every 8 hours as needed 30 tablet 0    • metoprolol tartrate (LOPRESSOR) 50  MG tablet Take 1.5 tablets by mouth two times daily 270 tablet 3    • Multiple Vitamins-Minerals (MULTIVITAMIN PO) Take  by mouth.      • ondansetron ODT (Zofran ODT) 4 MG disintegrating tablet Place 1 tablet on the tongue Every 8 (Eight) Hours As Needed for Vomiting. 9 tablet 0    • simvastatin (ZOCOR) 20 MG tablet Take 1 tablet by mouth every night at bedtime. 90 tablet 3    , Scheduled Meds:  amLODIPine, 2.5 mg, Oral, Daily  atorvastatin, 10 mg, Oral, Daily  famotidine, 20 mg, Oral, BID AC  ferrous sulfate, 325 mg, Oral, Daily With Breakfast  levothyroxine, 100 mcg, Oral, Q AM  lisinopril, 20 mg, Oral, BID  metoprolol tartrate, 50 mg, Oral, Q12H  sodium chloride, 10 mL, Intravenous, Q12H    , Continuous Infusions:   , PRN Meds:  •  acetaminophen **OR** acetaminophen **OR** acetaminophen  •  HYDROcodone-acetaminophen  •  meclizine  •  nitroglycerin  •  ondansetron  •  ondansetron ODT  •  sodium chloride and Allergies:  Codeine    Objective      Vital Signs  Temp:  [97.2 °F (36.2 °C)-98.6 °F (37 °C)] 98.6 °F (37 °C)  Heart Rate:  [47-61] 52  Resp:  [16-18] 16  BP: (102-168)/() 110/79    Physical Exam  Vitals signs reviewed.   Constitutional:       General: She is not in acute distress.     Appearance: Normal appearance. She is well-developed. She is not diaphoretic.   HENT:      Head: Normocephalic and atraumatic.      Nose: Nose normal.      Mouth/Throat:      Mouth: Mucous membranes are moist.      Pharynx: Oropharynx is clear.      Comments: Bruising noted to the tip of the tongue on the left.  Appears to have bitten the tongue.  Eyes:      General:         Right eye: No discharge.         Left eye: No discharge.      Extraocular Movements: Extraocular movements intact.      Conjunctiva/sclera: Conjunctivae normal.      Pupils: Pupils are equal, round, and reactive to light.   Neck:      Musculoskeletal: Normal range of motion and neck supple.      Trachea: No tracheal deviation.   Cardiovascular:       Rate and Rhythm: Regular rhythm. Bradycardia present.   Pulmonary:      Effort: Pulmonary effort is normal. No respiratory distress.   Abdominal:      General: There is no distension.      Palpations: Abdomen is soft.      Tenderness: There is no abdominal tenderness.   Musculoskeletal: Normal range of motion.         General: No tenderness.      Right lower leg: No edema.      Left lower leg: No edema.   Skin:     General: Skin is warm and dry.      Findings: No erythema.   Neurological:      General: No focal deficit present.      Mental Status: She is alert and oriented to person, place, and time.      GCS: GCS eye subscore is 4. GCS verbal subscore is 5. GCS motor subscore is 6.      Cranial Nerves: No cranial nerve deficit.      Sensory: No sensory deficit.      Motor: No weakness or abnormal muscle tone.      Coordination: Coordination normal.      Deep Tendon Reflexes: Reflexes are normal and symmetric. Reflexes normal.      Comments: No motor or sensory deficits. DTR's normal. Negative Okeefe's; negative clonus. SLR and Sebastián's negative bilaterally.  Able to bear weight on heels and toes bilaterally.     Psychiatric:         Behavior: Behavior is cooperative.         Thought Content: Thought content normal.       Results Review:    I reviewed the patient's new clinical results.  I reviewed the patient's new imaging results and agree with the interpretation.  Discussed with Dr. Partida     CT HEAD WITHOUT CONTRAST 11/17/2020 0450    Small amount of traumatic subarachnoid hemorrhage unchanged.  Small traumatic parafalcine subdural hematoma stable and unchanged.  Diffuse brain atrophy.  Posterior parietal soft tissue hematoma noted and present on prior exam dated 11/16/2020.      CT HEAD WO CONTRAST 11/16/2020 1638    Right parafalcine traumatic subdural hematoma measuring 2 mm thick with small traumatic subarachnoid hemorrhage in the medial frontal lobes bilaterally.  No midline shift.  No mass-effect.   No evidence of infarct.    CT CERVICAL SPINE WO CONTRAST 11/16/2020 1638    No evidence of acute fracture in the cervical spine.  Compression deformity noted at T2 of uncertain chronicity.  Degenerative changes noted in the cervical spine.    .  Results from last 7 days   Lab Units 11/17/20  0549 11/16/20  1516   WBC 10*3/mm3 5.40 6.23   HEMOGLOBIN g/dL 12.0 13.5   HEMATOCRIT % 35.2 40.7   PLATELETS 10*3/mm3 201 210     .  Results from last 7 days   Lab Units 11/17/20  0549 11/16/20  1516   SODIUM mmol/L 135* 135*   POTASSIUM mmol/L 4.3 4.6   CHLORIDE mmol/L 103 99   CO2 mmol/L 24.5 24.4   BUN mg/dL 19 25*   CREATININE mg/dL 0.98 1.23*   GLUCOSE mg/dL 100* 105*   CALCIUM mg/dL 8.9 10.0     .  Results from last 7 days   Lab Units 11/16/20  1834   INR  1.03   APTT seconds 32.3           Assessment/Plan       SAH (subarachnoid hemorrhage) (CMS/HCC)    Essential hypertension    Hyperlipidemia    Stage 3 chronic kidney disease    Fall    Hypothyroidism    GERD (gastroesophageal reflux disease)    Subdural hematoma (CMS/HCC)    Syncope and collapse      Assessment & Plan     Syncopal episode; fall; struck back of head on floor  Small traumatic subdural hematoma and traumatic subarachnoid hemorrhage -stable on repeat CT today  Bradycardia    We will continue to hold aspirin for now  Continue to observe - repeat head CT in am  Cardiology consult given history of prior syncopal episodes; bradycardia  Agree with PT/OT evaluation    - I discussed the patients findings and my recommendations with patient and Dr. Partida.    Davina Serrano, APRN  11/17/20  14:27 EST

## 2020-11-17 NOTE — ED NOTES
"Nursing report ED to floor  Sylvia Stover  80 y.o.  female    HPI (triage note):   Chief Complaint   Patient presents with   • Altered Mental Status   • Fall       Admitting doctor:   Rajesh Alfredo MD    Admitting diagnosis:   The primary encounter diagnosis was SAH (subarachnoid hemorrhage) (CMS/[facility]). Diagnoses of SDH (subdural hematoma) (CMS/[facility]), Closed head injury, initial encounter, Syncope, unspecified syncope type, and Chronic kidney disease, unspecified CKD stage were also pertinent to this visit.    Code status:   Current Code Status     Date Active Code Status Order ID Comments User Context       11/16/2020 1856 CPR [id number]  English, Janine C, APRN ED     Advance Care Planning Activity      Questions for Current Code Status     Question Answer Comment    Code Status CPR     Medical Interventions (Level of Support Prior to Arrest) Full           Allergies:   Codeine    Weight:       11/16/20  1447   Weight: 69.1 kg (152 lb 6.4 oz)       Most recent vitals:   Vitals:    11/16/20 1443 11/16/20 1447 11/16/20 1600 11/16/20 1900   BP: (!) 168/110  (!) 153/114 141/88   Pulse: 54  52 61   Resp: 16      Temp: 97.2 °F (36.2 °C)      TempSrc: Tympanic      SpO2: 97%  98% 98%   Weight:  69.1 kg (152 lb 6.4 oz)     Height: 154.9 cm (61\")          Active LDAs/IV Access:   Lines, Drains & Airways    Active LDAs     Name:   Placement date:   Placement time:   Site:   Days:    Peripheral IV 11/16/20 1446 Right Antecubital   11/16/20    1446    Antecubital   less than 1                Labs (abnormal labs have a star):   Labs Reviewed   COMPREHENSIVE METABOLIC PANEL - Abnormal; Notable for the following components:       Result Value    Glucose 105 (*)     BUN 25 (*)     Creatinine 1.23 (*)     Sodium 135 (*)     eGFR Non  Amer 42 (*)     All other components within normal limits    Narrative:     GFR Normal >60  Chronic Kidney Disease <60  Kidney Failure <15     URINALYSIS W/ MICROSCOPIC IF INDICATED (NO " CULTURE) - Abnormal; Notable for the following components:    Blood, UA Trace (*)     Protein, UA Trace (*)     All other components within normal limits   CBC WITH AUTO DIFFERENTIAL - Abnormal; Notable for the following components:    Lymphocyte % 13.6 (*)     Monocyte % 12.7 (*)     All other components within normal limits   TROPONIN (IN-HOUSE) - Normal    Narrative:     Troponin T Reference Range:  <= 0.03 ng/mL-   Negative for AMI  >0.03 ng/mL-     Abnormal for myocardial necrosis.  Clinicians would have to utilize clinical acumen, EKG, Troponin and serial changes to determine if it is an Acute Myocardial Infarction or myocardial injury due to an underlying chronic condition.       Results may be falsely decreased if patient taking Biotin.     BNP (IN-HOUSE) - Normal    Narrative:     Among patients with dyspnea, NT-proBNP is highly sensitive for the detection of acute congestive heart failure. In addition NT-proBNP of <300 pg/ml effectively rules out acute congestive heart failure with 99% negative predictive value.    Results may be falsely decreased if patient taking Biotin.     MAGNESIUM - Normal   COVID PRE-OP / PRE-PROCEDURE SCREENING ORDER (NO ISOLATION)    Narrative:     The following orders were created for panel order COVID PRE-OP / PRE-PROCEDURE SCREENING ORDER (NO ISOLATION) - Swab, Nasopharynx.  Procedure                               Abnormality         Status                     ---------                               -----------         ------                     Respiratory Panel PCR w/...[[id number]]                                                   Please view results for these tests on the individual orders.   RESPIRATORY PANEL PCR W/ COVID-19 (SARS-COV-2) ERNESTO/JOSUÉ/SHAHIDA/PAD/COR/MAD/TIFFANIE IN-HOUSE, NP SWAB IN [facility], 3-4 HR TAT   URINALYSIS, MICROSCOPIC ONLY   PROTIME-INR   APTT   TSH   CBC AND DIFFERENTIAL    Narrative:     The following orders were created for panel order CBC &  Differential.  Procedure                               Abnormality         Status                     ---------                               -----------         ------                     CBC Auto Differential[[id number]]        Abnormal            Final result                 Please view results for these tests on the individual orders.       EKG:   ECG 12 Lead   Preliminary Result   HEART RATE= 54  bpm   RR Interval= 1116  ms   NV Interval= 70  ms   P Horizontal Axis= 111  deg   P Front Axis= 243  deg   QRSD Interval= 83  ms   QT Interval= 453  ms   QRS Axis= 15  deg   T Wave Axis= 35  deg   - BORDERLINE ECG -   Sinus or ectopic atrial rhythm   Short NV interval   Electronically Signed By:    Date and Time of Study: 2020-11-16 15:15:22          Meds given in ED:   Medications   niCARdipine (CARDENE) 25 mg in 250 mL NS infusion kit (0 mg/hr Intravenous Hold 11/16/20 1917)   sodium chloride 0.9 % flush 10 mL (has no administration in time range)   sodium chloride 0.9 % flush 10 mL (has no administration in time range)   acetaminophen (TYLENOL) tablet 650 mg (has no administration in time range)     Or   acetaminophen (TYLENOL) 160 MG/5ML solution 650 mg (has no administration in time range)     Or   acetaminophen (TYLENOL) suppository 650 mg (has no administration in time range)   nitroglycerin (NITROSTAT) SL tablet 0.4 mg (has no administration in time range)   ondansetron (ZOFRAN) injection 4 mg (has no administration in time range)   sodium chloride 0.9 % bolus 1,000 mL (1,000 mL Intravenous New Bag 11/16/20 1827)       Imaging results:  Ct Head Without Contrast    Result Date: 11/16/2020    1. Critical test result: Small right parafalcine subdural hematoma, small bilateral anterior medial frontal lobe subarachnoid hemorrhage, close follow-up recommended characterize change. 2. No acute cervical fracture is identified. Age-indeterminate compression deformity of T2, correlate with location of pain. Degenerative  changes of the cervical spine. If there is further clinical concern, MRI could be considered for further evaluation.  Discussed by telephone with Johnna Mendieta time of interpretation, , 2020.    This report was finalized on 2020 5:19 PM by Dr. Joseluis Khan M.D.      Ct Cervical Spine Without Contrast    Result Date: 2020    1. Critical test result: Small right parafalcine subdural hematoma, small bilateral anterior medial frontal lobe subarachnoid hemorrhage, close follow-up recommended characterize change. 2. No acute cervical fracture is identified. Age-indeterminate compression deformity of T2, correlate with location of pain. Degenerative changes of the cervical spine. If there is further clinical concern, MRI could be considered for further evaluation.  Discussed by telephone with Johnna Mendieta time of interpretation, , 2020.    This report was finalized on 2020 5:19 PM by Dr. Joseluis Khan M.D.      Xr Chest 1 View    Result Date: 2020  No focal pulmonary consolidation. Cardiomegaly. Follow-up as clinical indications persist.  This report was finalized on 2020 3:50 PM by Dr. Joseluis Khan M.D.        Ambulatory status:   - with assist    Social issues:   Social History     Socioeconomic History   • Marital status:      Spouse name: Not on file   • Number of children: Not on file   • Years of education: Not on file   • Highest education level: Not on file   Tobacco Use   • Smoking status: Former Smoker     Packs/day: 0.25     Types: Cigarettes     Quit date:      Years since quittin.8   • Smokeless tobacco: Never Used   Substance and Sexual Activity   • Alcohol use: No   • Drug use: No   • Sexual activity: Defer    Nursing report ED to floor       Lo Sánchez RN  20 1933

## 2020-11-18 ENCOUNTER — READMISSION MANAGEMENT (OUTPATIENT)
Dept: CALL CENTER | Facility: HOSPITAL | Age: 80
End: 2020-11-18

## 2020-11-18 ENCOUNTER — APPOINTMENT (OUTPATIENT)
Dept: CARDIOLOGY | Facility: HOSPITAL | Age: 80
End: 2020-11-18

## 2020-11-18 ENCOUNTER — APPOINTMENT (OUTPATIENT)
Dept: CT IMAGING | Facility: HOSPITAL | Age: 80
End: 2020-11-18

## 2020-11-18 VITALS
HEART RATE: 64 BPM | BODY MASS INDEX: 28.7 KG/M2 | DIASTOLIC BLOOD PRESSURE: 85 MMHG | HEIGHT: 61 IN | SYSTOLIC BLOOD PRESSURE: 125 MMHG | TEMPERATURE: 98 F | RESPIRATION RATE: 16 BRPM | WEIGHT: 152 LBS | OXYGEN SATURATION: 95 %

## 2020-11-18 LAB
ANION GAP SERPL CALCULATED.3IONS-SCNC: 8.1 MMOL/L (ref 5–15)
BUN SERPL-MCNC: 21 MG/DL (ref 8–23)
BUN/CREAT SERPL: 18.1 (ref 7–25)
CALCIUM SPEC-SCNC: 9.2 MG/DL (ref 8.6–10.5)
CHLORIDE SERPL-SCNC: 103 MMOL/L (ref 98–107)
CO2 SERPL-SCNC: 26.9 MMOL/L (ref 22–29)
CREAT SERPL-MCNC: 1.16 MG/DL (ref 0.57–1)
GFR SERPL CREATININE-BSD FRML MDRD: 45 ML/MIN/1.73
GLUCOSE SERPL-MCNC: 91 MG/DL (ref 65–99)
POTASSIUM SERPL-SCNC: 4.8 MMOL/L (ref 3.5–5.2)
SODIUM SERPL-SCNC: 138 MMOL/L (ref 136–145)

## 2020-11-18 PROCEDURE — 70450 CT HEAD/BRAIN W/O DYE: CPT

## 2020-11-18 PROCEDURE — 80048 BASIC METABOLIC PNL TOTAL CA: CPT | Performed by: INTERNAL MEDICINE

## 2020-11-18 PROCEDURE — 99232 SBSQ HOSP IP/OBS MODERATE 35: CPT | Performed by: INTERNAL MEDICINE

## 2020-11-18 PROCEDURE — 99232 SBSQ HOSP IP/OBS MODERATE 35: CPT | Performed by: PHYSICIAN ASSISTANT

## 2020-11-18 PROCEDURE — 0296T HC EXT ECG > 48HR TO 21 DAY RCRD W/CONECT INTL RCRD: CPT

## 2020-11-18 RX ADMIN — SODIUM CHLORIDE, PRESERVATIVE FREE 10 ML: 5 INJECTION INTRAVENOUS at 09:18

## 2020-11-18 RX ADMIN — ATORVASTATIN CALCIUM 10 MG: 20 TABLET, FILM COATED ORAL at 09:11

## 2020-11-18 RX ADMIN — FERROUS SULFATE TAB 325 MG (65 MG ELEMENTAL FE) 325 MG: 325 (65 FE) TAB at 09:11

## 2020-11-18 RX ADMIN — SODIUM CHLORIDE, PRESERVATIVE FREE 10 ML: 5 INJECTION INTRAVENOUS at 00:00

## 2020-11-18 RX ADMIN — FAMOTIDINE 20 MG: 20 TABLET, FILM COATED ORAL at 09:11

## 2020-11-18 RX ADMIN — LEVOTHYROXINE SODIUM 100 MCG: 100 TABLET ORAL at 06:20

## 2020-11-18 NOTE — PROGRESS NOTES
"    Patient Name: Sylvia Stover  :1940  80 y.o.      Patient Care Team:  Rhoda Luna APRN as PCP - General (Internal Medicine)  Jadiel Spence MD as Consulting Physician (Nephrology)    Chief Complaint: SAH, syncope    Interval History: some mild bradycardia overnight as well as borderline low BP, feels great today and wants to go home       Objective   Vital Signs  Temp:  [97.4 °F (36.3 °C)-98.6 °F (37 °C)] 97.4 °F (36.3 °C)  Heart Rate:  [47-57] 47  Resp:  [16-18] 18  BP: (102-142)/(64-83) 141/65    Intake/Output Summary (Last 24 hours) at 2020 0844  Last data filed at 2020 1258  Gross per 24 hour   Intake --   Output 300 ml   Net -300 ml     Flowsheet Rows      First Filed Value   Admission Height  154.9 cm (61\") Documented at 2020 1443   Admission Weight  69.1 kg (152 lb 6.4 oz) Documented at 2020 1447          Physical Exam:   General Appearance:    Alert, cooperative, in no acute distress   Lungs:     Clear to auscultation.  Normal respiratory effort and rate.      Heart:    Regular rhythm and normal rate, normal S1 and S2, no murmurs, gallops or rubs.     Chest Wall:    No abnormalities observed   Abdomen:     Soft, nontender, positive bowel sounds.     Extremities:   no cyanosis, clubbing or edema.  No marked joint deformities.  Adequate musculoskeletal strength.       Results Review:    Results from last 7 days   Lab Units 20  0639   SODIUM mmol/L 138   POTASSIUM mmol/L 4.8   CHLORIDE mmol/L 103   CO2 mmol/L 26.9   BUN mg/dL 21   CREATININE mg/dL 1.16*   GLUCOSE mg/dL 91   CALCIUM mg/dL 9.2     Results from last 7 days   Lab Units 20  1516   TROPONIN T ng/mL <0.010     Results from last 7 days   Lab Units 20  0549   WBC 10*3/mm3 5.40   HEMOGLOBIN g/dL 12.0   HEMATOCRIT % 35.2   PLATELETS 10*3/mm3 201     Results from last 7 days   Lab Units 20  1834   INR  1.03   APTT seconds 32.3     Results from last 7 days   Lab Units 20  1516 "   MAGNESIUM mg/dL 2.3                   Medication Review:   amLODIPine, 2.5 mg, Oral, Daily  atorvastatin, 10 mg, Oral, Daily  famotidine, 20 mg, Oral, BID AC  ferrous sulfate, 325 mg, Oral, Daily With Breakfast  levothyroxine, 100 mcg, Oral, Q AM  lisinopril, 20 mg, Oral, BID  metoprolol tartrate, 50 mg, Oral, Q12H  sodium chloride, 10 mL, Intravenous, Q12H              Assessment/Plan   Active Hospital Problems    Diagnosis  POA   • **SAH (subarachnoid hemorrhage) (CMS/HCC) [I60.9]  Yes   • Syncope and collapse [R55]  Yes   • Subdural hematoma (CMS/HCC) [S06.5X9A]  Yes   • Hypothyroidism [E03.9]  Yes   • GERD (gastroesophageal reflux disease) [K21.9]  Yes   • Fall [W19.XXXA]  Yes   • Stage 3 chronic kidney disease [N18.30]  Yes   • Hyperlipidemia [E78.5]  Yes   • Essential hypertension [I10]  Yes      Resolved Hospital Problems   No resolved problems to display.     1.  Syncope, while undergoing outpatient mammogram, considered to be vasovagal, plan on ZIO monitor at discharge  2.  Subarachnoid hemorrhage/subdural hematoma, neurosurgery following  3.  Hypertension-markedly hypertensive in the emergency room but blood pressure borderline low overnight, appropriate now, follow  4.  Sinus bradycardia, still bradycardic, I will decrease her metoprolol to 25 mg twice daily from 50 mg twice daily.  Patient had been taking 75 mg twice daily at home.    Pt plans on D/C today and that is fine from our standpoint.  We will arrange for Zio at d/c and then a 1 week telehealth visit for f/u.  Will sign off, call if we can help in any way    Rommel Sin III, MD  [facility]  11/18/20  08:44 EST

## 2020-11-18 NOTE — PLAN OF CARE
Goal Outcome Evaluation:  Plan of Care Reviewed With: patient  Progress: no change  Outcome Summary: pt oriented X 4, up with assist X 1, pt being discharged home with   Problem: Fall Injury Risk  Goal: Absence of Fall and Fall-Related Injury  Outcome: Met  Intervention: Identify and Manage Contributors to Fall Injury Risk  Recent Flowsheet Documentation  Taken 11/18/2020 1225 by Haydee Wilson RN  Medication Review/Management: medications reviewed  Taken 11/18/2020 1000 by Haydee Wilson RN  Medication Review/Management: medications reviewed  Taken 11/18/2020 0913 by Haydee Wilson RN  Medication Review/Management: medications reviewed  Intervention: Promote Injury-Free Environment  Recent Flowsheet Documentation  Taken 11/18/2020 1225 by Haydee Wilson RN  Safety Promotion/Fall Prevention:   activity supervised   assistive device/personal items within reach   clutter free environment maintained   fall prevention program maintained   nonskid shoes/slippers when out of bed   room organization consistent   safety round/check completed  Taken 11/18/2020 1000 by Haydee Wilson RN  Safety Promotion/Fall Prevention:   activity supervised   assistive device/personal items within reach   clutter free environment maintained   fall prevention program maintained   nonskid shoes/slippers when out of bed   room organization consistent   safety round/check completed  Taken 11/18/2020 0913 by Haydee Wilson RN  Safety Promotion/Fall Prevention:   activity supervised   assistive device/personal items within reach   clutter free environment maintained   fall prevention program maintained   nonskid shoes/slippers when out of bed   room organization consistent   safety round/check completed     Problem: Adult Inpatient Plan of Care  Goal: Plan of Care Review  Outcome: Met  Flowsheets (Taken 11/18/2020 1245)  Plan of Care Reviewed With: patient  Outcome Summary: pt oriented X 4, up with assist X 1, pt being discharged  home with   Goal: Patient-Specific Goal (Individualized)  Outcome: Met  Goal: Absence of Hospital-Acquired Illness or Injury  Outcome: Met  Intervention: Identify and Manage Fall Risk  Recent Flowsheet Documentation  Taken 11/18/2020 1225 by Haydee Wilson RN  Safety Promotion/Fall Prevention:   activity supervised   assistive device/personal items within reach   clutter free environment maintained   fall prevention program maintained   nonskid shoes/slippers when out of bed   room organization consistent   safety round/check completed  Taken 11/18/2020 1000 by Haydee Wilson RN  Safety Promotion/Fall Prevention:   activity supervised   assistive device/personal items within reach   clutter free environment maintained   fall prevention program maintained   nonskid shoes/slippers when out of bed   room organization consistent   safety round/check completed  Taken 11/18/2020 0913 by Haydee Wilson RN  Safety Promotion/Fall Prevention:   activity supervised   assistive device/personal items within reach   clutter free environment maintained   fall prevention program maintained   nonskid shoes/slippers when out of bed   room organization consistent   safety round/check completed  Intervention: Prevent and Manage VTE (venous thromboembolism) Risk  Recent Flowsheet Documentation  Taken 11/18/2020 0913 by Haydee Wilson RN  VTE Prevention/Management:   bilateral   dorsiflexion/plantar flexion performed  Goal: Optimal Comfort and Wellbeing  Outcome: Met  Intervention: Provide Person-Centered Care  Recent Flowsheet Documentation  Taken 11/18/2020 0913 by Haydee Wilson RN  Trust Relationship/Rapport:   care explained   thoughts/feelings acknowledged  Goal: Readiness for Transition of Care  Outcome: Met

## 2020-11-18 NOTE — SIGNIFICANT NOTE
11/18/20 1446   OTHER   Discipline occupational therapist   Rehab Time/Intention   Session Not Performed other (see comments)  (Spoke with RN, states pt is planning on d/c home. Per pt report at baseline and has equipment at home.)

## 2020-11-18 NOTE — DISCHARGE SUMMARY
Date of Admission: 11/16/2020  Date of Discharge:  11/18/2020  Primary Care Physician: Rhoda Luna, APRN     Discharge Diagnosis:  Active Hospital Problems    Diagnosis  POA   • **SAH (subarachnoid hemorrhage) (CMS/[facility]) [I60.9]  Yes   • Syncope and collapse [R55]  Yes   • Subdural hematoma (CMS/HCC) [S06.5X9A]  Yes   • Hypothyroidism [E03.9]  Yes   • GERD (gastroesophageal reflux disease) [K21.9]  Yes   • Fall [W19.XXXA]  Yes   • Stage 3 chronic kidney disease [N18.30]  Yes   • Hyperlipidemia [E78.5]  Yes   • Essential hypertension [I10]  Yes      Resolved Hospital Problems   No resolved problems to display.       Presenting Problem/History of Present Illness:  SAH (subarachnoid hemorrhage) (CMS/HCC) [I60.9]  SDH (subdural hematoma) (CMS/HCC) [S06.5X9A]  Closed head injury, initial encounter [S09.90XA]  Syncope, unspecified syncope type [R55]  Chronic kidney disease, unspecified CKD stage [N18.9]     Ms. Stover is a 80 y.o. female with a history of HLD, HTN, anemia that presents to [facility] complaining of fall. Patient states she was getting a mammogram today and passed out during the procedure, falling and hitting the back of her head. She states she doesn't remember anything until EMS arrived and states she does not remember feeling dizzy or lightheaded prior to passing out. She takes aspirin daily, on no other anticoagulation. Patient reports that she has passed out before, though not in a while, and does not see a cardiologist. Patient denies injuries otherwise and denies fever, chest pain, shortness of breath, abdominal pain, changes in bowel or bladder habits, edema. Labs done tonight were fairly unremarkable though CT head does show small subdural hematoma and small frontal lobe subarachnoid hemorrhage. She denies neuro complaints or headache. Became slightly hypertensive in ED and was started on Cardene drip per EVELIN recommendations to keep systolic BP less than 160.     Hospital  Course:  The patient is a 80 y.o. female who presented with fall and subsequent subarachnoid and subdural bleed.  She was admitted and neurosurgery was consulted.  Repeat CT scans were obtained and now shows stability and improvement of bleed.  Will ask neurosurgery to review her most recent CT prior to discharge today.  The patient is not having a headache ambulated well and is anticipating going home.    She had syncopal event which happened secondary to vasovagal episode.  This occurred because of pain related to screening mammogram she was having.  She did have bradycardia which is chronic in nature and related to beta-blocker.  Cardiology was consulted and have down titrated beta-blocker dose.  She is going to be discharged with a Zio patch and planned cardiology follow-up.    She was reporting hip/coccyx pain.  X-ray pelvis did not show any overt fracture.  If she has worsening pain over the next several weeks then would recommend repeat imaging.    CKD 3, hypertension, hypothyroidism were monitored and are currently stable.    Discharge is being performed now in the afternoon because of need for repeat neurosurgery imaging which was completed at 1 PM.    Exam Today:    Results:  CXR  No focal pulmonary consolidation. Cardiomegaly. Follow-up as  clinical indications persist.    CT Head/Cervical Spine  1. Critical test result: Small right parafalcine subdural hematoma,  small bilateral anterior medial frontal lobe subarachnoid hemorrhage,  close follow-up recommended characterize change.  2. No acute cervical fracture is identified. Age-indeterminate  compression deformity of T2, correlate with location of pain.  Degenerative changes of the cervical spine. If there is further clinical  concern, MRI could be considered for further evaluation.    CT Head  No significant interval change.     CT Head  1. No change over the 2 prior head CTs dating back to yesterday  afternoon's head CT 11/16/2020 at 4:32 PM.  2.  There is a scalp hematoma over the posterior right parietal bone from  yesterday's head trauma and there are thin localized acute right and  left parafalcine subdural hematomas, with the focal thin acute subdural  hematoma in the superior right parafalcine region measuring 3 mm in  thickness and a focal area in the mid left parafalcine region measuring  3 mm in thickness and there are areas of acute subarachnoid hemorrhage  interdigitating in the medial frontal lobe sulci just anterior to the  genu and anterior body of the corpus callosum in the interhemispheric  region. It is all unchanged since yesterday's head CT.   3. There is mild-to-moderate small vessel disease in the cerebral white  matter, calcified plaques in the intracranial segment of the distal left  vertebral artery and cavernous segments of the internal carotid arteries  bilaterally. The remainder of the head CT is normal. Follow-up to  resolution of the acute subdural hematomas and subarachnoid hemorrhage  is recommended.    XR Pelvis  The pelvic ring appears intact. No obvious displaced fracture is  identified on this single image. If there is clinical suspicion for  fracture, additional views and/or cross-sectional imaging could be  obtained. No dislocation is noted. Mild bilateral hip joint space  narrowing. Vascular calcification is suggested in the pelvis.    CT Head  1. Overall since yesterday's head CT 11/17/2020 at 3:44 PM, there has  been a slight decrease in the volume of the acute subarachnoid  hemorrhage tracking adjacent to the body of the corpus callosum in the  interhemispheric region. There is stable thin very focal acute superior  right and left parafalcine subdural hematomas measuring 2-3 mm in  thickness that exert no mass effect on the medial frontal parietal  parenchyma. Follow-up to resolution of subdural suggested.  2. There is mild-to-moderate small vessel disease in the cerebral white  matter. The remainder of the head CT is  unremarkable.     Procedures Performed:         Consults:   Consults     Date and Time Order Name Status Description    11/17/2020 1429 Inpatient Cardiology Consult      11/16/2020 1947 Inpatient Cardiology Consult Completed     11/16/2020 1856 Inpatient Neurosurgery Consult Completed     11/16/2020 1736 [facility] (on-call MD unless specified) Details Completed     11/16/2020 1720 Neurosurgery (on-call MD unless specified) Completed            Discharge Disposition:  Home or Self Care    Discharge Medications:     Discharge Medications      Changes to Medications      Instructions Start Date   metoprolol tartrate 25 MG tablet  Commonly known as: LOPRESSOR  What changed:   · medication strength  · how much to take  · how to take this  · when to take this  · additional instructions   25 mg, Oral, 2 Times Daily         Continue These Medications      Instructions Start Date   amLODIPine 2.5 MG tablet  Commonly known as: NORVASC   2.5 mg, Oral, Daily      budesonide 90 MCG/ACT inhaler  Commonly known as: PULMICORT   1 puff, Inhalation, 2 Times Daily PRN, Rinse and spit after use      Crisaborole 2 % ointment   1 Units, Apply externally, 2 Times Daily      denosumab 60 MG/ML solution syringe  Commonly known as: PROLIA   Subcutaneous, Every 6 months       diclofenac 1 % gel gel  Commonly known as: VOLTAREN   4 g, Topical, 4 Times Daily PRN      famotidine 20 MG tablet  Commonly known as: PEPCID   20 mg, Oral, 2 Times Daily      ferrous sulfate 325 (65 FE) MG tablet   325 mg, Oral, Daily With Breakfast      HYDROcodone-acetaminophen 5-325 MG per tablet  Commonly known as: NORCO   1 tablet, Oral, Every 6 Hours PRN      levothyroxine 100 MCG tablet  Commonly known as: SYNTHROID, LEVOTHROID   100 mcg, Oral, Daily      lidocaine 5 %  Commonly known as: LIDODERM   1 patch, Transdermal, Every 24 Hours, Remove & Discard patch within 12 hours or as directed by MD      lisinopril 20 MG tablet  Commonly known as: PRINIVIL,ZESTRIL   20 mg,  Oral, 2 Times Daily      meclizine 12.5 MG tablet  Commonly known as: ANTIVERT   12.5 mg, Oral, 3 Times Daily PRN, 1-2 tablets every 8 hours as needed      multivitamin tablet tablet  Commonly known as: THERAGRAN   Oral      ondansetron ODT 4 MG disintegrating tablet  Commonly known as: Zofran ODT   4 mg, Translingual, Every 8 Hours PRN      simvastatin 20 MG tablet  Commonly known as: ZOCOR   20 mg, Oral, Every Night at Bedtime      Vitamin D 50 MCG (2000 UT) capsule   1,000 Units, Oral             Discharge Diet: AT    Activity at Discharge: AT    Follow-up Appointments:  Follow-up Information     Rhoda Luna APRN Follow up.    Specialty: Internal Medicine  Contact information:  [address] [facility] [address]  [place] [address]  [phone]             Rommel Sin III, MD Follow up.    Specialty: Cardiology  Contact information:  [address]  [place] [address]  [phone]             Antonio Partida MD Follow up.    Specialty: Neurosurgery  Contact information:  [address] [facility] [address]  [place] [address]  [phone]                   Test Results Pending at Discharge:       Sebastián Sandoval MD  11/18/20  14:49 EST    Time Spent on Discharge Activities: >30 minutes    Dictated portions using Dragon dictation software.  During the entire encounter, I was wearing recommended PPE including face mask and eye protection. Hand sanitization was performed prior to entering room and upon exit.

## 2020-11-18 NOTE — SIGNIFICANT NOTE
Spoke to pt at bedside, pt reports she is at her functional baseline and does not need a PT evaluation. Pt has been up with nursing ambulating around unit with FWW with no concerns. Pt has all necessary DME needs at this time and states she will follow up with PCP for sacral pain. Pt will be safe for DC to home once medically cleared, will sign off.

## 2020-11-18 NOTE — PLAN OF CARE
Goal Outcome Evaluation:  Plan of Care Reviewed With: patient  Progress: no change     AOX4, BP meds held per parameters /69, 2L02 overnight for sats dropping, complains sacrum pain with movement rated 2 out of 10, walked in hallway one time around unit with walker and gait belt, repeat CT planned, will continue to monitor.

## 2020-11-18 NOTE — PROGRESS NOTES
[facility] NEUROSURGERY PROGRESS NOTE    PATIENT IDENTIFICATION:   Name:  Sylvia Stover      MRN:  [id number]     80 y.o.  female               CC: Fall 11/16, [facility]      Subjective     Interval History: Patient reports that she is doing well today, she does not have a headache.  She says she is anxious to discharge home.  She denies vision changes, nausea or vomiting.    ROS:  Constitutional: No fever, chills  HEENT: No headache, no vision changes, no tinnitus, no hearing difficulties  GI: No nausea, vomiting, no swallow difficulties  Neuro: No numbness, tingling, or weakness, no speech difficulties    Objective     Vital signs in last 24 hours:  Temp:  [97.4 °F (36.3 °C)-98 °F (36.7 °C)] 98 °F (36.7 °C)  Heart Rate:  [47-68] 64  Resp:  [16-18] 16  BP: ()/(65-85) 125/85      Intake/Output this shift:  No intake/output data recorded.      Intake/Output last 3 shifts:  I/O last 3 completed shifts:  In: -   Out: 700 [Urine:700]    LABS:  Results from last 7 days   Lab Units 11/17/20  0549 11/16/20  1516   WBC 10*3/mm3 5.40 6.23   HEMOGLOBIN g/dL 12.0 13.5   HEMATOCRIT % 35.2 40.7   PLATELETS 10*3/mm3 201 210     Results from last 7 days   Lab Units 11/18/20  0639 11/17/20  0549 11/16/20  1516   SODIUM mmol/L 138 135* 135*   POTASSIUM mmol/L 4.8 4.3 4.6   CHLORIDE mmol/L 103 103 99   CO2 mmol/L 26.9 24.5 24.4   BUN mg/dL 21 19 25*   CREATININE mg/dL 1.16* 0.98 1.23*   CALCIUM mg/dL 9.2 8.9 10.0   BILIRUBIN mg/dL  --   --  0.4   ALK PHOS U/L  --   --  60   ALT (SGPT) U/L  --   --  15   AST (SGOT) U/L  --   --  19   GLUCOSE mg/dL 91 100* 105*          IMAGING STUDIES:  CT head 11/18-slight decrease in the volume of acute subarachnoid hemorrhage adjacent to the body of the corpus callosum.  Right and left parafalcine subdural hematomas remain stable.  No evidence of mass-effect.    I personally viewed and interpreted the patient's CT head, and discussed the imaging with Dr. Partida.    Meds reviewed/changed:  Yes    Current Facility-Administered Medications:   •  acetaminophen (TYLENOL) tablet 650 mg, 650 mg, Oral, Q4H PRN **OR** acetaminophen (TYLENOL) 160 MG/5ML solution 650 mg, 650 mg, Oral, Q4H PRN **OR** acetaminophen (TYLENOL) suppository 650 mg, 650 mg, Rectal, Q4H PRN, Janine Vasquez, APRN  •  amLODIPine (NORVASC) tablet 2.5 mg, 2.5 mg, Oral, Daily, Rajesh Alfredo MD, 2.5 mg at 11/17/20 0812  •  atorvastatin (LIPITOR) tablet 10 mg, 10 mg, Oral, Daily, Rajesh Alfredo MD, 10 mg at 11/18/20 0911  •  famotidine (PEPCID) tablet 20 mg, 20 mg, Oral, BID AC, Rajesh Alfredo MD, 20 mg at 11/18/20 0911  •  ferrous sulfate tablet 325 mg, 325 mg, Oral, Daily With Breakfast, Rajesh Alfredo MD, 325 mg at 11/18/20 0911  •  HYDROcodone-acetaminophen (NORCO) 5-325 MG per tablet 1 tablet, 1 tablet, Oral, Q6H PRN, Rajesh Alfredo MD  •  levothyroxine (SYNTHROID, LEVOTHROID) tablet 100 mcg, 100 mcg, Oral, Q AM, Rajesh Alfredo MD, 100 mcg at 11/18/20 0620  •  lisinopril (PRINIVIL,ZESTRIL) tablet 20 mg, 20 mg, Oral, BID, Rajesh Alfredo MD, 20 mg at 11/17/20 0811  •  meclizine (ANTIVERT) tablet 12.5 mg, 12.5 mg, Oral, TID PRN, Rajesh Alfredo MD  •  metoprolol tartrate (LOPRESSOR) tablet 25 mg, 25 mg, Oral, Q12H, Rommel Sin III, MD  •  nitroglycerin (NITROSTAT) SL tablet 0.4 mg, 0.4 mg, Sublingual, Q5 Min PRN, Janine Vasquez, APRN  •  ondansetron (ZOFRAN) injection 4 mg, 4 mg, Intravenous, Q6H PRN, Janine Vasquez APRN  •  ondansetron ODT (ZOFRAN-ODT) disintegrating tablet 4 mg, 4 mg, Oral, Q8H PRN, Rajesh Alfredo MD, 4 mg at 11/17/20 0512  •  sodium chloride 0.9 % flush 10 mL, 10 mL, Intravenous, Q12H, Janine Vasquez APRN, 10 mL at 11/18/20 0918  •  sodium chloride 0.9 % flush 10 mL, 10 mL, Intravenous, PRN, Janine Vasquez APRN      Physical Exam:    General:   Awake, alert, oriented x3. Speech clear with no aphasia  HEENT:    Normocephalic, atraumatic      CN III IV VI: Extraocular  "movements are full , PERRL 3 mm brisk  CN V: Normal facial sensation  CN VII: Facial movements are symmetric. No weakness.      Motor: No drift.  Normal muscle strength, bulk and tone in upper and lower extremities.  No fasciculations, rigidity, spasticity, or abnormal movements.  Reflexes: 2+ in the upper and lower extremities. Plantar responses are flexor.   Sensation: Normal to light touch; no extinction  Station and Gait: Per PT note today: Pt has been up with nursing ambulating around unit with FWW with no concerns.  Coordination:  Finger-to-nose  test shows no dysmetria.        Assessment/Plan     ASSESSMENT:      SAH (subarachnoid hemorrhage) (CMS/HCC)    Essential hypertension    Hyperlipidemia    Stage 3 chronic kidney disease    Fall    Hypothyroidism    GERD (gastroesophageal reflux disease)    Subdural hematoma (CMS/HCC)    Syncope and collapse      PLAN: Patient is doing well today with no reports of a headache.  Her CT head was stable today.  And from a neurosurgical standpoint there is no further intervention needed.  We will plan to have the patient follow-up in 3 to 4 weeks in our office with a repeat CT head at that time.  The patient has been told that she cannot drive until that time.    I discussed the patient's findings and my recommendations with patient, nursing staff and Dr. Partida       LOS: 2 days       Heidi Kim PA-C  11/18/2020  15:03 EST    \"Dictated utilizing Dragon dictation\".      "

## 2020-11-19 ENCOUNTER — TRANSITIONAL CARE MANAGEMENT TELEPHONE ENCOUNTER (OUTPATIENT)
Dept: CALL CENTER | Facility: HOSPITAL | Age: 80
End: 2020-11-19

## 2020-11-19 ENCOUNTER — TELEPHONE (OUTPATIENT)
Dept: NEUROSURGERY | Facility: CLINIC | Age: 80
End: 2020-11-19

## 2020-11-19 DIAGNOSIS — I60.9 SAH (SUBARACHNOID HEMORRHAGE) (HCC): Primary | ICD-10-CM

## 2020-11-19 NOTE — PROGRESS NOTES
Case Management Discharge Note      Final Note: Patient DC'd home         Selected Continued Care - Discharged on 11/18/2020 Admission date: 11/16/2020 - Discharge disposition: Home or Self Care    Destination    No services have been selected for the patient.              Durable Medical Equipment    No services have been selected for the patient.              Dialysis/Infusion    No services have been selected for the patient.              Home Medical Care    No services have been selected for the patient.              Therapy    No services have been selected for the patient.              Community Resources    No services have been selected for the patient.                Selected Continued Care - Prior Encounters Includes selections from prior encounters from 8/18/2020 to 11/18/2020    Discharged on 8/18/2020 Admission date: 8/15/2020 - Discharge disposition: Skilled Nursing Facility (DC - External)    Destination     Service Provider Selected Services Address Phone Fax Patient Preferred    [facility]  Skilled Nursing [address] [place] [address] [phone] --                    Transportation Services  Private: Car    Final Discharge Disposition Code: 01 - home or self-care

## 2020-11-19 NOTE — OUTREACH NOTE
Prep Survey      Responses   Denominational facility patient discharged from?  [facility]   Is LACE score < 7 ?  No   Eligibility  TCM   Hospital  Subarachnoid hemorrhage   Date of Admission  11/16/20   Date of Discharge  11/18/20   Discharge Disposition  Home or Self Care   Discharge diagnosis  SAH   Does the patient have one of the following disease processes/diagnoses(primary or secondary)?  Other   Does the patient have Home health ordered?  No   Is there a DME ordered?  No   Prep survey completed?  Yes          Kate Miller RN

## 2020-11-19 NOTE — OUTREACH NOTE
Call Center TCM Note      Responses   [facility] patient discharged from?  [facility]   Does the patient have one of the following disease processes/diagnoses(primary or secondary)?  Other   TCM attempt successful?  Yes   Discharge diagnosis  SAH   Meds reviewed with patient/caregiver?  Yes   Is the patient having any side effects they believe may be caused by any medication additions or changes?  No   Does the patient have all medications ordered at discharge?  Yes   Is the patient taking all medications as directed (includes completed medication regime)?  Yes   Does the patient have a primary care provider?   Yes   Does the patient have an appointment with their PCP within 7 days of discharge?  Greater than 7 days [Pt is currently sched for reg fwp 128/09/2020 which is past TCM time frame. However, pt cannot drive until fwp CT and fwp with neurosgn which is 12/11/2020. Pt will call PCP ofc to discuss as she has few transportation options if she cannot drive ]   Comments regarding PCP  Rhoda WATKINS    Has the patient kept scheduled appointments due by today?  Yes   Has home health visited the patient within 72 hours of discharge?  N/A   Psychosocial issues?  No   Did the patient receive a copy of their discharge instructions?  Yes   What is the patient's perception of their health status since discharge?  Improving   Is the patient/caregiver able to teach back signs and symptoms related to disease process for when to call PCP?  Yes   Is the patient/caregiver able to teach back signs and symptoms related to disease process for when to call 911?  Yes   Is the patient/caregiver able to teach back the hierarchy of who to call/visit for symptoms/problems? PCP, Specialist, Home health nurse, Urgent Care, ED, 911  Yes   If the patient is a current smoker, are they able to teach back resources for cessation?  Not a smoker   TCM call completed?  Yes   Wrap up additional comments  Pt doing well. slept great at  home last night. Pt denies headaches, vision issues, dizziness. Pain in hip/tailbone light now, mostly when getting up/down from seated position. Pt will call PCP ofc to discuss appt. (see above)          Judy Ratliff MA    11/19/2020, 11:43 EST

## 2020-11-24 ENCOUNTER — OFFICE VISIT (OUTPATIENT)
Dept: CARDIOLOGY | Facility: CLINIC | Age: 80
End: 2020-11-24

## 2020-11-24 VITALS
WEIGHT: 139.6 LBS | DIASTOLIC BLOOD PRESSURE: 102 MMHG | SYSTOLIC BLOOD PRESSURE: 191 MMHG | HEART RATE: 57 BPM | BODY MASS INDEX: 26.38 KG/M2

## 2020-11-24 DIAGNOSIS — R55 SYNCOPE AND COLLAPSE: Primary | ICD-10-CM

## 2020-11-24 DIAGNOSIS — I10 ESSENTIAL HYPERTENSION: ICD-10-CM

## 2020-11-24 DIAGNOSIS — N18.30 STAGE 3 CHRONIC KIDNEY DISEASE, UNSPECIFIED WHETHER STAGE 3A OR 3B CKD (HCC): ICD-10-CM

## 2020-11-24 PROCEDURE — 99442 PR PHYS/QHP TELEPHONE EVALUATION 11-20 MIN: CPT | Performed by: NURSE PRACTITIONER

## 2020-11-24 NOTE — PROGRESS NOTES
[facility] CARDIOLOGY  [address]  [facility] [address]  Phone: [phone]      Patient Name: Sylvia Stover  :1940  Age: 80 y.o.  Primary Cardiologist: Rommel Sin MD  Encounter Provider:  JUNE Root      Chief Complaint:   Chief Complaint   Patient presents with   • Hospital Follow Up Visit     [facility] 1wk f/u         HPI  Sylvia Stover is a 80 y.o. female who presents today via telephone visit secondary to COVID pandemic. Patient presents today for hospital follow-up.  Patient is new to me but I have reviewed prior medical records.    Pt has a  history significant for syncope, hypertension, hyperlipidemia, GERD.    Review of medical record reveals that patient was hospitalized -2020.  Patient had a syncopal episode while getting a mammogram on date of admission.  Reported that she did not remember feeling dizzy or lightheaded prior to passing out.  CT imaging revealed a small subdural hematoma and small frontal lobe subarachnoid hemorrhage.  Patient was evaluated by neurosurgical services.  It was thought that episode occurred secondary to pain related to mammogram screening that she was obtaining.  Patient did have bradycardia during the episode.  Beta-blocker regimen was titrated down.  Patient was discharged with a ZIO monitor.  Results of monitor currently pending.    Patient reports that when she went into the hospital her dose of metoprolol succinate was 75 mg twice daily.  She reports that since being discharged her blood pressure has been elevated noting that it has been in the 190s/100s.  Heart rate has been in the 50s.  She reports that she is concerned that she is not on enough medicine to support her blood pressure.  Patient states that she has had no further episodes of lightheadedness, dizziness, syncopal episodes.  She does have a scheduled CT head with neurosurgery follow-up appointment.  She states that she has not  experienced any episodes of chest discomfort, dyspnea, edema, increased fatigue.  Largest concern is getting better blood pressure control.      The following portions of the patient's history were reviewed and updated as appropriate: allergies, current medications, past family history, past medical history, past social history, past surgical history and problem list.      Review of Systems   Constitution: Negative for malaise/fatigue.   Cardiovascular: Negative for chest pain and leg swelling.   Respiratory: Negative for shortness of breath.    Neurological: Negative for light-headedness.   All other systems reviewed and are negative.      OBJECTIVE:     Vitals:    11/24/20 1240 11/24/20 1303 11/24/20 1305   BP:  (!) 191/102    Pulse:   57   Weight: 63.3 kg (139 lb 9.6 oz)       Body mass index is 26.38 kg/m².    Physical Exam  Physical exam not performed secondary to telephone visit.    Procedures    Cardiac Procedures:  1. Echocardiogram 11/17/2020.  LVEF 70%.  LV wall thickness consistent with LVH.  Grade 2 diastolic dysfunction.  LAE.    ASSESSMENT:      Diagnosis Plan   1. Syncope and collapse     2. Stage 3 chronic kidney disease, unspecified whether stage 3a or 3b CKD     3. Essential hypertension           PLAN OF CARE:     1. Syncope.  Had a recent syncopal episode thought to be from vasovagal episode.  Patient was having a mammogram where she was experiencing pain and it was noted that her heart rate and blood pressure dropped and she passed out.  Patient did hit her head and has a subarachnoid hemorrhage for which neurosurgery is following.  Patient had unremarkable echocardiogram.  She has a ZIO monitor that is pending.  During hospitalization patient's metoprolol tartrate was decreased from 75 mg twice daily to 25 mg twice daily.  She has had no further episodes of lightheadedness, dizziness, syncope.  2. Stage III renal disease.  3. Hypertension.  With reduction of metoprolol tartrate to 25 mg twice  per day patient has noted elevated blood pressure.  She notes that blood pressure today is 190s/100s.  I do not think that her heart rate will tolerate increasing metoprolol as her heart rate is in the 50s at baseline.  She is currently on max dose of lisinopril at 40 mg/day.  I have recommended increasing amlodipine to 5 mg/day.  4. Follow-up with me in 1 week for reevaluation of blood pressure.    This patient has consented to a telehealth visit via telephone. I spent  17 minutes in total including but not limited to the direct conversation with this patient. All vitals recorded within this visit are reported by the patient.         Discharge Medications          Accurate as of November 24, 2020  1:15 PM. If you have any questions, ask your nurse or doctor.            Continue These Medications      Instructions Start Date   amLODIPine 2.5 MG tablet  Commonly known as: NORVASC   2.5 mg, Oral, Daily      budesonide 90 MCG/ACT inhaler  Commonly known as: PULMICORT   1 puff, Inhalation, 2 Times Daily PRN, Rinse and spit after use      diclofenac 1 % gel gel  Commonly known as: VOLTAREN   4 g, Topical, 4 Times Daily PRN      famotidine 20 MG tablet  Commonly known as: PEPCID   20 mg, Oral, 2 Times Daily      ferrous sulfate 325 (65 FE) MG tablet   325 mg, Oral, Daily With Breakfast      HYDROcodone-acetaminophen 5-325 MG per tablet  Commonly known as: NORCO   1 tablet, Oral, Every 6 Hours PRN      levothyroxine 100 MCG tablet  Commonly known as: SYNTHROID, LEVOTHROID   100 mcg, Oral, Daily      lidocaine 5 %  Commonly known as: LIDODERM   1 patch, Transdermal, Every 24 Hours, Remove & Discard patch within 12 hours or as directed by MD      lisinopril 20 MG tablet  Commonly known as: PRINIVIL,ZESTRIL   20 mg, Oral, 2 Times Daily      meclizine 12.5 MG tablet  Commonly known as: ANTIVERT   12.5 mg, Oral, 3 Times Daily PRN, 1-2 tablets every 8 hours as needed      metoprolol tartrate 25 MG tablet  Commonly known as:  LOPRESSOR   25 mg, Oral, 2 Times Daily      multivitamin tablet tablet  Commonly known as: THERAGRAN   Oral      ondansetron ODT 4 MG disintegrating tablet  Commonly known as: Zofran ODT   4 mg, Translingual, Every 8 Hours PRN      simvastatin 20 MG tablet  Commonly known as: ZOCOR   20 mg, Oral, Every Night at Bedtime      Vitamin D 50 MCG (2000 UT) capsule   1,000 Units, Oral             Thank you for allowing me to participate in the care of your patient,         JUNE Root  [facility]  11/24/20  12:52 EST      **Prabhjot Disclaimer:**  Much of this encounter note is an electronic transcription/translation of spoken language to printed text. The electronic translation of spoken language may permit erroneous, or at times, nonsensical words or phrases to be inadvertently transcribed. Although I have reviewed the note for such errors, some may still exist.

## 2020-12-07 NOTE — PROGRESS NOTES
Subjective   History of Present Illness: Sylvia Stover is a 80 y.o. female is here today for follow-up with a new Head CT ordered to follow up on SDH. Ms. Stover had a fall 11/16/2020.    Patient is wearing a mask in our office today.     History of Present Illness  Patient has syncopal episode on 11/16/2020 while at a mammogram.  She had a 3-minute loss of consciousness and some confusion following but no nausea vomiting incontinence or seizure.  She was found to be bradycardic.  She has been on chronic metoprolol.  Her medications were changed while in hospital.  Since that time she has had some problems with increase of her blood pressure but no other syncopal episodes or sensation of dizziness.  She had follow-up call from her cardiologist today indicating some abnormality on her Holter monitor.  They have asked her to hold her metoprolol and they are going to follow-up with her next week.  She states she has been on baby aspirin per the recommendations of her nephrologist for years.  She resumed that recently although it was not listed on her home medications or for continuation at discharge.  Her PCP told her just yesterday to stop taking it.  She denies any headaches, dizziness, additional falls, weakness, tingling, and numbness. She reports having a CT scan today 12/11/20.     The following portions of the patient's history were reviewed and updated as appropriate: allergies, current medications, past family history, past medical history, past social history, past surgical history and problem list.    Review of Systems   Constitutional: Negative for activity change.   HENT: Negative for congestion.    Eyes: Negative for visual disturbance.   Respiratory: Negative for chest tightness and shortness of breath.    Cardiovascular: Negative for chest pain.   Gastrointestinal: Negative for nausea.   Endocrine: Negative for cold intolerance.   Genitourinary: Negative for difficulty urinating.   Musculoskeletal:  "Negative for back pain and neck pain.   Skin: Negative for rash.   Allergic/Immunologic: Negative for environmental allergies.   Neurological: Negative for numbness.       Objective     Vitals:    12/11/20 1414   BP: 161/80   Pulse: 64   Temp: 97.8 °F (36.6 °C)   Weight: 63.5 kg (140 lb)   Height: 154.9 cm (61\")     Body mass index is 26.45 kg/m².    Patient brings list of blood pressure results running anywhere from 140s to 190 for  one episode since medication changes on 11/24.    Physical Exam  Vitals signs reviewed.   Eyes:      Extraocular Movements: EOM normal.      Pupils: Pupils are equal, round, and reactive to light.   Pulmonary:      Effort: Pulmonary effort is normal.   Musculoskeletal:      Thoracic back: She exhibits deformity ( Hyperkyphotic).   Skin:     General: Skin is warm and dry.   Neurological:      General: No focal deficit present.      Mental Status: She is alert and oriented to person, place, and time.      Coordination: Finger-Nose-Finger Test normal.   Psychiatric:         Mood and Affect: Mood normal.         Speech: Speech normal.         Thought Content: Thought content normal.         Judgment: Judgment normal.       Neurologic Exam     Mental Status   Oriented to person, place, and time.   Attention: normal. Concentration: normal.   Speech: speech is normal   Level of consciousness: alert  Knowledge: good.   Normal comprehension.     Cranial Nerves     CN III, IV, VI   Pupils are equal, round, and reactive to light.  Extraocular motions are normal.   Right pupil: Size: 3 mm. Shape: regular. Reactivity: brisk.   Left pupil: Size: 3 mm. Shape: regular. Reactivity: brisk.   CN III: no CN III palsy  CN VI: no CN VI palsy  Nystagmus: none     CN VII   Facial expression full, symmetric.     CN IX, X   CN IX normal.   CN X normal.     CN XII   CN XII normal.     Motor Exam   Right arm pronator drift: absent  Left arm pronator drift: absent    Gait, Coordination, and Reflexes     Gait  Gait: " (Stable with straight cane)    Coordination   Finger to nose coordination: normal        Assessment/Plan   Independent Review of Radiographic Studies:      I personally reviewed the images from the following studies.    CT scan reveals resolution of the superior anterior interhemispheric subarachnoid hemorrhage.  The bilateral superior parafalcine subdural hematomas has significantly reduced in size but remain present.    Medical Decision Making:      Patient with no headache or other neurologic complaints following subdural/subarachnoid hemorrhage after fall.  She resumed her aspirin at discharge although is not indicated on her discharge paperwork.  She has since stopped at the request of her PCP.  She is confused as to whether she should be on this or not but she states her renal specialist previously told her it was good for her kidneys.  The current CT shows significant improvement in the subdural hematomas and resolution of the subarachnoid hemorrhage.  She has no neurologic deficits on exam.  I advised her from our standpoint I would prefer she remain off aspirin till we have resolution of the subdurals.  We will plan repeat CT in about 2 to 3 weeks.  She had some abnormality on Holter monitor and will hold her metoprolol over the weekend at the request of her cardiologist who she states she was planning to follow-up with on Monday.  I asked her to speak with them as to whether or not it is appropriate for her to be on aspirin from cardiology standpoint and then speak to her family doctor and nephrologist to determine whether long-term use of aspirin is appropriate for her.  She is okay to drive from neurosurgical standpoint.  She had no seizures.  We will see her back in 3 weeks with repeat CT head.    Diagnoses and all orders for this visit:    1. Traumatic subarachnoid hemorrhage with loss of consciousness of 30 minutes or less, subsequent encounter (Primary)    2. Subdural hematoma (CMS/HCC)  -     CT  Head Without Contrast; Future      Return in about 3 weeks (around 1/1/2021) for Follow-up with [facility], with imaging.

## 2020-12-09 ENCOUNTER — OFFICE VISIT (OUTPATIENT)
Dept: INTERNAL MEDICINE | Facility: CLINIC | Age: 80
End: 2020-12-09

## 2020-12-09 VITALS — HEIGHT: 61 IN | BODY MASS INDEX: 26.43 KG/M2 | WEIGHT: 140 LBS

## 2020-12-09 DIAGNOSIS — I10 ESSENTIAL HYPERTENSION: ICD-10-CM

## 2020-12-09 DIAGNOSIS — N18.30 STAGE 3 CHRONIC KIDNEY DISEASE, UNSPECIFIED WHETHER STAGE 3A OR 3B CKD (HCC): ICD-10-CM

## 2020-12-09 DIAGNOSIS — E03.9 ACQUIRED HYPOTHYROIDISM: ICD-10-CM

## 2020-12-09 DIAGNOSIS — I60.9 SAH (SUBARACHNOID HEMORRHAGE) (HCC): Primary | ICD-10-CM

## 2020-12-09 PROCEDURE — 99443 PR PHYS/QHP TELEPHONE EVALUATION 21-30 MIN: CPT | Performed by: NURSE PRACTITIONER

## 2020-12-09 NOTE — PROGRESS NOTES
Subjective   Sylvia Stover is a 80 y.o. female who presents for a telephone visit to f/u on HTN and hypothyroidism. She also presents for f/u regarding recent hospitalization for SAH after a syncopal episode.    She was receiving her mammogram 11/16 when she had a syncopal episode and fell, hitting her head. She was taken to the ER where she was admitted for a subarachnoid hemorrhage. Her CT of the head shows gradual improvement in bleed at discharge, has f/u appt with neurosurgery.  She is wearing a holter monitor to further evaluate syncopal episode. Echo 11/2020 showed EF of 70% and grade II diastolic dysfunction.      Hypertension  This is a chronic problem. The current episode started more than 1 year ago. The problem is unchanged. The problem is controlled. Pertinent negatives include no chest pain, headaches or palpitations. Current antihypertension treatment includes calcium channel blockers and ACE inhibitors. The current treatment provides significant improvement. There are no compliance problems.    Hypothyroidism  This is a chronic problem. The current episode started more than 1 year ago. The problem occurs daily. The problem has been unchanged. Associated symptoms include arthralgias and fatigue. Pertinent negatives include no abdominal pain, chest pain, chills, congestion, coughing, fever, headaches, joint swelling, nausea, sore throat, vomiting or weakness.        The following portions of the patient's history were reviewed and updated as appropriate: allergies, current medications, past social history and problem list.    Past Medical History:   Diagnosis Date   • Age related osteoporosis    • Anemia    • Chronic gastritis    • Chronic renal insufficiency    • Gastric ulcer    • GERD (gastroesophageal reflux disease)    • Greater trochanteric bursitis    • H/O bone density study 11/2012    Osteopenia   • H/O mammogram 10/28/2015    stable   • Hyperlipidemia    • Hypertension    • Hypothyroidism     • Lumbago    • Osteoarthrosis    • PONV (postoperative nausea and vomiting)          Current Outpatient Medications:   •  amLODIPine (NORVASC) 2.5 MG tablet, Take 1 tablet by mouth Daily. (Patient taking differently: Take 5 mg by mouth Daily.), Disp: 90 tablet, Rfl: 3  •  budesonide (PULMICORT) 90 MCG/ACT inhaler, Inhale 1 puff 2 (Two) Times a Day As Needed (shortness of breath). Rinse and spit after use, Disp: 3 inhaler, Rfl: 1  •  Cholecalciferol (VITAMIN D) 2000 UNITS capsule, Take 1,000 Units by mouth., Disp: , Rfl:   •  diclofenac (VOLTAREN) 1 % gel gel, Apply 4 g topically 4 (Four) Times a Day As Needed (pain)., Disp: 300 g, Rfl: 1  •  famotidine (PEPCID) 20 MG tablet, Take 20 mg by mouth 2 (Two) Times a Day., Disp: , Rfl:   •  ferrous sulfate 325 (65 FE) MG tablet, Take 325 mg by mouth Daily With Breakfast., Disp: , Rfl:   •  HYDROcodone-acetaminophen (NORCO) 5-325 MG per tablet, Take 1 tablet by mouth Every 6 (Six) Hours As Needed for Moderate Pain ., Disp: 12 tablet, Rfl: 0  •  levothyroxine (SYNTHROID, LEVOTHROID) 100 MCG tablet, Take 1 tablet by mouth Daily., Disp: 90 tablet, Rfl: 3  •  lidocaine (LIDODERM) 5 %, Place 1 patch on the skin as directed by provider Daily. Remove & Discard patch within 12 hours or as directed by MD, Disp: 6 each, Rfl: 0  •  lisinopril (PRINIVIL,ZESTRIL) 20 MG tablet, Take 1 tablet by mouth 2 (Two) Times a Day., Disp: 180 tablet, Rfl: 3  •  meclizine (ANTIVERT) 12.5 MG tablet, Take 1 tablet by mouth 3 (Three) Times a Day As Needed for dizziness. 1-2 tablets every 8 hours as needed, Disp: 30 tablet, Rfl: 0  •  Multiple Vitamins-Minerals (MULTIVITAMIN PO), Take  by mouth., Disp: , Rfl:   •  ondansetron ODT (Zofran ODT) 4 MG disintegrating tablet, Place 1 tablet on the tongue Every 8 (Eight) Hours As Needed for Vomiting., Disp: 9 tablet, Rfl: 0  •  simvastatin (ZOCOR) 20 MG tablet, Take 1 tablet by mouth every night at bedtime., Disp: 90 tablet, Rfl: 3    Allergies   Allergen  "Reactions   • Codeine Nausea And Vomiting       Review of Systems   Constitutional: Positive for fatigue. Negative for chills, fever and unexpected weight change.   HENT: Negative for congestion, ear pain, postnasal drip, sinus pressure, sore throat and trouble swallowing.    Eyes: Negative for visual disturbance.   Respiratory: Negative for cough, chest tightness and wheezing.    Cardiovascular: Negative for chest pain, palpitations and leg swelling.   Gastrointestinal: Negative for abdominal pain, blood in stool, nausea and vomiting.   Genitourinary: Negative for dysuria, frequency and urgency.   Musculoskeletal: Positive for arthralgias. Negative for joint swelling.   Skin: Negative for color change.   Neurological: Negative for syncope, weakness and headaches.        No seizure activity   Hematological: Does not bruise/bleed easily.       Objective   Vitals:    12/09/20 0922   Weight: 63.5 kg (140 lb)   Height: 154.9 cm (61\")     Body mass index is 26.45 kg/m².  Physical Exam  Psychiatric:         Behavior: Behavior normal.         Thought Content: Thought content normal.         Judgment: Judgment normal.         Assessment/Plan   Diagnoses and all orders for this visit:    1. SAH (subarachnoid hemorrhage) (CMS/[facility]) (Primary)    2. Stage 3 chronic kidney disease, unspecified whether stage 3a or 3b CKD    3. Essential hypertension    4. Acquired hypothyroidism    1. SAH-no recent seizure activity, she has f/u appt for CT head with neurosurgery f/u. She has restarted her ASA 81 mg which I have asked her to discontinue. She was hospitalized 8/2020 for a hematoma of the left psoas region after a fall. She has been advised by nephrology to take ASA but I do not think the benefits outweigh the increased risk of bleeding.   2. CKD-stable, advised to discuss ASA use with nephrology but I would like her to discontinue immediately.  3. HTN-BP is stable on current medications.  4. Hypothyroidism-currently managed on " Synthroid, TSH within therapeutic range 11/2020.    History and medical judgement obtained from phone conversation with patient. No physical examination was performed. Approximately 25 minutes spent in phone conversation with patient.     You have chosen to receive care through a telephone visit. Do you consent to use a telephone visit for your medical care today? Yes

## 2020-12-11 ENCOUNTER — TELEPHONE (OUTPATIENT)
Dept: CARDIOLOGY | Facility: CLINIC | Age: 80
End: 2020-12-11

## 2020-12-11 ENCOUNTER — HOSPITAL ENCOUNTER (OUTPATIENT)
Dept: CT IMAGING | Facility: HOSPITAL | Age: 80
Discharge: HOME | End: 2020-12-11
Admitting: NEUROLOGICAL SURGERY

## 2020-12-11 ENCOUNTER — OFFICE VISIT (OUTPATIENT)
Dept: NEUROSURGERY | Facility: CLINIC | Age: 80
End: 2020-12-11

## 2020-12-11 VITALS
TEMPERATURE: 97.8 F | SYSTOLIC BLOOD PRESSURE: 161 MMHG | BODY MASS INDEX: 26.43 KG/M2 | DIASTOLIC BLOOD PRESSURE: 80 MMHG | HEIGHT: 61 IN | HEART RATE: 64 BPM | WEIGHT: 140 LBS

## 2020-12-11 DIAGNOSIS — S06.5XAA SUBDURAL HEMATOMA (HCC): ICD-10-CM

## 2020-12-11 DIAGNOSIS — S06.6X1D TRAUMATIC SUBARACHNOID HEMORRHAGE WITH LOSS OF CONSCIOUSNESS OF 30 MINUTES OR LESS, SUBSEQUENT ENCOUNTER: Primary | ICD-10-CM

## 2020-12-11 DIAGNOSIS — I60.9 SAH (SUBARACHNOID HEMORRHAGE) (HCC): ICD-10-CM

## 2020-12-11 DIAGNOSIS — I45.5 SINUS PAUSE: Primary | ICD-10-CM

## 2020-12-11 PROBLEM — S06.6X1A TRAUMATIC SUBARACHNOID BLEED WITH LOC OF 30 MINUTES OR LESS (HCC): Status: RESOLVED | Noted: 2020-11-16 | Resolved: 2020-12-11

## 2020-12-11 PROBLEM — S06.6X1A TRAUMATIC SUBARACHNOID BLEED WITH LOC OF 30 MINUTES OR LESS (HCC): Status: ACTIVE | Noted: 2020-11-16

## 2020-12-11 PROCEDURE — 70450 CT HEAD/BRAIN W/O DYE: CPT

## 2020-12-11 PROCEDURE — 0298T HOLTER MONITOR - 72 HOUR UP TO 21 DAY: CPT | Performed by: INTERNAL MEDICINE

## 2020-12-11 PROCEDURE — 99214 OFFICE O/P EST MOD 30 MIN: CPT | Performed by: NURSE PRACTITIONER

## 2020-12-11 NOTE — TELEPHONE ENCOUNTER
Dr. Sin     Holter Monitor Results    Result Text     · An abnormal monitor study.  · Mobtiz II heart block, intermittent and brief  · 3-4 second sinus pauses  · Abberantly conducted SVT vs NSVT, longest lasting 5.7 seconds  · No atrial fibrillation noted     She recently had a syncopal episode. I am covering your in basket and saw the pauses. I called patient and recommended that she hold the metoprolol.    I told her that we will further discuss and make recommendations.  She would like to call back on Monday.  Please advise.

## 2020-12-14 NOTE — TELEPHONE ENCOUNTER
I spoke with patient about Dr. Sin recommendations.  She will continue to hold the metoprolol.  We will arrange for her to wear Holter monitor that she can  in the office and then send back via mail.    I spoke with Shaunna and she recommended [employer].     Estrellita - please arrange a 48 hour holter monitor in 2 weeks. Shaunna in holter has been notified.

## 2020-12-28 NOTE — PROGRESS NOTES
"Subjective   Patient ID: Sylvia Stover is a 80 y.o. female is here today for follow-up with same day head CT.  She was last seen in the office with JUNE Montague on 12/11/2020. Ms. Stover had a fall on 11/16/2020.  Believes this fall was related to her mammogram that she had on the same day, 11/16/2020.  She states that the mammogram tech hurt her while she was in the mammogram machine and she states she believes she fainted as a result.    Today, she reports having no pain since her fall. She denies numbness tingling and weakness. She denies dizziness and headaches. She reports having a CT head scan on 12/31/2020.       History of Present Illness    The following portions of the patient's history were reviewed and updated as appropriate: allergies, current medications, past family history, past medical history, past social history, past surgical history and problem list.    Review of Systems   Constitutional: Negative for activity change.   Eyes: Negative for discharge and visual disturbance.   Respiratory: Negative for chest tightness and shortness of breath.    Cardiovascular: Negative for chest pain.   Musculoskeletal: Negative for neck pain.   Neurological: Negative for dizziness, syncope, weakness, numbness and headaches.   Psychiatric/Behavioral: Negative for confusion.   All other systems reviewed and are negative.        Objective     Vitals:    12/31/20 0842   BP: 159/97   BP Location: Left arm   Patient Position: Sitting   Pulse: 86   Temp: 97.4 °F (36.3 °C)   TempSrc: Temporal   Weight: 63.5 kg (140 lb)   Height: 154.9 cm (61\")     Body mass index is 26.45 kg/m².      Physical Exam  Vitals signs and nursing note reviewed.   Constitutional:       Appearance: Normal appearance. She is well-developed and normal weight.   HENT:      Head: Normocephalic and atraumatic.      Nose: Nose normal.   Eyes:      General: Lids are normal.      Extraocular Movements: Extraocular movements intact.      " Conjunctiva/sclera: Conjunctivae normal.      Pupils: Pupils are equal, round, and reactive to light.      Visual Fields: Right eye visual fields normal and left eye visual fields normal.   Neck:      Musculoskeletal: Normal range of motion.   Cardiovascular:      Rate and Rhythm: Normal rate.   Pulmonary:      Effort: Pulmonary effort is normal.   Skin:     General: Skin is warm and dry.   Neurological:      General: No focal deficit present.      Mental Status: She is alert and oriented to person, place, and time.      GCS: GCS eye subscore is 4. GCS verbal subscore is 5. GCS motor subscore is 6.      Cranial Nerves: Cranial nerves are intact.      Sensory: Sensation is intact.      Motor: Motor function is intact.      Coordination: Coordination is intact.      Gait: Gait is intact.   Psychiatric:         Attention and Perception: Attention and perception normal.         Mood and Affect: Mood normal.         Speech: Speech normal.         Behavior: Behavior normal. Behavior is cooperative.         Thought Content: Thought content normal.         Cognition and Memory: Cognition and memory normal.         Judgment: Judgment normal.       Neurologic Exam     Mental Status   Oriented to person, place, and time.   Speech: speech is normal     Cranial Nerves     CN II   Visual fields full to confrontation.     CN III, IV, VI   Pupils are equal, round, and reactive to light.  Right pupil: Size: 3 mm. Shape: regular. Reactivity: brisk.   Left pupil: Size: 3 mm. Shape: regular. Reactivity: brisk.     Motor Exam   Muscle bulk: normal  Overall muscle tone: normal  Right arm pronator drift: absent  Left arm pronator drift: absent  Right leg tone: normal  Left leg tone: normal    Sensory Exam   Light touch normal.     Gait, Coordination, and Reflexes     Gait  Gait: normal    Tremor   Resting tremor: absent  Intention tremor: absent  Action tremor: absent        Assessment/Plan   Independent Review of Radiographic Studies:       CT head 12/31/2020 reveals the superior anterior interhemispheric subarachnoid hemorrhage appears to be completely resolved, when compared to the original CT in November and the CT of 12/18/2020.      Medical Decision Making:    Ms. Stover reports to the office today as a follow-up from a superior anterior interhemispheric subarachnoid hemorrhage that was present as a result of a fall on 11/18/2020.  He has resumed her aspirin.  Denies any numbness, tingling, weakness in either extremity, blurred vision, dizziness, or headaches.  States she is going to the cardiologist today for a follow-up related to her blood pressure medicine.  Noted her blood pressure today in the office is 159/97 without any symptoms.  She states she is having a Holter monitor put on today to check her cardiac function related to her medications.  She does not need to follow-up in our office unless she has any new complaints of headache, dizziness, blurred vision, or sudden onset of weakness.    Diagnoses and all orders for this visit:    1. Subdural hematoma (CMS/HCC) (Primary)      Return for PRN, with any new or worsening symptoms.

## 2020-12-31 ENCOUNTER — HOSPITAL ENCOUNTER (OUTPATIENT)
Dept: CT IMAGING | Facility: HOSPITAL | Age: 80
Discharge: HOME | End: 2020-12-31
Admitting: NURSE PRACTITIONER

## 2020-12-31 ENCOUNTER — OFFICE VISIT (OUTPATIENT)
Dept: NEUROSURGERY | Facility: CLINIC | Age: 80
End: 2020-12-31

## 2020-12-31 VITALS
BODY MASS INDEX: 26.43 KG/M2 | HEIGHT: 61 IN | HEART RATE: 86 BPM | SYSTOLIC BLOOD PRESSURE: 159 MMHG | DIASTOLIC BLOOD PRESSURE: 97 MMHG | TEMPERATURE: 97.4 F | WEIGHT: 140 LBS

## 2020-12-31 DIAGNOSIS — S06.5XAA SUBDURAL HEMATOMA (HCC): Primary | ICD-10-CM

## 2020-12-31 DIAGNOSIS — S06.5XAA SUBDURAL HEMATOMA (HCC): ICD-10-CM

## 2020-12-31 PROCEDURE — 99213 OFFICE O/P EST LOW 20 MIN: CPT | Performed by: NURSE PRACTITIONER

## 2020-12-31 PROCEDURE — 70450 CT HEAD/BRAIN W/O DYE: CPT

## 2021-01-11 DIAGNOSIS — E78.5 HYPERLIPIDEMIA, UNSPECIFIED HYPERLIPIDEMIA TYPE: ICD-10-CM

## 2021-01-12 RX ORDER — SIMVASTATIN 20 MG
20 TABLET ORAL
Qty: 90 TABLET | Refills: 3 | Status: SHIPPED | OUTPATIENT
Start: 2021-01-12 | End: 2021-04-20

## 2021-01-13 ENCOUNTER — TELEPHONE (OUTPATIENT)
Dept: CARDIOLOGY | Facility: CLINIC | Age: 81
End: 2021-01-13

## 2021-01-13 DIAGNOSIS — I10 ESSENTIAL HYPERTENSION: ICD-10-CM

## 2021-01-13 PROBLEM — I49.1 APC (ATRIAL PREMATURE CONTRACTIONS): Status: ACTIVE | Noted: 2021-01-13

## 2021-01-13 RX ORDER — AMLODIPINE BESYLATE 10 MG/1
10 TABLET ORAL DAILY
Qty: 90 TABLET | Refills: 0 | Status: SHIPPED | OUTPATIENT
Start: 2021-01-13 | End: 2021-02-23 | Stop reason: SDUPTHER

## 2021-01-13 NOTE — TELEPHONE ENCOUNTER
Holter:    Interpretation Summary       · A relatively benign monitor study.     1.  Normal sinus rhythm  2.  Occasional PACs  3.  One 10 beat episode of SVT.  No sustained runs of SVT.  Atrial fibrillation not detected  4.  Rare PVCs, unifocal  5.  Ventricular tachycardia not detected  6.  No pauses or high degree AV block       Patient holding beta-blocker (metoprolol) her recent syncopal episode, sinus pauses, Mobitz 2 heart block, and nonsustained VT versus nonsustained SVT.  This monitor was completed and no further episodes were noted off the beta-blocker.    I recommended that she does not take the beta-blocker.  Her blood pressure is averaging 140-150s/90s with heart rates in the upper 80s-90s.  I recommended increasing the amlodipine to 10 mg daily.  We will perform a telehealth visit in 4 to 6 weeks.    Estrellita-please arrange a telehealth visit in 4 to 6 weeks with me for follow-up on blood pressure.  Thank you

## 2021-02-07 DIAGNOSIS — E03.9 ACQUIRED HYPOTHYROIDISM: ICD-10-CM

## 2021-02-10 ENCOUNTER — PATIENT MESSAGE (OUTPATIENT)
Dept: INTERNAL MEDICINE | Facility: CLINIC | Age: 81
End: 2021-02-10

## 2021-02-10 RX ORDER — LEVOTHYROXINE SODIUM 0.1 MG/1
100 TABLET ORAL DAILY
Qty: 90 TABLET | Refills: 3 | Status: SHIPPED | OUTPATIENT
Start: 2021-02-10 | End: 2021-06-02 | Stop reason: SDUPTHER

## 2021-02-10 NOTE — TELEPHONE ENCOUNTER
From: Sylvia Stover  To: JUNE Mohr  Sent: 2/10/2021 12:49 PM EST  Subject: Prescription Question    I just received a msg regarding a refill for levothyroxine. I don't need a refill right now. Not sure why this refill was sent. Who requested the refill?

## 2021-02-22 ENCOUNTER — OFFICE VISIT (OUTPATIENT)
Dept: CARDIOLOGY | Facility: CLINIC | Age: 81
End: 2021-02-22

## 2021-02-22 VITALS
DIASTOLIC BLOOD PRESSURE: 78 MMHG | HEIGHT: 61 IN | WEIGHT: 139 LBS | SYSTOLIC BLOOD PRESSURE: 134 MMHG | BODY MASS INDEX: 26.24 KG/M2 | HEART RATE: 79 BPM

## 2021-02-22 DIAGNOSIS — R55 SYNCOPE, UNSPECIFIED SYNCOPE TYPE: ICD-10-CM

## 2021-02-22 DIAGNOSIS — E78.2 MIXED HYPERLIPIDEMIA: ICD-10-CM

## 2021-02-22 DIAGNOSIS — I47.1 PSVT (PAROXYSMAL SUPRAVENTRICULAR TACHYCARDIA) (HCC): ICD-10-CM

## 2021-02-22 DIAGNOSIS — R00.1 SINUS BRADYCARDIA: Primary | ICD-10-CM

## 2021-02-22 DIAGNOSIS — I10 ESSENTIAL HYPERTENSION: ICD-10-CM

## 2021-02-22 DIAGNOSIS — I45.5 SINUS PAUSE: ICD-10-CM

## 2021-02-22 DIAGNOSIS — I44.1 MOBITZ TYPE 2 SECOND DEGREE AV BLOCK: ICD-10-CM

## 2021-02-22 PROBLEM — G56.00 CARPAL TUNNEL SYNDROME: Status: ACTIVE | Noted: 2020-05-12

## 2021-02-22 PROCEDURE — 99442 PR PHYS/QHP TELEPHONE EVALUATION 11-20 MIN: CPT | Performed by: NURSE PRACTITIONER

## 2021-02-22 NOTE — PROGRESS NOTES
Telehealth Visit    Date of Visit: 2021  Encounter Provider: JUNE Ca  Place of Service: [facility] CARDIOLOGY  Patient Name: Sylvia Stover  :1940  Primary Cardiologist: Dr. Rommel Sin    Chief Complaint   Patient presents with   • Hypertension   • Follow-up       HPI: Sylvia Stover is a pleasant 80 y.o. female who is an established patient of our practice. Due to COVID-19 virus, I am conducting a telehealth visit via audio/telephone with patient and she has consented to this visit today.  We attempted a video visit, but she was unable to connect.  She is a new patient to me and her previous records have been reviewed.    She has been diagnosed with hypertension, hyperlipidemia, and GERD.    In 2020, she had a syncopal episode while getting a mammogram.  She did not have any prodrome symptoms.  CT imaging revealed a small subdural hematoma and small frontal lobe subarachnoid hemorrhage.  Neurology felt the episode occurred secondary to pain related to the mammogram screening.  She was bradycardic and discharged on a Zio patch monitor.  She was also taking metoprolol at the time.    On 2020 14-day Holter monitor showed the following: Mobitz 2 heart block intermittent brief, 3-4-second sinus pauses, aberrantly conducted SVT versus an SVT with the longest lasting 5.7 seconds in duration.  There was no atrial fibrillation.  I reviewed the study and discontinued her metoprolol.    On 2020 she wore another Holter monitor after the beta-blocker was discontinued.  This showed normal sinus rhythm, occasional PACs, 1-10 beat episode of SVT, no atrial fibrillation, rare PVCs, and no sinus pauses.  I recommended that she stay off the beta-blocker and follow-up with Dr. Sin.  When I called her with the results she told me that her blood pressure was elevated 140-150s/90s and I increase her amlodipine to 10 mg daily.    Today is her follow-up visit.   Her blood pressure has been better controlled with the increase of amlodipine to 10 mg daily.  Her blood pressures are running 120-130s/70s with heart rates in the 80s.  She had one reading of 140/85.  She has noticed some mild ankle edema, but says is not bothersome.  She denies chest pain, shortness of air, headaches, palpitations, dizziness, syncope, or bleeding.  She feels that she is doing well on the new medication regimen.      Past Medical History:   Diagnosis Date   • Age related osteoporosis    • Anemia    • Chronic gastritis    • Chronic renal insufficiency    • Gastric ulcer    • GERD (gastroesophageal reflux disease)    • Greater trochanteric bursitis    • H/O bone density study 11/2012    Osteopenia   • H/O mammogram 10/28/2015    stable   • Hyperlipidemia    • Hypertension    • Hypothyroidism    • Lumbago    • Osteoarthrosis    • PONV (postoperative nausea and vomiting)    • SAH (subarachnoid hemorrhage) (CMS/HCC)    • Sinus bradycardia    • Stage 3 chronic kidney disease (CMS/HCC)    • Subdural hematoma (CMS/HCC)    • Syncope and collapse        Past Surgical History:   Procedure Laterality Date   • COLONOSCOPY  07/06/2006   • COLONOSCOPY N/A 5/12/2016    Procedure: COLONOSCOPY into cecum/terminal ileum;  Surgeon: Huy Sam MD;  Location: [facility] ENDOSCOPY;  Service:    • ENDOSCOPY N/A 5/12/2016    Procedure: ESOPHAGOGASTRODUODENOSCOPY with biopsy;  Surgeon: Huy Sam MD;  Location: [facility] ENDOSCOPY;  Service:    • ENDOSCOPY N/A 8/12/2016    Procedure: ESOPHAGOGASTRODUODENOSCOPY WITH BIOPSIES (COLD);  Surgeon: Huy Sam MD;  Location: [facility] ENDOSCOPY;  Service:    • FOOT SURGERY     • KNEE SURGERY     • PAP SMEAR  09/17/2013   • TONSILLECTOMY     • TOTAL THYROIDECTOMY     • UPPER GASTROINTESTINAL ENDOSCOPY  04/21/2014    Janene Sánchez Grade IV reflux esophagitis, gastric ulcer w/clean base, chronic gastritis, no h-pylori       Social History     Socioeconomic History   • Marital  status:      Spouse name: Not on file   • Number of children: Not on file   • Years of education: Not on file   • Highest education level: Not on file   Tobacco Use   • Smoking status: Former Smoker     Packs/day: 0.25     Types: Cigarettes     Quit date:      Years since quittin.1   • Smokeless tobacco: Never Used   Substance and Sexual Activity   • Alcohol use: Not Currently     Alcohol/week: 0.0 standard drinks     Comment: caffeine1 cup daily   • Drug use: No   • Sexual activity: Defer       Family History   Problem Relation Age of Onset   • Colon cancer Mother    • Kidney cancer Brother        The following portion of the patient's history were reviewed and updated as appropriate: past medical history, past surgical history, past social history, past family history, allergies, current medications, and problem list.    Review of Systems   Constitution: Negative.   Cardiovascular: Positive for leg swelling.   Respiratory: Negative.    Hematologic/Lymphatic: Negative.    Neurological: Negative.        Allergies   Allergen Reactions   • Codeine Nausea And Vomiting   • Metoprolol Other (See Comments)     Pauses, second degree AV block          Current Outpatient Medications:   •  amLODIPine (NORVASC) 10 MG tablet, Take 1 tablet by mouth Daily., Disp: 90 tablet, Rfl: 0  •  budesonide (PULMICORT) 90 MCG/ACT inhaler, Inhale 1 puff 2 (Two) Times a Day As Needed (shortness of breath). Rinse and spit after use, Disp: 3 inhaler, Rfl: 1  •  Cholecalciferol (VITAMIN D) 2000 UNITS capsule, Take 1,000 Units by mouth., Disp: , Rfl:   •  diclofenac (VOLTAREN) 1 % gel gel, Apply 4 g topically 4 (Four) Times a Day As Needed (pain)., Disp: 300 g, Rfl: 1  •  famotidine (PEPCID) 20 MG tablet, Take 20 mg by mouth Daily., Disp: , Rfl:   •  ferrous sulfate 325 (65 FE) MG tablet, Take 325 mg by mouth 2 (two) times a day., Disp: , Rfl:   •  HYDROcodone-acetaminophen (NORCO) 5-325 MG per tablet, Take 1 tablet by mouth  "Every 6 (Six) Hours As Needed for Moderate Pain ., Disp: 12 tablet, Rfl: 0  •  levothyroxine (SYNTHROID, LEVOTHROID) 100 MCG tablet, TAKE 1 TABLET BY MOUTH  DAILY, Disp: 90 tablet, Rfl: 3  •  lisinopril (PRINIVIL,ZESTRIL) 20 MG tablet, Take 1 tablet by mouth 2 (Two) Times a Day., Disp: 180 tablet, Rfl: 3  •  meclizine (ANTIVERT) 12.5 MG tablet, Take 1 tablet by mouth 3 (Three) Times a Day As Needed for dizziness. 1-2 tablets every 8 hours as needed, Disp: 30 tablet, Rfl: 0  •  Multiple Vitamins-Minerals (MULTIVITAMIN PO), Take  by mouth., Disp: , Rfl:   •  ondansetron ODT (Zofran ODT) 4 MG disintegrating tablet, Place 1 tablet on the tongue Every 8 (Eight) Hours As Needed for Vomiting., Disp: 9 tablet, Rfl: 0  •  simvastatin (ZOCOR) 20 MG tablet, Take 1 tablet by mouth every night at bedtime., Disp: 90 tablet, Rfl: 3        Objective:     Vitals:    02/22/21 1107   BP: 134/78   BP Location: Left arm   Pulse: 79   Weight: 63 kg (139 lb)   Height: 154.9 cm (61\")     Body mass index is 26.26 kg/m².    Due to telehealth visit, there was no EKG, vitals, or weight performed in our office.  Vitals/Weight were reported by the patient and conducted at home.       Assessment:      No diagnosis found.       Plan:       1.  Bradycardia: Resolved with the discontinuation of metoprolol.    2-4.  Abnormal Holter: Mobitz 2 heart block intermittent brief, 3-4-second sinus pauses, aberrantly conducted SVT versus SVT with the longest lasting episode lasting 5.7 seconds in duration.  There was no atrial fibrillation noted.  Follow-up Holter monitor showed resolution with the discontinuation of metoprolol.    5.  Hypertension: Blood pressure better controlled with the addition of amlodipine 10 mg daily.    6.  Syncope: Resolved.    7.  Hyperlipidemia: Remains on simvastatin.    8.  I recommended follow-up with Dr. Sin in 1 year, unless otherwise stated sooner.  I have refilled her amlodipine for 1 year.    This patient has consented " to a telehealth visit via audio. The visit was scheduled as a audio visit to comply with patient safety concerns in accordance with CDC recommendations.  All vitals recorded within this visit are reported by the patient.  I spent 25 minutes in total including but not limited to the 12 minutes spent in direct conversation with this patient.      As always, it has been a pleasure to participate in your patient's care. Thank you.       Sincerely,         JUNE Munoz        Dictated utilizing Dragon dictation

## 2021-02-23 PROBLEM — I44.1 MOBITZ TYPE 2 SECOND DEGREE AV BLOCK: Status: ACTIVE | Noted: 2021-02-23

## 2021-02-23 PROBLEM — I47.10 PSVT (PAROXYSMAL SUPRAVENTRICULAR TACHYCARDIA): Status: ACTIVE | Noted: 2021-02-23

## 2021-02-23 PROBLEM — I47.1 PSVT (PAROXYSMAL SUPRAVENTRICULAR TACHYCARDIA): Status: ACTIVE | Noted: 2021-02-23

## 2021-02-23 PROBLEM — R00.1 SINUS BRADYCARDIA: Status: ACTIVE | Noted: 2021-02-23

## 2021-02-23 PROBLEM — I45.5 SINUS PAUSE: Status: ACTIVE | Noted: 2021-02-23

## 2021-02-23 RX ORDER — AMLODIPINE BESYLATE 10 MG/1
10 TABLET ORAL DAILY
Qty: 90 TABLET | Refills: 3 | Status: SHIPPED | OUTPATIENT
Start: 2021-02-23 | End: 2021-03-31 | Stop reason: SDUPTHER

## 2021-03-31 DIAGNOSIS — I10 ESSENTIAL HYPERTENSION: ICD-10-CM

## 2021-03-31 RX ORDER — AMLODIPINE BESYLATE 10 MG/1
10 TABLET ORAL DAILY
Qty: 90 TABLET | Refills: 3 | Status: SHIPPED | OUTPATIENT
Start: 2021-03-31 | End: 2022-01-03

## 2021-04-12 ENCOUNTER — OFFICE VISIT (OUTPATIENT)
Dept: INTERNAL MEDICINE | Facility: CLINIC | Age: 81
End: 2021-04-12

## 2021-04-12 VITALS — HEIGHT: 61 IN | BODY MASS INDEX: 26.26 KG/M2

## 2021-04-12 DIAGNOSIS — M81.0 OSTEOPOROSIS, SENILE: Chronic | ICD-10-CM

## 2021-04-12 DIAGNOSIS — E89.0 POSTOPERATIVE HYPOTHYROIDISM: Chronic | ICD-10-CM

## 2021-04-12 DIAGNOSIS — N18.30 STAGE 3 CHRONIC KIDNEY DISEASE, UNSPECIFIED WHETHER STAGE 3A OR 3B CKD (HCC): Chronic | ICD-10-CM

## 2021-04-12 DIAGNOSIS — I10 ESSENTIAL HYPERTENSION: Primary | ICD-10-CM

## 2021-04-12 DIAGNOSIS — J98.01 BRONCHOSPASM: ICD-10-CM

## 2021-04-12 DIAGNOSIS — E78.00 PURE HYPERCHOLESTEROLEMIA: Chronic | ICD-10-CM

## 2021-04-12 DIAGNOSIS — G56.03 BILATERAL CARPAL TUNNEL SYNDROME: Chronic | ICD-10-CM

## 2021-04-12 PROCEDURE — 99442 PR PHYS/QHP TELEPHONE EVALUATION 11-20 MIN: CPT | Performed by: NURSE PRACTITIONER

## 2021-04-19 PROBLEM — G56.00 CARPAL TUNNEL SYNDROME: Chronic | Status: ACTIVE | Noted: 2020-05-12

## 2021-04-19 PROBLEM — J98.01 BRONCHOSPASM: Chronic | Status: ACTIVE | Noted: 2021-04-19

## 2021-04-19 PROBLEM — M81.0 OSTEOPOROSIS, SENILE: Chronic | Status: ACTIVE | Noted: 2018-06-12

## 2021-04-19 PROBLEM — I44.1 MOBITZ TYPE 2 SECOND DEGREE AV BLOCK: Chronic | Status: ACTIVE | Noted: 2021-02-23

## 2021-04-19 PROBLEM — R55 SYNCOPE AND COLLAPSE: Status: RESOLVED | Noted: 2020-11-16 | Resolved: 2021-04-19

## 2021-04-19 PROBLEM — J98.01 BRONCHOSPASM: Status: ACTIVE | Noted: 2021-04-19
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
## 2021-04-19 PROBLEM — I47.10 PSVT (PAROXYSMAL SUPRAVENTRICULAR TACHYCARDIA): Chronic | Status: ACTIVE | Noted: 2021-02-23

## 2021-04-19 PROBLEM — I49.1 APC (ATRIAL PREMATURE CONTRACTIONS): Chronic | Status: ACTIVE | Noted: 2021-01-13

## 2021-04-19 PROBLEM — I47.1 PSVT (PAROXYSMAL SUPRAVENTRICULAR TACHYCARDIA) (HCC): Chronic | Status: ACTIVE | Noted: 2021-02-23

## 2021-04-19 NOTE — ASSESSMENT & PLAN NOTE
Hypertension is unchanged.  Continue current treatment regimen.  Dietary sodium restriction.  Blood pressure will be reassessed at the next regular appointment.  Continue home BP monitoring

## 2021-04-19 NOTE — PROGRESS NOTES
"Chief Complaint  Hypertension, Hypothyroidism, and Hyperlipidemia    Subjective          Sylvia Stover presents to [facility] for f/u regarding HTN, hypothyroidism and hyperlipidemia.    You have chosen to receive care through a telephone visit. Do you consent to use a telephone visit for your medical care today? Yes    She has been released from neurosurgery due to resolution of subarachnoid hemorrhage, last CT scan December 2020.  She denies any recent falls.  Her metoprolol was recently discontinued by cardiology which was felt to be a factor in her syncopal episode (bradycardia on medication) .  Her blood pressure has remained stable on amlodipine 10mg qd and Lisinopril 20mg qd.  She is currently managed on famotidine daily due to a history of an ulcer.  She denies any dyspepsia or abdominal pain.  She reports feeling well and denies myalgias with the use of simvastatin.      Hypertension  This is a chronic problem. The current episode started more than 1 year ago. The problem is unchanged. The problem is controlled. Pertinent negatives include no headaches, palpitations, peripheral edema or shortness of breath. Current antihypertension treatment includes calcium channel blockers and ACE inhibitors. The current treatment provides significant improvement. There are no compliance problems.        Objective   Vital Signs:   Ht 154.9 cm (61\")   BMI 26.26 kg/m²     Physical Exam  Psychiatric:         Behavior: Behavior normal.         Thought Content: Thought content normal.         Judgment: Judgment normal.        Result Review :   The following data was reviewed by: JUNE Mohr on 04/12/2021:  Common labs    Common Labsle 11/16/20 11/16/20 11/17/20 11/17/20 11/18/20    1516 1516 0549 0549    Glucose  105 (A)  100 (A) 91   BUN  25 (A)  19 21   Creatinine  1.23 (A)  0.98 1.16 (A)   eGFR Non  Am  42 (A)  55 (A) 45 (A)   Sodium  135 (A)  135 (A) 138   Potassium  4.6  " 4.3 4.8   Chloride  99  103 103   Calcium  10.0  8.9 9.2   Albumin  4.60      Total Bilirubin  0.4      Alkaline Phosphatase  60      AST (SGOT)  19      ALT (SGPT)  15      WBC 6.23  5.40     Hemoglobin 13.5  12.0     Hematocrit 40.7  35.2     Platelets 210  201     (A) Abnormal value       Comments are available for some flowsheets but are not being displayed.           Data reviewed: Consultant notes neurosurgery 12/2020          Assessment and Plan    Diagnoses and all orders for this visit:    1. Essential hypertension (Primary)  Assessment & Plan:  Hypertension is unchanged.  Continue current treatment regimen.  Dietary sodium restriction.  Blood pressure will be reassessed at the next regular appointment.  Continue home BP monitoring      2. Bronchospasm  Comments:  seasonal, takes Pulmicort with good relief of symptoms  Orders:  -     budesonide (PULMICORT) 90 MCG/ACT inhaler; Inhale 1 puff 2 (Two) Times a Day As Needed (shortness of breath). Rinse and spit after use  Dispense: 3 each; Refill: 1    3. Stage 3 chronic kidney disease, unspecified whether stage 3a or 3b CKD (CMS/[facility])  Assessment & Plan:  Followed by Dr. Spence      4. Osteoporosis, senile  Assessment & Plan:  Currently managed on Prolia injections      5. Bilateral carpal tunnel syndrome  Assessment & Plan:  She had right CTS surgery 3/11, planning for left in future      6. Postoperative hypothyroidism  Assessment & Plan:  Currently managed on Synthroid, recheck TSH at next visit      7. Pure hypercholesterolemia  Assessment & Plan:  She is tolerating simvastatin without myalgias, repeat lipid panel at next visit.    History and medical judgement obtained from video conversation with patient. No hands-on physical examination was performed. Approximately 16 minutes spent in video conversation with patient.      Follow Up   No follow-ups on file.  Patient was given instructions and counseling regarding her condition or for health maintenance  advice. Please see specific information pulled into the AVS if appropriate.

## 2021-04-19 NOTE — ASSESSMENT & PLAN NOTE
[facility]    Hospitalist Progress Note    Date of Service (when I saw the patient): 07/01/2017    Assessment & Plan   Nhan Marc is a 75 year old male with h/o gout, arthritis and SCC of the ear who was admitted on 6/30/2017 by Ortho for an infected right hip arthroplasty.       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   Patient underwent surgery 6/30/17 with Dr. Witt. Patient was under general anesthesia with an EBL of 2400 cc.    - POD # 1  - Postoperative cares including IV fluids, DVT prophylaxis, transfusions and pain control per orthopedic surgery.   - Infectious Disease has been consulted for further antibiotic guidance.    - Hip cultures pending.  Thus far all negative including gram stains.   - Tissue path per Ortho op note shows acute inflammation.   - Could consider sending fluid for crystals.   - Continues on Vanco only presently.  Received Ancef perioperatively 6/30 and 7/1.   - Afebrile.      H/o Gout with Right foot pain.   Patient has had four days of edematous right foot. Reportedly red in am of admit but has since returned to normal. Patient feels this may be gout despite limited pain.   - No obvious tenderness to palpation.  + Pedal edema. Some blanchable erythema on the lateral aspect of the foot near the little toe. No joint pain.   - If this was an acute gout flare it is resolving and not needing acute treatment.   - Low suspicion for infection. Keep elevated as able and apply ice as needed.     Acute blood loss anemia.   Patient had EBL of 2400 cc intraoperatively. Postoperative hemoglobin 9.0. Pre-op was 12.6.    - Hgb 7.4 today.   - Follow repeat later today. Transfuse for less than 7.       DVT Prophylaxis: Defer to primary service  Code Status: Full Code     Disposition:  Per Ortho. Awaiting cultures.       Joseluis Hatch       Interval History   Xu any present  She is tolerating simvastatin without myalgias, repeat lipid panel at next visit.   complaints.  Hip pain controlled unless he moves. Denies R foot pain. No BM, + Flatus.       -Data reviewed today: I reviewed all new labs and imaging results over the last 24 hours. I personally reviewed no images or EKG's today.    Physical Exam   Temp: 98.7  F (37.1  C) Temp src: Oral BP: 107/48   Heart Rate: 76 Resp: 16 SpO2: 95 % O2 Device: None (Room air) Oxygen Delivery: 1.5 LPM  Vitals:    06/30/17 1203   Weight: 86.2 kg (190 lb)     Vital Signs with Ranges  Temp:  [96.9  F (36.1  C)-98.7  F (37.1  C)] 98.7  F (37.1  C)  Heart Rate:  [70-86] 76  Resp:  [12-16] 16  BP: (104-159)/(48-98) 107/48  SpO2:  [95 %-100 %] 95 %  I/O last 3 completed shifts:  In: 5010 [P.O.:710; I.V.:3550]  Out: 3660 [Urine:1260; Blood:2400]    Gen: Patient in no acute distress.  Appears comfortable.  Heart:  S1S2+, regular rate and rhythm, No murmurs.  Lungs:  Clear to auscultation, no wheezing, no rales.   Abdomen:  Soft, non tender, mildly  distended, bowel sounds positive.  Extremities:  Pedal edema. Some blanchable erythema on the lateral aspect of the foot near the little toe. No joint pain.    Medications     NaCl Stopped (07/01/17 1244)       vancomycin (VANCOCIN) IV  1,250 mg Intravenous Q12H     sodium chloride (PF)  3 mL Intracatheter Q8H     enoxaparin  40 mg Subcutaneous Q24H     acetaminophen  975 mg Oral Q8H     ketorolac  15 mg Intravenous Q6H     senna-docusate  1-2 tablet Oral BID       Data     Recent Labs  Lab 07/01/17  0647 06/30/17  1811 06/30/17  1621 06/30/17  1218   HGB 7.4* 9.0* 10.5* 12.6*   PLT  --   --   --  329     --  139  --    POTASSIUM 4.2  --  4.1  --    CHLORIDE 105  --   --   --    CO2 24  --   --   --    BUN 17  --   --   --    CR 0.85  --   --  0.68   ANIONGAP 8  --   --   --    IVNANA 7.6*  --   --   --    *  --   --   --        Recent Results (from the past 24 hour(s))   XR Pelvis w Hip Port Right 1 View    Narrative    PELVIS AND RIGHT HIP PORTABLE ONE VIEW   6/30/2017 6:36 PM      HISTORY: Postop hip arthroplasty.    COMPARISON: None.      Impression    IMPRESSION: There is a right total hip prosthesis in position. No  evidence for fracture. The patient has also undergone prior left-sided  prosthesis placement. Bony pelvis appears intact.    ASHELY MATHEWS MD

## 2021-04-20 DIAGNOSIS — E78.5 HYPERLIPIDEMIA, UNSPECIFIED HYPERLIPIDEMIA TYPE: ICD-10-CM

## 2021-04-20 RX ORDER — SIMVASTATIN 20 MG
20 TABLET ORAL
Qty: 90 TABLET | Refills: 0 | Status: SHIPPED | OUTPATIENT
Start: 2021-04-20 | End: 2021-06-28

## 2021-06-02 DIAGNOSIS — E03.9 ACQUIRED HYPOTHYROIDISM: ICD-10-CM

## 2021-06-02 DIAGNOSIS — G89.29 CHRONIC MIDLINE THORACIC BACK PAIN: Primary | ICD-10-CM

## 2021-06-02 DIAGNOSIS — M54.6 CHRONIC MIDLINE THORACIC BACK PAIN: Primary | ICD-10-CM

## 2021-06-02 RX ORDER — LEVOTHYROXINE SODIUM 0.1 MG/1
100 TABLET ORAL DAILY
Qty: 90 TABLET | Refills: 1 | Status: SHIPPED | OUTPATIENT
Start: 2021-06-02 | End: 2021-10-18 | Stop reason: DRUGHIGH

## 2021-06-22 ENCOUNTER — TREATMENT (OUTPATIENT)
Dept: PHYSICAL THERAPY | Facility: CLINIC | Age: 81
End: 2021-06-22

## 2021-06-22 DIAGNOSIS — M40.04 POSTURAL KYPHOSIS OF THORACIC REGION: ICD-10-CM

## 2021-06-22 DIAGNOSIS — M54.6 CHRONIC BILATERAL THORACIC BACK PAIN: ICD-10-CM

## 2021-06-22 DIAGNOSIS — M54.50 LOW BACK PAIN AT MULTIPLE SITES: Primary | ICD-10-CM

## 2021-06-22 DIAGNOSIS — G89.29 CHRONIC BILATERAL THORACIC BACK PAIN: ICD-10-CM

## 2021-06-22 PROCEDURE — 97110 THERAPEUTIC EXERCISES: CPT | Performed by: PHYSICAL THERAPIST

## 2021-06-22 PROCEDURE — 97162 PT EVAL MOD COMPLEX 30 MIN: CPT | Performed by: PHYSICAL THERAPIST

## 2021-06-22 NOTE — PROGRESS NOTES
Physical Therapy Initial Evaluation and Plan of Care      Patient: Sylvia Stover   : 1940  Diagnosis/ICD-10 Code:  Low back pain at multiple sites [M54.5]  Referring practitioner: No ref. provider found  Date of Initial Visit: 2021  Today's Date: 2021  Patient seen for 1 sessions           Subjective Questionnaire: Oswestry:  To complete next visit      Subjective Evaluation    History of Present Illness  Mechanism of injury: Patient reports LBP and thoracic pain.  States she sometimes feels like she is hunched over because she needs to see her feet and the ground for balance and safety.  Patient had several falls over the years and is worried about falling again.  States she will use an AD to walk outside on uneven terrain but does not use one indoors.      States her LBP is worst when standing and walking for greater than 30'. Worst when standing at sink washing dishes. Relieved with sitting and rest. After 15 minutes of rest, patient's LBP feels better. States she Reports OA in most joints.  Feels best at end of day.     Reports History of fall: 2020, tore gluteus stella and iliopsoas. Had home health PT for several months. Continues with HEP.  PSHx:  B cataracts surgery 2014 - R carpal tunnel surgery  TKA, B, 2014  L foot/toe surgery 2018  Multiple bunionectomies and foot surgeries . 5 years ago.       Patient Occupation: retired   Precautions and Work Restrictions: decreased dynamic balanceQuality of life: good    Pain  Current pain ratin  Location: LB and upper back.  Also in C-spine with lateral flexion.  Quality: dull ache  Relieving factors: change in position, heat, rest and relaxation  Aggravating factors: ambulation and standing  Progression: worsening    Social Support  Lives in: multiple-level home  Lives with: alone    Hand dominance: right    Diagnostic Tests  Bone scan: abnormal    Patient Goals  Patient goals for therapy: decreased pain, increased motion,  improved balance and increased strength  Patient goal: Patient wants to be able to stand upright without pain.           Objective          Static Posture   General Observations  Flexed and guarded.     Head  Forward.    Shoulders  Rounded.    Thoracic Spine  Hyperkyphosis.    Lumbar Spine   Flattened.     Pelvis   Posterior pelvic tilt    Palpation     Additional Palpation Details  Tender along L paraspinals and upper thoracic paraspinals    Trunk strength 3+/5 overall    Cervical/Thoracic Screen   Cervical range of motion within normal limits with the following exceptions: All planes limited  Thoracic range of motion within normal limits with the following exceptions: All planes limited        Neurological Testing     Sensation     Ankle/Foot   Left Ankle/Foot   Diminished: proprioception    Right Ankle/Foot   Diminished: proprioception    Reflexes   Left   Patellar (L4): trace (1+)  Achilles (S1): trace (1+)    Right   Patellar (L4): trace (1+)  Achilles (S1): trace (1+)    Additional Neurological Details  Intact to light touch B Le's  B hip strength:  flexion 4- on L, 4 on R,  Hip extension 4/5 B   hip IR on L 3/5, 3+/5 on R    Knee strength:  Knee extension 4- B,   flexion 3+ on Left, 4- on right    ankle  DF 4/5 B  PF 3+/5 B  History of B TKA and foot surgeries, possibly contributing to diminished proprioceptive input, contributing to forward flexed posture for visual input and compensation in gait due to fear of falling.    Active Range of Motion     Additional Active Range of Motion Details  All planes limited  By 75% in thoracic and lumbar spine, except flexion.  Patient maintains a flexed posture.    Scapular Mobility     Additional Scapular Mobility Details  Decreased scapular mobility in all planes.  Patient needed VC and MC's to activate scapular stabilizers, has decreased UE ROM with lack of scapular mobility.  Remains in a fully protracted and downward rotated position    Ambulation     Observational  Gait   Decreased walking speed, stride length, left stance time, right stance time, left swing time, right swing time, left step length and right step length.   Left foot contact pattern: foot flat  Right foot contact pattern: foot flat  Left arm swing: decreased  Right arm swing: decreased    Additional Observational Gait Details  Patient looks downward when walking for fear of falling.                Assessment & Plan     Assessment  Impairments: abnormal gait, abnormal muscle tone, abnormal or restricted ROM, activity intolerance, impaired balance, impaired physical strength and safety issue  Assessment details: Patient's overall balance is poor due to lack of ROM of ROM restrictions of entire spine and B hips, decreased proprioception in B LE's and fear avoidance behavior with ambulation.   Prognosis: fair  Functional Limitations: carrying objects, walking, uncomfortable because of pain, moving in bed, standing, reaching behind back and reaching overhead  Goals  Plan Goals: LUMBAR PROBLEMS:  1. The patient has complaints of pain.   LTG 1: 8 weeks:  The patient will report lower back  and thoracic pain no greater than 1/10 in order to improve tolerance with activities of daily living and improve sleep quality.    STATUS:  New   STG 1a: 4 weeks:  The patient will report lower back pain no greater than 2/10 in order to be able to improve exercise tolerance with AM work outs.     STATUS:  New   STb.: 2 weeks:   Patient will be independent in HEP and learn self management techniques for pain and ROM.    STATUS: New   TREATMENT:  Manual therapy, therapeutic exercise, home exercise instruction, aquatic therapy, pelvic traction, and modalities as needed to include: electrical stimulation, ultrasound, moist heat, and ice.     2. Mobility: Walking/Moving Around Functional Limitation    LTG 2a: 8 weeks: The pateint will be able to ambulate indoor community distances without exacerbation of symptoms.    STATUS:  New   STG 2b: 4weeks: The pateint will be able to ambulate household distances without exacerbation of symptoms.    STATUS: New    LTG 3b: 8 weeks:  The patient will demonstrate 10% limitation by achieving a score of 5/50 on the MIK.    STATUS:  New   TREATMENT:  Manual therapy, therapeutic exercise, home exercise instruction, and modalities as needed to include: moist heat, electrical stimulation, and ultrasound.    HIP AND LE PROBLEMS  1. The patient has limited ROM of the B hips   LTG 1: 8 weeks:  The patient will demonstrate full AROM of ROM in B hips in order to allow patient to squat and climb stairs without limitation.    STATUS:     STG 1a: 4 weeks:  The patient will demonstrate 50 % improvement of AROM for the B hips    STATUS:     TREATMENT: Manual therapy, therapeutic exercise, home exercise instruction, and modalities as needed to include:  moist heat, electrical stimulation, ultrasound, and ice.    2. The patient has limited strength of the trunk, B hips, and B knees.   LTG 2: 8 weeks: The patient will demonstrate 4+/5 strength for B LE's,B hips, and trunk, in order to allow patient improved joint stability    STATUS:    STG 2a: 4 weeks: The patient will demonstrate 4/5 strength for B LE's and trunk.     STATUS:    TREATMENT: Manual therapy, therapeutic exercise, home exercise instruction, aquatic therapy, and modalities as needed to include:  moist heat, electrical stimulation, ultrasound, and ice.    Plan  Therapy options: will be seen for skilled physical therapy services  Planned modality interventions: ultrasound and TENS  Planned therapy interventions: abdominal trunk stabilization, balance/weight-bearing training, functional ROM exercises, flexibility, gait training, home exercise program, IADL retraining, joint mobilization, manual therapy, motor coordination training, neuromuscular re-education, postural training, soft tissue mobilization, spinal/joint mobilization, strengthening, stretching,  therapeutic activities and transfer training  Frequency: 3x week  Duration in weeks: 10  Treatment plan discussed with: patient  Plan details: Patient to be seen 1-3x/wk x 8 weeks.  Patient to begin PT more times per week and taper down to 1x/wk withprogress in safety and independence in HEP.        Manual Therapy:         mins  58173;  Therapeutic Exercise:    15     mins  62517;     Neuromuscular Julián:        mins  37385;    Therapeutic Activity:          mins  89321;     Gait Training:           mins  40773;     Ultrasound:          mins  14907;    Electrical Stimulation:         mins  55778 ( );      Timed Treatment:  15    mins   Total Treatment:  60      mins    PT SIGNATURE: Susan Martinez, PT   DATE TREATMENT INITIATED: 6/22/2021    Medicare Initial Certification  Certification Period: 9/20/2021  I certify that the therapy services are furnished while this patient is under my care.  The services outlined above are required by this patient, and will be reviewed every 90 days.     PHYSICIAN:       DATE:     Please sign and return via fax to [phone].. Thank you, [facility].

## 2021-06-25 ENCOUNTER — TREATMENT (OUTPATIENT)
Dept: PHYSICAL THERAPY | Facility: CLINIC | Age: 81
End: 2021-06-25

## 2021-06-25 DIAGNOSIS — M40.04 POSTURAL KYPHOSIS OF THORACIC REGION: ICD-10-CM

## 2021-06-25 DIAGNOSIS — G89.29 CHRONIC BILATERAL THORACIC BACK PAIN: ICD-10-CM

## 2021-06-25 DIAGNOSIS — M54.50 LOW BACK PAIN AT MULTIPLE SITES: Primary | ICD-10-CM

## 2021-06-25 DIAGNOSIS — M54.6 CHRONIC BILATERAL THORACIC BACK PAIN: ICD-10-CM

## 2021-06-25 PROCEDURE — 97110 THERAPEUTIC EXERCISES: CPT | Performed by: PHYSICAL THERAPIST

## 2021-06-25 PROCEDURE — 97140 MANUAL THERAPY 1/> REGIONS: CPT | Performed by: PHYSICAL THERAPIST

## 2021-06-25 NOTE — PROGRESS NOTES
"Physical Therapy Daily Progress Note        Patient: Sylvia Stover   : 1940  Diagnosis/ICD-10 Code:  Low back pain at multiple sites [M54.5]  Referring practitioner: JUNE López  Date of Initial Visit: Type: THERAPY  Noted: 2021  Today's Date: 2021  Patient seen for 2 sessions             Subjective   Sylvia Stover reports: back has been \"fine\", she has not been doing many dishes.  States LBP begins after 30-45 minutes of standing.  Denies any LBP currently    Objective   Fullness noted over right mid T-spine with an apparent scoliosis noted at same level  Lumbar flexion to mid tibia  Extension to mid glute max  Lateral flexion to the right produce hip pain on right, LF to the left felt tightness only  See Exercise, Manual, and Modality Logs for complete treatment.       Assessment/Plan  Patient tolerated treatment well today.  Patient was able to tolerate thoracic and lumbar mobilizations and had noted improvement in lumbar forward and back vila bending.  Progress per Plan of Care           Timed:  Manual Therapy:    15     mins  36369;  Therapeutic Exercise:    15     mins  11800;     Neuromuscular Julián:        mins  86339;    Therapeutic Activity:          mins  63484;     Gait Training:           mins  52357;     Ultrasound:          mins  62114;    Electrical Stimulation:         mins  77596;  Iontophoresis          mins  33120    Untimed:  Electrical Stimulation:         mins  72509 ( );  Mechanical Traction:         mins  48979;   Fluidotherapy          mins  08338    Timed Treatment:   30   mins   Total Treatment:     30   mins        Bonifacio Martinez PT  Physical Therapist   "

## 2021-06-26 DIAGNOSIS — E78.5 HYPERLIPIDEMIA, UNSPECIFIED HYPERLIPIDEMIA TYPE: ICD-10-CM

## 2021-06-28 RX ORDER — SIMVASTATIN 20 MG
20 TABLET ORAL
Qty: 90 TABLET | Refills: 3 | Status: SHIPPED | OUTPATIENT
Start: 2021-06-28 | End: 2022-01-19 | Stop reason: SDUPTHER

## 2021-07-02 ENCOUNTER — TREATMENT (OUTPATIENT)
Dept: PHYSICAL THERAPY | Facility: CLINIC | Age: 81
End: 2021-07-02

## 2021-07-02 DIAGNOSIS — M54.50 LOW BACK PAIN AT MULTIPLE SITES: Primary | ICD-10-CM

## 2021-07-02 DIAGNOSIS — G89.29 CHRONIC BILATERAL THORACIC BACK PAIN: ICD-10-CM

## 2021-07-02 DIAGNOSIS — M54.6 CHRONIC BILATERAL THORACIC BACK PAIN: ICD-10-CM

## 2021-07-02 DIAGNOSIS — M40.04 POSTURAL KYPHOSIS OF THORACIC REGION: ICD-10-CM

## 2021-07-02 PROCEDURE — 97140 MANUAL THERAPY 1/> REGIONS: CPT | Performed by: PHYSICAL THERAPIST

## 2021-07-02 PROCEDURE — 97110 THERAPEUTIC EXERCISES: CPT | Performed by: PHYSICAL THERAPIST

## 2021-07-02 NOTE — PROGRESS NOTES
Physical Therapy Daily Progress Note        Patient: Sylvia Stover   : 1940  Diagnosis/ICD-10 Code:  Low back pain at multiple sites [M54.5]  Referring practitioner: JUNE López  Date of Initial Visit: Type: THERAPY  Noted: 2021  Today's Date: 2021  Patient seen for 3 sessions             Subjective   Sylvia Stover reports: she is feeling about the same, occasionally better then feels like she returns to baseline.  She also had some issues with doing her new exercises correctly.  Doing her HEP 2 x per day.    Objective     See Exercise, Manual, and Modality Logs for complete treatment.       Assessment/Plan  Patient tolerated treatment well, she did not have any increase in her sx's except while stretching  Progress strengthening /stabilization /functional activity           Timed:  Manual Therapy:    10     mins  77741;  Therapeutic Exercise:    30     mins  68402;     Neuromuscular Julián:        mins  26214;    Therapeutic Activity:          mins  78806;     Gait Training:           mins  22640;     Ultrasound:          mins  04570;    Electrical Stimulation:         mins  93302;  Iontophoresis          mins  32945    Untimed:  Electrical Stimulation:         mins  91566 ( );  Mechanical Traction:         mins  16767;   Fluidotherapy          mins  12655    Timed Treatment:   40   mins   Total Treatment:     40   mins        Bonifacio Martinez PT  Physical Therapist

## 2021-07-06 ENCOUNTER — TREATMENT (OUTPATIENT)
Dept: PHYSICAL THERAPY | Facility: CLINIC | Age: 81
End: 2021-07-06

## 2021-07-06 PROCEDURE — 97110 THERAPEUTIC EXERCISES: CPT | Performed by: PHYSICAL THERAPIST

## 2021-07-06 PROCEDURE — 97140 MANUAL THERAPY 1/> REGIONS: CPT | Performed by: PHYSICAL THERAPIST

## 2021-07-06 NOTE — PROGRESS NOTES
Physical Therapy Daily Progress Note      Patient: Sylvia Stover   : 1940  Diagnosis/ICD-10 Code:  No primary diagnosis found.  Referring practitioner: JUNE López  Date of Initial Visit: Type: THERAPY  Noted: 2021  Today's Date: 2021  Patient seen for 4 sessions           Subjective Reports feeling pretty good and will drop downt to 1x/wk.  Patient had a lot of questions regarding HEP. All HEP reviewed today and all questions were answered/     Objective   See Exercise, Manual, and Modality Logs for complete treatment.       Assessment/Plan Patient has stiffness in T and L spine.  She responds well to ROM techniques.        PLAN: Progress per Plan of Care             Manual Therapy:    12     mins  33939;  Therapeutic Exercise:    22     mins  58924;     Neuromuscular Julián:        mins  68199;    Therapeutic Activity:          mins  98716;     Gait Training:           mins  62132;     Ultrasound:          mins  27054;    Electrical Stimulation:         mins  67453 ( );    Timed Treatment:   34   mins   Total Treatment:     34   mins    Susan Martinez, PT  Physical Therapist

## 2021-07-08 ENCOUNTER — TREATMENT (OUTPATIENT)
Dept: PHYSICAL THERAPY | Facility: CLINIC | Age: 81
End: 2021-07-08

## 2021-07-08 DIAGNOSIS — M40.04 POSTURAL KYPHOSIS OF THORACIC REGION: ICD-10-CM

## 2021-07-08 DIAGNOSIS — M54.6 CHRONIC BILATERAL THORACIC BACK PAIN: ICD-10-CM

## 2021-07-08 DIAGNOSIS — G89.29 CHRONIC BILATERAL THORACIC BACK PAIN: ICD-10-CM

## 2021-07-08 DIAGNOSIS — M54.50 LOW BACK PAIN AT MULTIPLE SITES: Primary | ICD-10-CM

## 2021-07-08 PROCEDURE — 97140 MANUAL THERAPY 1/> REGIONS: CPT | Performed by: PHYSICAL THERAPIST

## 2021-07-08 PROCEDURE — 97110 THERAPEUTIC EXERCISES: CPT | Performed by: PHYSICAL THERAPIST

## 2021-07-08 NOTE — PROGRESS NOTES
Physical Therapy Daily Progress Note      Patient: Sylvia Stover   : 1940  Diagnosis/ICD-10 Code:  Low back pain at multiple sites [M54.5]  Referring practitioner: JUNE López  Date of Initial Visit: Type: THERAPY  Noted: 2021  Today's Date: 2021  Patient seen for 4 sessions           Subjective Pt had some questions regarding HEP.  All questions answered.   Reports very little back pain today.        Objective   See Exercise, Manual, and Modality Logs for complete treatment.       Assessment/Plan  Pt tolerating treatment well.       PLAN: Progress per Plan of Care             Manual Therapy:    10     mins  70277;  Therapeutic Exercise:    20     mins  83252;     Neuromuscular Julián:        mins  39953;    Therapeutic Activity:          mins  11062;     Gait Training:           mins  57852;     Ultrasound:          mins  58214;    Electrical Stimulation:         mins  82146 ( );    Timed Treatment:   30   mins   Total Treatment:     30   mins    Susan Martinez, PT  Physical Therapist

## 2021-07-16 ENCOUNTER — TREATMENT (OUTPATIENT)
Dept: PHYSICAL THERAPY | Facility: CLINIC | Age: 81
End: 2021-07-16

## 2021-07-16 DIAGNOSIS — G89.29 CHRONIC BILATERAL THORACIC BACK PAIN: ICD-10-CM

## 2021-07-16 DIAGNOSIS — M54.6 CHRONIC BILATERAL THORACIC BACK PAIN: ICD-10-CM

## 2021-07-16 DIAGNOSIS — M40.04 POSTURAL KYPHOSIS OF THORACIC REGION: ICD-10-CM

## 2021-07-16 DIAGNOSIS — M54.50 LOW BACK PAIN AT MULTIPLE SITES: Primary | ICD-10-CM

## 2021-07-16 PROCEDURE — 97110 THERAPEUTIC EXERCISES: CPT | Performed by: PHYSICAL THERAPIST

## 2021-07-16 NOTE — PROGRESS NOTES
Physical Therapy Daily Progress Note        Patient: Sylvia Stover   : 1940  Diagnosis/ICD-10 Code:  Low back pain at multiple sites [M54.5]  Referring practitioner: JUNE López  Date of Initial Visit: Type: THERAPY  Noted: 2021  Today's Date: 2021  Patient seen for 6 sessions             Subjective    Sylvia Stover reports: low back is feeling about the same.  States she does not feel any worse, denies any aggravation with the HEP.    Objective   Pain level 0/10  See Exercise, Manual, and Modality Logs for complete treatment.       Assessment/Plan  Patient has not seen a significant improvement in her LBP with prolonged standing and has been compliant with her HEP. Plan to move to more core strengthening  Progress per Plan of Care           Timed:  Manual Therapy:         mins  84824;  Therapeutic Exercise:    30     mins  43567;     Neuromuscular Julián:        mins  17152;    Therapeutic Activity:          mins  61914;     Gait Training:           mins  06385;     Ultrasound:          mins  15411;    Electrical Stimulation:         mins  51726;  Iontophoresis          mins  46992    Untimed:  Electrical Stimulation:         mins  99346 ( );  Mechanical Traction:         mins  22241;   Fluidotherapy          mins  72857    Timed Treatment:  30    mins   Total Treatment:     30   mins        Bonifacio Martinez PT  Physical Therapist

## 2021-08-16 ENCOUNTER — OFFICE VISIT (OUTPATIENT)
Dept: INTERNAL MEDICINE | Facility: CLINIC | Age: 81
End: 2021-08-16

## 2021-08-16 VITALS
RESPIRATION RATE: 16 BRPM | DIASTOLIC BLOOD PRESSURE: 78 MMHG | BODY MASS INDEX: 25.71 KG/M2 | OXYGEN SATURATION: 97 % | HEIGHT: 61 IN | HEART RATE: 74 BPM | WEIGHT: 136.2 LBS | SYSTOLIC BLOOD PRESSURE: 132 MMHG | TEMPERATURE: 96.8 F

## 2021-08-16 DIAGNOSIS — M79.675 GREAT TOE PAIN, LEFT: Primary | ICD-10-CM

## 2021-08-16 DIAGNOSIS — N18.30 STAGE 3 CHRONIC KIDNEY DISEASE, UNSPECIFIED WHETHER STAGE 3A OR 3B CKD (HCC): Chronic | ICD-10-CM

## 2021-08-16 LAB
DEPRECATED RDW RBC AUTO: 44.5 FL (ref 37–54)
ERYTHROCYTE [DISTWIDTH] IN BLOOD BY AUTOMATED COUNT: 13.4 % (ref 12.3–15.4)
HCT VFR BLD AUTO: 37.9 % (ref 34–46.6)
HGB BLD-MCNC: 12.8 G/DL (ref 12–15.9)
MCH RBC QN AUTO: 31.4 PG (ref 26.6–33)
MCHC RBC AUTO-ENTMCNC: 33.8 G/DL (ref 31.5–35.7)
MCV RBC AUTO: 92.9 FL (ref 79–97)
PLATELET # BLD AUTO: 240 10*3/MM3 (ref 140–450)
PMV BLD AUTO: 9.2 FL (ref 6–12)
RBC # BLD AUTO: 4.08 10*6/MM3 (ref 3.77–5.28)
WBC # BLD AUTO: 6 10*3/MM3 (ref 3.4–10.8)

## 2021-08-16 PROCEDURE — 99214 OFFICE O/P EST MOD 30 MIN: CPT | Performed by: NURSE PRACTITIONER

## 2021-08-16 PROCEDURE — 85027 COMPLETE CBC AUTOMATED: CPT | Performed by: NURSE PRACTITIONER

## 2021-08-16 PROCEDURE — 36415 COLL VENOUS BLD VENIPUNCTURE: CPT | Performed by: NURSE PRACTITIONER

## 2021-08-16 NOTE — ASSESSMENT & PLAN NOTE
Sx due to gout vs. OA vs. soft tissue injury, sx resolved without previous hx of similar sx. Check uric acid today, will begin Allopurinol with elevated levels.

## 2021-08-16 NOTE — PATIENT INSTRUCTIONS
Low-Purine Eating Plan  A low-purine eating plan involves making food choices to limit your intake of purine. Purine is a kind of uric acid. Too much uric acid in your blood can cause certain conditions, such as gout and kidney stones. Eating a low-purine diet can help control these conditions.  What are tips for following this plan?  Reading food labels    · Avoid foods with saturated or Trans fat.  · Check the ingredient list of grains-based foods, such as bread and cereal, to make sure that they contain whole grains.  · Check the ingredient list of sauces or soups to make sure they do not contain meat or fish.  · When choosing soft drinks, check the ingredient list to make sure they do not contain high-fructose corn syrup.  Shopping  · Buy plenty of fresh fruits and vegetables.  · Avoid buying canned or fresh fish.  · Buy dairy products labeled as low-fat or nonfat.  · Avoid buying premade or processed foods. These foods are often high in fat, salt (sodium), and added sugar.  Cooking  · Use olive oil instead of butter when cooking. Oils like olive oil, canola oil, and sunflower oil contain healthy fats.  Meal planning  · Learn which foods do or do not affect you. If you find out that a food tends to cause your gout symptoms to flare up, avoid eating that food. You can enjoy foods that do not cause problems. If you have any questions about a food item, talk with your dietitian or health care provider.  · Limit foods high in fat, especially saturated fat. Fat makes it harder for your body to get rid of uric acid.  · Choose foods that are lower in fat and are lean sources of protein.  General guidelines  · Limit alcohol intake to no more than 1 drink a day for nonpregnant women and 2 drinks a day for men. One drink equals 12 oz of beer, 5 oz of wine, or 1½ oz of hard liquor. Alcohol can affect the way your body gets rid of uric acid.  · Drink plenty of water to keep your urine clear or pale yellow. Fluids can help  remove uric acid from your body.  · If directed by your health care provider, take a vitamin C supplement.  · Work with your health care provider and dietitian to develop a plan to achieve or maintain a healthy weight. Losing weight can help reduce uric acid in your blood.  What foods are recommended?  The items listed may not be a complete list. Talk with your dietitian about what dietary choices are best for you.  Foods low in purines  Foods low in purines do not need to be limited. These include:  · All fruits.  · All low-purine vegetables, pickles, and olives.  · Breads, pasta, rice, cornbread, and popcorn. Cake and other baked goods.  · All dairy foods.  · Eggs, nuts, and nut butters.  · Spices and condiments, such as salt, herbs, and vinegar.  · Plant oils, butter, and margarine.  · Water, sugar-free soft drinks, tea, coffee, and cocoa.  · Vegetable-based soups, broths, sauces, and gravies.  Foods moderate in purines  Foods moderate in purines should be limited to the amounts listed.  · ½ cup of asparagus, cauliflower, spinach, mushrooms, or green peas, each day.  · 2/3 cup uncooked oatmeal, each day.  · ¼ cup dry wheat bran or wheat germ, each day.  · 2-3 ounces of meat or poultry, each day.  · 4-6 ounces of shellfish, such as crab, lobster, oysters, or shrimp, each day.  · 1 cup cooked beans, peas, or lentils, each day.  · Soup, broths, or bouillon made from meat or fish. Limit these foods as much as possible.  What foods are not recommended?  The items listed may not be a complete list. Talk with your dietitian about what dietary choices are best for you.  Limit your intake of foods high in purines, including:  · Beer and other alcohol.  · Meat-based gravy or sauce.  · Canned or fresh fish, such as:  ? Anchovies, sardines, herring, and tuna.  ? Mussels and scallops.  ? Codfish, trout, and tyler.  · Haney.  · Organ meats, such as:  ? Liver or kidney.  ? Tripe.  ? Sweetbreads (thymus gland or  pancreas).  · Wild game or goose.  · Yeast or yeast extract supplements.  · Drinks sweetened with high-fructose corn syrup.  Summary  · Eating a low-purine diet can help control conditions caused by too much uric acid in the body, such as gout or kidney stones.  · Choose low-purine foods, limit alcohol, and limit foods high in fat.  · You will learn over time which foods do or do not affect you. If you find out that a food tends to cause your gout symptoms to flare up, avoid eating that food.  This information is not intended to replace advice given to you by your health care provider. Make sure you discuss any questions you have with your health care provider.  Document Revised: 11/30/2018 Document Reviewed: 01/31/2018  [employer] Patient Education © 2021 [employer]

## 2021-08-16 NOTE — PROGRESS NOTES
"Chief Complaint  Toe Pain    Subjective          Sylvia Stover presents to [facility] due to left great toe pain.    She woke up Sunday morning with a severe pain in her left great toe. She states the pain was severe and persistent, denies recent injury or trauma. She took 2 Tylenol twice and reports improvement by mid-day on Sunday. She has not had any pain since then, denies hx gout. She describes pain as a \"throbbing\" sensation.      Objective   Vital Signs:   /78 (BP Location: Left arm, Patient Position: Sitting, Cuff Size: Adult)   Pulse 74   Temp 96.8 °F (36 °C) (Temporal)   Resp 16   Ht 154.9 cm (61\")   Wt 61.8 kg (136 lb 3.2 oz)   SpO2 97%   BMI 25.73 kg/m²     Physical Exam  Constitutional:       Appearance: She is well-developed. She is not ill-appearing.   HENT:      Head: Normocephalic.      Right Ear: Hearing, tympanic membrane and external ear normal.      Left Ear: Hearing, tympanic membrane and external ear normal.      Nose: Nose normal. No nasal deformity, mucosal edema or rhinorrhea.      Right Sinus: No maxillary sinus tenderness or frontal sinus tenderness.      Left Sinus: No maxillary sinus tenderness or frontal sinus tenderness.      Mouth/Throat:      Dentition: Normal dentition.   Eyes:      General: Lids are normal.         Right eye: No discharge.         Left eye: No discharge.      Conjunctiva/sclera: Conjunctivae normal.      Right eye: No exudate.     Left eye: No exudate.  Neck:      Thyroid: No thyroid mass or thyromegaly.      Vascular: No carotid bruit.      Trachea: Trachea normal.   Cardiovascular:      Rate and Rhythm: Regular rhythm.      Pulses: Normal pulses.      Heart sounds: Normal heart sounds. No murmur heard.     Pulmonary:      Effort: No respiratory distress.      Breath sounds: Normal breath sounds. No decreased breath sounds, wheezing, rhonchi or rales.   Musculoskeletal:      Cervical back: Normal range of motion. No " edema.      Left foot: Normal range of motion.   Feet:      Comments: Mild erythema left distal great toe without open wound or laceration  Lymphadenopathy:      Head:      Right side of head: No submental, submandibular, tonsillar, preauricular, posterior auricular or occipital adenopathy.      Left side of head: No submental, submandibular, tonsillar, preauricular, posterior auricular or occipital adenopathy.   Skin:     General: Skin is warm and dry.      Nails: There is no clubbing.   Neurological:      Mental Status: She is alert.   Psychiatric:         Behavior: Behavior is cooperative.        Result Review :   The following data was reviewed by: JUNE Mohr on 08/16/2021:  Common labs    Common Labsle 11/17/20 11/17/20 11/18/20 8/16/21    0549 0549     Glucose  100 (A) 91    BUN  19 21    Creatinine  0.98 1.16 (A)    eGFR Non African Am  55 (A) 45 (A)    Sodium  135 (A) 138    Potassium  4.3 4.8    Chloride  103 103    Calcium  8.9 9.2    WBC 5.40   6.00   Hemoglobin 12.0   12.8   Hematocrit 35.2   37.9   Platelets 201   240   (A) Abnormal value            Data reviewed: Consultant notes nephrology 5/2021          Assessment and Plan    Diagnoses and all orders for this visit:    1. Great toe pain, left (Primary)  Assessment & Plan:  Sx due to gout vs. OA vs. soft tissue injury, sx resolved without previous hx of similar sx. Check uric acid today, will begin Allopurinol with elevated levels.    Orders:  -     Uric Acid  -     CBC (No Diff); Future  -     CBC (No Diff)    2. Stage 3 chronic kidney disease, unspecified whether stage 3a or 3b CKD (CMS/[facility])  Assessment & Plan:  She is followed by nephrology, CKD stable with recent labs        Follow Up   Return if symptoms worsen or fail to improve, for Next scheduled follow up.  Patient was given instructions and counseling regarding her condition or for health maintenance advice. Please see specific information pulled into the AVS if appropriate.

## 2021-08-17 ENCOUNTER — TREATMENT (OUTPATIENT)
Dept: PHYSICAL THERAPY | Facility: CLINIC | Age: 81
End: 2021-08-17

## 2021-08-17 DIAGNOSIS — M54.6 CHRONIC BILATERAL THORACIC BACK PAIN: ICD-10-CM

## 2021-08-17 DIAGNOSIS — M40.04 POSTURAL KYPHOSIS OF THORACIC REGION: ICD-10-CM

## 2021-08-17 DIAGNOSIS — G89.29 CHRONIC BILATERAL THORACIC BACK PAIN: ICD-10-CM

## 2021-08-17 DIAGNOSIS — M54.50 LOW BACK PAIN AT MULTIPLE SITES: Primary | ICD-10-CM

## 2021-08-17 LAB — URATE SERPL-MCNC: 5.7 MG/DL (ref 2.4–5.7)

## 2021-08-17 PROCEDURE — G0283 ELEC STIM OTHER THAN WOUND: HCPCS | Performed by: PHYSICAL THERAPIST

## 2021-08-17 PROCEDURE — 97140 MANUAL THERAPY 1/> REGIONS: CPT | Performed by: PHYSICAL THERAPIST

## 2021-08-17 PROCEDURE — 97110 THERAPEUTIC EXERCISES: CPT | Performed by: PHYSICAL THERAPIST

## 2021-08-17 NOTE — PROGRESS NOTES
Physical Therapy Daily Progress Note / Recertification        Patient: Sylvia Stover   : 1940  Diagnosis/ICD-10 Code:  Low back pain at multiple sites [M54.5]  Referring practitioner: JUNE López  Date of Initial Visit: Type: THERAPY  Noted: 2021  Today's Date: 2021  Physical Therapy Initial Evaluation and Plan of Care      Patient: Sylvia Stover   : 1940  Diagnosis/ICD-10 Code:  Low back pain at multiple sites [M54.5]  Referring practitioner: JUNE López  Date of Initial Visit: 2021  Today's Date: 2021  Patient seen for 7 sessions           Subjective Questionnaire: Oswestry:  To complete next visit      Subjective Evaluation    Sylvia Stover reports: she is feeling a little better since last visit which was over 30 days ago.  She has been able to continue to exercise with walking and LE strengthening exercises and has continued with her HEP.   Patient had a health issue that she had to address prior to returning to physical therapy and then she had a 2 week delay in returning to the clinic.    Pain  Current pain ratin  Location: LB and upper back.  Also in C-spine with lateral flexion.  Quality: dull ache  Relieving factors: change in position, heat, rest and relaxation  Aggravating factors: ambulation and standing  Progression: worsening    Patient Goals  Patient goals for therapy: decreased pain, increased motion, improved balance and increased strength  Patient goal: Patient wants to be able to stand upright without pain.    Objective        Static Posture   General Observations  Flexed and guarded.     Head  Forward.    Shoulders  Rounded.    Thoracic Spine  Hyperkyphosis.    Lumbar Spine   Flattened.     Pelvis   Posterior pelvic tilt    Palpation     Additional Palpation Details  Tender along L paraspinals and upper thoracic paraspinals    Trunk strength 4/5 overall    Cervical/Thoracic Screen   Cervical range of motion within normal limits with the  following exceptions: All planes limited  Thoracic range of motion within normal limits with the following exceptions: All planes limited        Strength:  Hip strength:  Flexion, extension, abduction, IR, ER nataly 4/5    Knee strength:  Knee extension 4/5 B,   flexion 4/5 on Left, 4/5 on right    Ankle strength  DF 4+/5 B  PF 4/5 B  History of B TKA and foot surgeries, possibly contributing to diminished proprioceptive input, contributing to forward flexed posture for visual input and compensation in gait due to fear of falling.    Active Range of Motion     Additional Active Range of Motion Details  All planes limited  By 60% in thoracic and lumbar spine, except flexion.  Patient maintains a flexed posture.    Ambulation     Observational Gait   Decreased walking speed, stride length, left stance time, right stance time, left swing time, right swing time, left step length and right step length.   Left foot contact pattern: foot flat  Right foot contact pattern: foot flat  Left arm swing: decreased  Right arm swing: decreased       Assessment & Plan     Assessment  Impairments: abnormal gait, abnormal muscle tone, abnormal or restricted ROM, activity intolerance, impaired balance, impaired physical strength and safety issue    Assessment details: 82 y/o Female with c/o low back pain and LE weakness with fear of falling.  Patient has noted improvement in strength and hip ROM but has not seen an improvement in her low back pain.  Patient's overall balance is fair due to lack of strength and ROM restrictions of entire spine and B hips, decreased proprioception in B LE's and fear avoidance behavior with ambulation.   Prognosis: fair+  Functional Limitations: carrying objects, walking, uncomfortable because of pain, moving in bed, standing, reaching behind back and reaching overhead  Goals  Plan Goals: LUMBAR PROBLEMS:  1. The patient has complaints of pain.   LTG 1: 8 weeks:  The patient will report lower back  and  thoracic pain no greater than 1/10 in order to improve tolerance with activities of daily living and improve sleep quality.    STATUS:  Ongoing   STG 1a: 4 weeks:  The patient will report lower back pain no greater than 2/10 in order to be able to improve exercise tolerance with AM work outs.     STATUS:  Met   STb.: 2 weeks:   Patient will be independent in HEP and learn self management techniques for pain and ROM.    STATUS: Met     2. Mobility: Walking/Moving Around Functional Limitation    LTG 2a: 8 weeks: The pateint will be able to ambulate indoor community distances without exacerbation of symptoms.    STATUS: Ongoing   STG 2b: 4weeks: The pateint will be able to ambulate household distances without exacerbation of symptoms.    STATUS: Met   LTG 3b: 8 weeks:  The patient will demonstrate 10% limitation by achieving a score of 5/50 on the MIK.    STATUS:  Not met but progressing well, now is 17% 8/45    HIP AND LE PROBLEMS  1. The patient has limited ROM of the B hips   LTG 1: 8 weeks:  The patient will demonstrate full AROM of ROM in B hips in order to allow patient to squat and climb stairs without limitation.    STATUS:  Ongoing   STG 1a: 4 weeks:  The patient will demonstrate 50 % improvement of AROM for the B hips    STATUS:  Met      2. The patient has limited strength of the trunk, B hips, and B knees.   LTG 2: 8 weeks: The patient will demonstrate 4+/5 strength for B LE's,B hips, and trunk, in order to allow patient improved joint stability    STATUS:    STG 2a: 4 weeks: The patient will demonstrate 4/5 strength for B LE's and trunk.     STATUS:    TREATMENT: Manual therapy, therapeutic exercise, home exercise instruction, aquatic therapy, and modalities as needed to include:  moist heat, electrical stimulation, ultrasound, and ice.    Plan  Therapy options: will be seen for skilled physical therapy services  Planned modality interventions: ultrasound and TENS  Planned therapy interventions:  abdominal trunk stabilization, balance/weight-bearing training, functional ROM exercises, flexibility, gait training, home exercise program, IADL retraining, joint mobilization, manual therapy, motor coordination training, neuromuscular re-education, postural training, soft tissue mobilization, spinal/joint mobilization, strengthening, stretching, therapeutic activities and transfer training  Frequency: 2x week  Duration in weeks: 6  Treatment plan discussed with: patient  Plan details: Patient to be seen 1-2x/wk x 8 weeks.  Patient to begin PT more times per week and taper down to 1x/wk withprogress in safety and independence in HEP.           Patient is very compliant with her HEP and noted reduction in her trigger points following Estim treatment today  Progress per Plan of Care           Timed:  Manual Therapy:    8     mins  88433;  Therapeutic Exercise:    22     mins  45195;     Neuromuscular Julián:        mins  05578;    Therapeutic Activity:          mins  18436;     Gait Training:           mins  13115;     Ultrasound:          mins  16014;    Electrical Stimulation:         mins  14215;  Iontophoresis          mins  56393    Untimed:  Electrical Stimulation:    20     mins  44201 ( );  Mechanical Traction:         mins  82479;   Fluidotherapy          mins  02120    Timed Treatment:   30   mins   Total Treatment:     50   mins        Bonifacio Martinez, MARY  Physical Therapist

## 2021-09-02 ENCOUNTER — TREATMENT (OUTPATIENT)
Dept: PHYSICAL THERAPY | Facility: CLINIC | Age: 81
End: 2021-09-02

## 2021-09-02 DIAGNOSIS — G89.29 CHRONIC BILATERAL THORACIC BACK PAIN: ICD-10-CM

## 2021-09-02 DIAGNOSIS — M54.50 LOW BACK PAIN AT MULTIPLE SITES: Primary | ICD-10-CM

## 2021-09-02 DIAGNOSIS — M54.6 CHRONIC BILATERAL THORACIC BACK PAIN: ICD-10-CM

## 2021-09-02 DIAGNOSIS — M40.04 POSTURAL KYPHOSIS OF THORACIC REGION: ICD-10-CM

## 2021-09-02 PROCEDURE — 97110 THERAPEUTIC EXERCISES: CPT | Performed by: PHYSICAL THERAPIST

## 2021-09-02 PROCEDURE — 97140 MANUAL THERAPY 1/> REGIONS: CPT | Performed by: PHYSICAL THERAPIST

## 2021-09-02 NOTE — PROGRESS NOTES
Physical Therapy Daily Progress Note      Patient: Sylvia Stover   : 1940  Diagnosis/ICD-10 Code:  Low back pain at multiple sites [M54.5]  Referring practitioner: JUNE López  Date of Initial Visit: Type: THERAPY  Noted: 2021  Today's Date: 2021  Patient seen for 8 sessions           Subjective Pt reports she is pleased that now she can stand and walk for about an hour before needing to sit to relieve LBP.  States she has no LBP right now, and it comes on after being on her feet for more than an hour.  States she is compliant with her HEP daily.     Objective   See Exercise, Manual, and Modality Logs for complete treatment.   Gait training today for increased stride length and dung to improve balance.  Pt exhibits a fear avoidance behavior with ambulation and tends to slow down, guard and decrease stride length.     ---A small very painful mass was noted adjacent to T12/L1 on the right.  When palpating, this area, pressure to this mass did not refer or exhibit a pain behavior like a muscle in spasm in the paraspinals.  Recommended patient to discuss this and have it examined by MD on her next visit 2021.    Assessment/Plan  Pt is exhibiting a more normal gait pattern after PT today.  She was able to demonstrate all exercises in HEP well.     PLAN: Progress per Plan of Care             Manual Therapy:    15     mins  39219;  Therapeutic Exercise:    25     mins  25743;     Neuromuscular Julián:        mins  88229;    Therapeutic Activity:          mins  38104;     Gait Training:           mins  80336;     Ultrasound:          mins  14721;    Electrical Stimulation:         mins  66417 ( );    Timed Treatment:   40   mins   Total Treatment:     40 mins    Susan Martinez, PT  Physical Therapist

## 2021-09-09 ENCOUNTER — TREATMENT (OUTPATIENT)
Dept: PHYSICAL THERAPY | Facility: CLINIC | Age: 81
End: 2021-09-09

## 2021-09-09 DIAGNOSIS — M40.04 POSTURAL KYPHOSIS OF THORACIC REGION: ICD-10-CM

## 2021-09-09 DIAGNOSIS — M54.6 CHRONIC BILATERAL THORACIC BACK PAIN: ICD-10-CM

## 2021-09-09 DIAGNOSIS — G89.29 CHRONIC BILATERAL THORACIC BACK PAIN: ICD-10-CM

## 2021-09-09 DIAGNOSIS — M54.50 LOW BACK PAIN AT MULTIPLE SITES: Primary | ICD-10-CM

## 2021-09-09 PROCEDURE — 97032 APPL MODALITY 1+ESTIM EA 15: CPT | Performed by: PHYSICAL THERAPIST

## 2021-09-09 PROCEDURE — 97110 THERAPEUTIC EXERCISES: CPT | Performed by: PHYSICAL THERAPIST

## 2021-09-09 NOTE — PROGRESS NOTES
Physical Therapy Daily Progress Note      Patient: Sylvia Stover   : 1940  Diagnosis/ICD-10 Code:  Low back pain at multiple sites [M54.5]  Referring practitioner: JUNE López  Date of Initial Visit: Type: THERAPY  Noted: 2021  Today's Date: 2021  Patient seen for 9 sessions           Subjective Patient reports she fell down 4 steps in her house and has brusing over her right upper arm, right hand, left side of back and hurt her low back and neck.  States she is very sore but feels sure she did not need to go to ED.  States she is not sure why she fell. Reports the fall was 7 days ago and she is feeling much better since then.  She is continuing to do her HEP daily. She sees MD on Oct 13 and     Objective   See Exercise, Manual, and Modality Logs for complete treatment.   Pt is able to complete her HEP. Pt has several episodes of Left hamstring and hip flexor muscle cramps during exercises today.     Assessment/Plan Pt's fall down the steps has increased her pain level in LB, however, she is still gaining strength in the trunk stabilizers and willing to participate in PT.     PLAN: Progress per Plan of Care             Manual Therapy:         mins  18002;  Therapeutic Exercise:   15      mins  20862;     Neuromuscular Julián:        mins  62206;    Therapeutic Activity:          mins  46581;     Gait Training:           mins  04857;     Ultrasound:          mins  14428;    Electrical Stimulation:      15   mins  51194 ( );    Timed Treatment:   30   mins   Total Treatment:     30   mins    Susan Martinez, PT  Physical Therapist

## 2021-09-17 ENCOUNTER — TREATMENT (OUTPATIENT)
Dept: PHYSICAL THERAPY | Facility: CLINIC | Age: 81
End: 2021-09-17

## 2021-09-17 PROCEDURE — 97110 THERAPEUTIC EXERCISES: CPT | Performed by: PHYSICAL THERAPIST

## 2021-09-17 NOTE — PROGRESS NOTES
Re-Assessment / Re-Certification        Patient: Sylvia Stover   : 1940  Diagnosis/ICD-10 Code:  No primary diagnosis found.  Referring practitioner: JUNE López  Date of Initial Visit: No linked episodes  Today's Date: 2021  Patient seen for Visit count could not be calculated. Make sure you are using a visit which is associated with an episode. sessions      Subjective:   Sylvia Stover reports: her low back doing very well.  States she has been very compliant with her HEP and feels she is ready for discharge  Subjective Questionnaire: Oswestry: 7% limitation  Clinical Progress: improved  Home Program Compliance: Yes  Treatment has included: therapeutic exercise, neuromuscular re-education, manual therapy and therapeutic activity    Subjective   Objective   Assessment/Plan  Progress toward previous goals: All Met    New Goals: no new goals      Recommendations: Discharge  Timeframe: discharged  Prognosis to achieve goals: good    PT SIGNATURE: Bonifacio Martinez, PT   DATE TREATMENT INITIATED: 2021  Certification Period: 2021        Based upon review of the patient's progress and continued therapy plan, it is my medical opinion that Sylvia Stover should continue physical therapy treatment at [facility]  [address] [place] [address] [address] [place] [id number] [phone].    Signature: __________________________________  Rhoda Luna APRN    Timed:  Manual Therapy:         mins  17828;  Therapeutic Exercise:    25     mins  06577;     Neuromuscular Julián:        mins  60198;    Therapeutic Activity:          mins  63200;     Gait Training:           mins  64830;     Ultrasound:          mins  43081;    Electrical Stimulation:         mins  37604 ( );    Untimed:  Electrical Stimulation:         mins  60960 ( );  Mechanical Traction:         mins  36159;     Timed Treatment:   25   mins   Total Treatment:     30   mins

## 2021-09-22 ENCOUNTER — OFFICE VISIT (OUTPATIENT)
Dept: INTERNAL MEDICINE | Facility: CLINIC | Age: 81
End: 2021-09-22

## 2021-09-22 VITALS
OXYGEN SATURATION: 98 % | RESPIRATION RATE: 16 BRPM | WEIGHT: 136.6 LBS | DIASTOLIC BLOOD PRESSURE: 78 MMHG | SYSTOLIC BLOOD PRESSURE: 120 MMHG | HEART RATE: 72 BPM | BODY MASS INDEX: 25.79 KG/M2 | HEIGHT: 61 IN | TEMPERATURE: 98.2 F

## 2021-09-22 DIAGNOSIS — G89.29 CHRONIC BILATERAL THORACIC BACK PAIN: Primary | ICD-10-CM

## 2021-09-22 DIAGNOSIS — M54.6 CHRONIC BILATERAL THORACIC BACK PAIN: Primary | ICD-10-CM

## 2021-09-22 PROCEDURE — 99213 OFFICE O/P EST LOW 20 MIN: CPT | Performed by: NURSE PRACTITIONER

## 2021-09-22 RX ORDER — LISINOPRIL 20 MG/1
TABLET ORAL
Qty: 180 TABLET | Refills: 3 | Status: SHIPPED | OUTPATIENT
Start: 2021-09-22 | End: 2022-01-19 | Stop reason: SDUPTHER

## 2021-09-22 NOTE — PROGRESS NOTES
"Chief Complaint  Mass (back )    Subjective          Sylvia Stover presents to [facility] due to mid back pain.    She has a hx of chronic back pain with kyphosis for which she was referred to PT. She has done well with therapy and has exercises she can complete at home. However, there was concern over a painful mass at T12-L1 at her most recent visit so she has been worked in for evaluation.      Objective   Vital Signs:   /78 (BP Location: Left arm, Patient Position: Sitting, Cuff Size: Adult)   Pulse 72   Temp 98.2 °F (36.8 °C) (Temporal)   Resp 16   Ht 154.9 cm (61\")   Wt 62 kg (136 lb 9.6 oz)   SpO2 98%   BMI 25.81 kg/m²     Physical Exam  Constitutional:       Appearance: She is well-developed. She is not ill-appearing.   HENT:      Head: Normocephalic.      Right Ear: Hearing, tympanic membrane and external ear normal.      Left Ear: Hearing, tympanic membrane and external ear normal.      Nose: Nose normal. No nasal deformity, mucosal edema or rhinorrhea.      Right Sinus: No maxillary sinus tenderness or frontal sinus tenderness.      Left Sinus: No maxillary sinus tenderness or frontal sinus tenderness.      Mouth/Throat:      Dentition: Normal dentition.   Eyes:      General: Lids are normal.         Right eye: No discharge.         Left eye: No discharge.      Conjunctiva/sclera: Conjunctivae normal.      Right eye: No exudate.     Left eye: No exudate.  Neck:      Thyroid: No thyroid mass or thyromegaly.      Vascular: No carotid bruit.      Trachea: Trachea normal.   Cardiovascular:      Rate and Rhythm: Regular rhythm.      Pulses: Normal pulses.      Heart sounds: Normal heart sounds. No murmur heard.     Pulmonary:      Effort: No respiratory distress.      Breath sounds: Normal breath sounds. No decreased breath sounds, wheezing, rhonchi or rales.   Musculoskeletal:      Cervical back: Normal range of motion. No edema.      Thoracic back: No swelling, " spasms or tenderness.      Lumbar back: No swelling, spasms or tenderness.   Lymphadenopathy:      Head:      Right side of head: No submental, submandibular, tonsillar, preauricular, posterior auricular or occipital adenopathy.      Left side of head: No submental, submandibular, tonsillar, preauricular, posterior auricular or occipital adenopathy.   Skin:     General: Skin is warm and dry.      Nails: There is no clubbing.   Neurological:      Mental Status: She is alert.   Psychiatric:         Behavior: Behavior is cooperative.        Result Review :   The following data was reviewed by: JUNE Mohr on 09/22/2021:  Common labs    Common Labsle 11/17/20 11/17/20 11/18/20 8/16/21 8/16/21    0549 0549  1435 1437   Glucose  100 (A) 91     BUN  19 21     Creatinine  0.98 1.16 (A)     eGFR Non African Am  55 (A) 45 (A)     Sodium  135 (A) 138     Potassium  4.3 4.8     Chloride  103 103     Calcium  8.9 9.2     WBC 5.40    6.00   Hemoglobin 12.0    12.8   Hematocrit 35.2    37.9   Platelets 201    240   Uric Acid    5.7    (A) Abnormal value            Data reviewed: PT notes          Assessment and Plan    Diagnoses and all orders for this visit:    1. Chronic bilateral thoracic back pain (Primary)    Reviewed recent PT notes, no palpable masses or tenderness on exam. She will continue to monitor and we will re-evaluate at her appointment in October.      Follow Up   Return if symptoms worsen or fail to improve, for Next scheduled follow up.  Patient was given instructions and counseling regarding her condition or for health maintenance advice. Please see specific information pulled into the AVS if appropriate.

## 2021-10-13 ENCOUNTER — OFFICE VISIT (OUTPATIENT)
Dept: INTERNAL MEDICINE | Facility: CLINIC | Age: 81
End: 2021-10-13

## 2021-10-13 VITALS
DIASTOLIC BLOOD PRESSURE: 82 MMHG | HEART RATE: 72 BPM | WEIGHT: 137 LBS | HEIGHT: 61 IN | BODY MASS INDEX: 25.86 KG/M2 | TEMPERATURE: 97.5 F | OXYGEN SATURATION: 96 % | SYSTOLIC BLOOD PRESSURE: 122 MMHG

## 2021-10-13 DIAGNOSIS — I10 ESSENTIAL HYPERTENSION: Primary | Chronic | ICD-10-CM

## 2021-10-13 DIAGNOSIS — K21.9 GASTROESOPHAGEAL REFLUX DISEASE WITHOUT ESOPHAGITIS: ICD-10-CM

## 2021-10-13 DIAGNOSIS — N18.32 STAGE 3B CHRONIC KIDNEY DISEASE (HCC): ICD-10-CM

## 2021-10-13 DIAGNOSIS — E78.00 PURE HYPERCHOLESTEROLEMIA: Chronic | ICD-10-CM

## 2021-10-13 PROCEDURE — 99214 OFFICE O/P EST MOD 30 MIN: CPT | Performed by: NURSE PRACTITIONER

## 2021-10-13 PROCEDURE — 1159F MED LIST DOCD IN RCRD: CPT | Performed by: NURSE PRACTITIONER

## 2021-10-13 PROCEDURE — G0439 PPPS, SUBSEQ VISIT: HCPCS | Performed by: NURSE PRACTITIONER

## 2021-10-13 PROCEDURE — 1170F FXNL STATUS ASSESSED: CPT | Performed by: NURSE PRACTITIONER

## 2021-10-13 PROCEDURE — 1160F RVW MEDS BY RX/DR IN RCRD: CPT | Performed by: NURSE PRACTITIONER

## 2021-10-13 PROCEDURE — 1126F AMNT PAIN NOTED NONE PRSNT: CPT | Performed by: NURSE PRACTITIONER

## 2021-10-13 PROCEDURE — 1125F AMNT PAIN NOTED PAIN PRSNT: CPT | Performed by: NURSE PRACTITIONER

## 2021-10-13 NOTE — PROGRESS NOTES
The ABCs of the Annual Wellness Visit  Subsequent Medicare Wellness Visit    Chief Complaint   Patient presents with   • Medicare Wellness-subsequent   • Hypertension   • Hyperlipidemia   • Chronic Kidney Disease      Subjective    History of Present Illness:  Sylvia Stover is a 81 y.o. female who presents for a Subsequent Medicare Wellness Visit.    The following portions of the patient's history were reviewed and   updated as appropriate: allergies, current medications, past family history, past medical history, past social history, past surgical history and problem list.    Compared to one year ago, the patient feels her physical   health is the same.    Compared to one year ago, the patient feels her mental   health is the same.    Recent Hospitalizations:  This patient has had a [facility] admission record on file within the last 365 days.    Current Medical Providers:  Patient Care Team:  Rhoda Luna APRN as PCP - General (Internal Medicine)  Jadiel Spence MD as Consulting Physician (Nephrology)    Outpatient Medications Prior to Visit   Medication Sig Dispense Refill   • amLODIPine (NORVASC) 10 MG tablet Take 1 tablet by mouth Daily. 90 tablet 3   • budesonide (PULMICORT) 90 MCG/ACT inhaler Inhale 1 puff 2 (Two) Times a Day As Needed (shortness of breath). Rinse and spit after use 3 each 1   • Cholecalciferol (VITAMIN D) 2000 UNITS capsule Take 1,000 Units by mouth.     • diclofenac (VOLTAREN) 1 % gel gel Apply 4 g topically 4 (Four) Times a Day As Needed (pain). 300 g 1   • famotidine (PEPCID) 20 MG tablet Take 20 mg by mouth Daily.     • ferrous sulfate 325 (65 FE) MG tablet Take 325 mg by mouth 2 (two) times a day.     • lisinopril (PRINIVIL,ZESTRIL) 20 MG tablet TAKE 1 TABLET BY MOUTH  TWICE DAILY 180 tablet 3   • meclizine (ANTIVERT) 12.5 MG tablet Take 1 tablet by mouth 3 (Three) Times a Day As Needed for dizziness. 1-2 tablets every 8 hours as needed 30 tablet 0   • Multiple  "Vitamins-Minerals (MULTIVITAMIN PO) Take  by mouth.     • simvastatin (ZOCOR) 20 MG tablet TAKE 1 TABLET BY MOUTH  EVERY NIGHT AT BEDTIME 90 tablet 3   • levothyroxine (SYNTHROID, LEVOTHROID) 100 MCG tablet Take 1 tablet by mouth Daily. 90 tablet 1     No facility-administered medications prior to visit.       No opioid medication identified on active medication list. I have reviewed chart for other potential  high risk medication/s and harmful drug interactions in the elderly.          Aspirin is not on active medication list.  Aspirin use is not indicated based on review of current medical condition/s. Risk of harm outweighs potential benefits.  .    Patient Active Problem List   Diagnosis   • Postoperative hypothyroidism   • Essential hypertension   • Hyperlipidemia   • Stage 3 chronic kidney disease (HCC)   • Osteoarthritis of right knee   • Osteoporosis, senile   • APC (atrial premature contractions)   • Carpal tunnel syndrome   • Mobitz type 2 second degree AV block   • PSVT (paroxysmal supraventricular tachycardia) ([facility])   • Bronchospasm   • Great toe pain, left   • Gastroesophageal reflux disease without esophagitis     Advance Care Planning  Advance Directive is not on file.  ACP discussion was held with the patient during this visit. Patient has an advance directive (not in EMR), copy requested.          Objective    Vitals:    10/13/21 1349   BP: 122/82   BP Location: Left arm   Patient Position: Sitting   Cuff Size: Adult   Pulse: 72   Temp: 97.5 °F (36.4 °C)   TempSrc: Temporal   SpO2: 96%   Weight: 62.1 kg (137 lb)   Height: 154.9 cm (61\")   PainSc:   2   PainLoc: Generalized     BMI Readings from Last 1 Encounters:   10/13/21 25.89 kg/m²   BMI is above normal parameters. Recommendations include: nutrition counseling    Does the patient have evidence of cognitive impairment? No    Physical Exam  Lab Results   Component Value Date    CHLPL 130 10/13/2021    TRIG 174 (H) 10/13/2021    HDL 42 10/13/2021 "    LDL 59 10/13/2021    VLDL 29 10/13/2021            HEALTH RISK ASSESSMENT    Smoking Status:  Social History     Tobacco Use   Smoking Status Former Smoker   • Packs/day: 0.25   • Years: 0.00   • Pack years: 0.00   • Types: Cigarettes   • Start date: 1961   • Quit date:    • Years since quittin.8   Smokeless Tobacco Never Used   Tobacco Comment    My tobacco use varied from year to year.  When I quit I was smoking three cigarettes a week.     Alcohol Consumption:  Social History     Substance and Sexual Activity   Alcohol Use No   • Alcohol/week: 0.0 standard drinks    Comment: caffeine1 cup daily     Fall Risk Screen:    KYLAH Fall Risk Assessment was completed, and patient is at MODERATE risk for falls. Assessment completed on:10/13/2021    Depression Screening:  PHQ-2/PHQ-9 Depression Screening 10/13/2021   Little interest or pleasure in doing things 0   Feeling down, depressed, or hopeless 0   Trouble falling or staying asleep, or sleeping too much 0   Feeling tired or having little energy 0   Poor appetite or overeating 0   Feeling bad about yourself - or that you are a failure or have let yourself or your family down 0   Trouble concentrating on things, such as reading the newspaper or watching television 0   Moving or speaking so slowly that other people could have noticed. Or the opposite - being so fidgety or restless that you have been moving around a lot more than usual 0   Thoughts that you would be better off dead, or of hurting yourself in some way 0   Total Score 0   If you checked off any problems, how difficult have these problems made it for you to do your work, take care of things at home, or get along with other people? Not difficult at all       Health Habits and Functional and Cognitive Screening:  Functional & Cognitive Status 10/13/2021   Do you have difficulty preparing food and eating? No   Do you have difficulty bathing yourself, getting dressed or grooming yourself? No    Do you have difficulty using the toilet? No   Do you have difficulty moving around from place to place? No   Do you have trouble with steps or getting out of a bed or a chair? No   Current Diet Well Balanced Diet   Dental Exam Up to date   Eye Exam Up to date   Exercise (times per week) 6 times per week   Current Exercises Include Walking   Current Exercise Activities Include -   Do you need help using the phone?  No   Are you deaf or do you have serious difficulty hearing?  No   Do you need help with transportation? No   Do you need help shopping? No   Do you need help preparing meals?  No   Do you need help with housework?  No   Do you need help with laundry? No   Do you need help taking your medications? No   Do you need help managing money? No   Do you ever drive or ride in a car without wearing a seat belt? No   Have you felt unusual stress, anger or loneliness in the last month? No   Who do you live with? Alone   If you need help, do you have trouble finding someone available to you? No   Have you been bothered in the last four weeks by sexual problems? No   Do you have difficulty concentrating, remembering or making decisions? No       Age-appropriate Screening Schedule:  Refer to the list below for future screening recommendations based on patient's age, sex and/or medical conditions. Orders for these recommended tests are listed in the plan section. The patient has been provided with a written plan.    Health Maintenance   Topic Date Due   • MAMMOGRAM  04/13/2022 (Originally 11/7/2020)   • DXA SCAN  06/09/2022   • LIPID PANEL  10/13/2022   • TDAP/TD VACCINES (4 - Td or Tdap) 08/16/2030   • INFLUENZA VACCINE  Completed   • ZOSTER VACCINE  Completed              Assessment/Plan   CMS Preventative Services Quick Reference  Risk Factors Identified During Encounter  Fall Risk-High or Moderate  Immunizations Discussed/Encouraged (specific Immunizations; COVID19  The above risks/problems have been discussed with  the patient.  Follow up actions/plans if indicated are seen below in the Assessment/Plan Section.  Pertinent information has been shared with the patient in the After Visit Summary.    Diagnoses and all orders for this visit:    1. Essential hypertension (Primary)  Assessment & Plan:  Hypertension is unchanged.  Continue current treatment regimen.  Dietary sodium restriction.  Blood pressure will be reassessed at the next regular appointment.  She will continue Amlodipine and Lisinopril which she is tolerating well.    Orders:  -     CBC & Differential  -     Comprehensive Metabolic Panel  -     TSH    2. Pure hypercholesterolemia  Assessment & Plan:  She denies myalgias with simvastatin which she will continue along with a low-fat, low-cholesterol diet.    Orders:  -     Lipid Panel    3. Stage 3b chronic kidney disease (HCC)  Assessment & Plan:  She is followed by Dr. Spence, kidney function is stable.      4. Gastroesophageal reflux disease without esophagitis  Assessment & Plan:  Sx managed with Pepcid        Follow Up:   Return in about 6 months (around 4/13/2022).     An After Visit Summary and PPPS were made available to the patient.                   Answers for HPI/ROS submitted by the patient on 10/6/2021  What is the primary reason for your visit?: Physical

## 2021-10-14 LAB
ALBUMIN SERPL-MCNC: 4.9 G/DL (ref 3.5–5.2)
ALBUMIN/GLOB SERPL: 2 G/DL
ALP SERPL-CCNC: 66 U/L (ref 39–117)
ALT SERPL-CCNC: 14 U/L (ref 1–33)
AST SERPL-CCNC: 14 U/L (ref 1–32)
BASOPHILS # BLD AUTO: 0.09 10*3/MM3 (ref 0–0.2)
BASOPHILS NFR BLD AUTO: 1.5 % (ref 0–1.5)
BILIRUB SERPL-MCNC: 0.4 MG/DL (ref 0–1.2)
BUN SERPL-MCNC: 27 MG/DL (ref 8–23)
BUN/CREAT SERPL: 19.9 (ref 7–25)
CALCIUM SERPL-MCNC: 9.8 MG/DL (ref 8.6–10.5)
CHLORIDE SERPL-SCNC: 100 MMOL/L (ref 98–107)
CHOLEST SERPL-MCNC: 130 MG/DL (ref 0–200)
CO2 SERPL-SCNC: 23.1 MMOL/L (ref 22–29)
CREAT SERPL-MCNC: 1.36 MG/DL (ref 0.57–1)
EOSINOPHIL # BLD AUTO: 0.26 10*3/MM3 (ref 0–0.4)
EOSINOPHIL NFR BLD AUTO: 4.4 % (ref 0.3–6.2)
ERYTHROCYTE [DISTWIDTH] IN BLOOD BY AUTOMATED COUNT: 13 % (ref 12.3–15.4)
GLOBULIN SER CALC-MCNC: 2.4 GM/DL
GLUCOSE SERPL-MCNC: 94 MG/DL (ref 65–99)
HCT VFR BLD AUTO: 38.1 % (ref 34–46.6)
HDLC SERPL-MCNC: 42 MG/DL (ref 40–60)
HGB BLD-MCNC: 12.6 G/DL (ref 12–15.9)
IMM GRANULOCYTES # BLD AUTO: 0.04 10*3/MM3 (ref 0–0.05)
IMM GRANULOCYTES NFR BLD AUTO: 0.7 % (ref 0–0.5)
LDLC SERPL CALC-MCNC: 59 MG/DL (ref 0–100)
LYMPHOCYTES # BLD AUTO: 0.75 10*3/MM3 (ref 0.7–3.1)
LYMPHOCYTES NFR BLD AUTO: 12.7 % (ref 19.6–45.3)
MCH RBC QN AUTO: 31 PG (ref 26.6–33)
MCHC RBC AUTO-ENTMCNC: 33.1 G/DL (ref 31.5–35.7)
MCV RBC AUTO: 93.6 FL (ref 79–97)
MONOCYTES # BLD AUTO: 0.69 10*3/MM3 (ref 0.1–0.9)
MONOCYTES NFR BLD AUTO: 11.7 % (ref 5–12)
NEUTROPHILS # BLD AUTO: 4.08 10*3/MM3 (ref 1.7–7)
NEUTROPHILS NFR BLD AUTO: 69 % (ref 42.7–76)
NRBC BLD AUTO-RTO: 0 /100 WBC (ref 0–0.2)
PLATELET # BLD AUTO: 245 10*3/MM3 (ref 140–450)
POTASSIUM SERPL-SCNC: 4.6 MMOL/L (ref 3.5–5.2)
PROT SERPL-MCNC: 7.3 G/DL (ref 6–8.5)
RBC # BLD AUTO: 4.07 10*6/MM3 (ref 3.77–5.28)
SODIUM SERPL-SCNC: 135 MMOL/L (ref 136–145)
TRIGL SERPL-MCNC: 174 MG/DL (ref 0–150)
TSH SERPL DL<=0.005 MIU/L-ACNC: 0.11 UIU/ML (ref 0.27–4.2)
VLDLC SERPL CALC-MCNC: 29 MG/DL (ref 5–40)
WBC # BLD AUTO: 5.91 10*3/MM3 (ref 3.4–10.8)

## 2021-10-18 RX ORDER — LEVOTHYROXINE SODIUM 88 UG/1
88 TABLET ORAL DAILY
Qty: 90 TABLET | Refills: 1 | Status: SHIPPED | OUTPATIENT
Start: 2021-10-18 | End: 2021-12-07 | Stop reason: SDUPTHER

## 2021-10-31 PROBLEM — K21.9 GASTROESOPHAGEAL REFLUX DISEASE WITHOUT ESOPHAGITIS: Status: ACTIVE | Noted: 2021-10-31

## 2021-10-31 PROBLEM — S06.5XAA SUBDURAL HEMATOMA: Status: RESOLVED | Noted: 2020-08-18 | Resolved: 2021-10-31

## 2021-10-31 PROBLEM — K21.9 GASTROESOPHAGEAL REFLUX DISEASE WITHOUT ESOPHAGITIS: Chronic | Status: ACTIVE | Noted: 2021-10-31

## 2021-10-31 NOTE — ASSESSMENT & PLAN NOTE
She denies myalgias with simvastatin which she will continue along with a low-fat, low-cholesterol diet.

## 2021-10-31 NOTE — ASSESSMENT & PLAN NOTE
Hypertension is unchanged.  Continue current treatment regimen.  Dietary sodium restriction.  Blood pressure will be reassessed at the next regular appointment.  She will continue Amlodipine and Lisinopril which she is tolerating well.

## 2021-10-31 NOTE — PATIENT INSTRUCTIONS
Medicare Wellness  Personal Prevention Plan of Service     Date of Office Visit:  10/13/2021  Encounter Provider:  JNUE Mohr  Place of Service:  [facility]  Patient Name: Sylvia Stover  :  1940    As part of the Medicare Wellness portion of your visit today, we are providing you with this personalized preventive plan of services (PPPS). This plan is based upon recommendations of the [place] Preventive Services Task Force (USPSTF) and the Advisory Committee on Immunization Practices (ACIP).    This lists the preventive care services that should be considered, and provides dates of when you are due. Items listed as completed are up-to-date and do not require any further intervention.    Health Maintenance   Topic Date Due   • ANNUAL WELLNESS VISIT  2020   • COVID-19 Vaccine (3 - Pfizer booster) 2021   • MAMMOGRAM  2022 (Originally 2020)   • Pneumococcal Vaccine 65+ (2 of 2 - PPSV23) 2021   • DXA SCAN  2022   • LIPID PANEL  10/13/2022   • COLORECTAL CANCER SCREENING  2026   • TDAP/TD VACCINES (4 - Td or Tdap) 2030   • INFLUENZA VACCINE  Completed   • ZOSTER VACCINE  Completed       Orders Placed This Encounter   Procedures   • Comprehensive Metabolic Panel     Order Specific Question:   Release to patient     Answer:   Immediate     Order Specific Question:   [facility] Has the patient fasted?     Answer:   No   • Lipid Panel     Order Specific Question:   [facility] Has the patient fasted?     Answer:   No   • TSH     Order Specific Question:   Release to patient     Answer:   Immediate     Order Specific Question:   [facility] Has the patient fasted?     Answer:   No   • CBC & Differential     Order Specific Question:   Manual Differential     Answer:   No     Order Specific Question:   [facility] Has the patient fasted?     Answer:   No       Return in about 6 months (around 2022).

## 2021-10-31 NOTE — PROGRESS NOTES
"Chief Complaint  Medicare Wellness-subsequent, Hypertension, Hyperlipidemia, and Chronic Kidney Disease    Subjective          Sylvia Stover presents to [facility] for f/u regarding HTN, hypercholesterolemia and CKD.     She has completed PT for chronic low back pain and kyphosis which she tolerated well, has continued home exercises. She denies recent falls.   She reports feeling well, denies chest pain and/or shortness of breath.    Objective   Vital Signs:   /82 (BP Location: Left arm, Patient Position: Sitting, Cuff Size: Adult)   Pulse 72   Temp 97.5 °F (36.4 °C) (Temporal)   Ht 154.9 cm (61\")   Wt 62.1 kg (137 lb)   SpO2 96%   BMI 25.89 kg/m²     Physical Exam  Constitutional:       Appearance: She is well-developed. She is not ill-appearing.   HENT:      Head: Normocephalic.      Right Ear: Hearing, tympanic membrane and external ear normal.      Left Ear: Hearing, tympanic membrane and external ear normal.      Nose: Nose normal. No nasal deformity, mucosal edema or rhinorrhea.      Right Sinus: No maxillary sinus tenderness or frontal sinus tenderness.      Left Sinus: No maxillary sinus tenderness or frontal sinus tenderness.      Mouth/Throat:      Dentition: Normal dentition.   Eyes:      General: Lids are normal.         Right eye: No discharge.         Left eye: No discharge.      Conjunctiva/sclera: Conjunctivae normal.      Right eye: No exudate.     Left eye: No exudate.  Neck:      Thyroid: No thyroid mass or thyromegaly.      Vascular: No carotid bruit.      Trachea: Trachea normal.   Cardiovascular:      Rate and Rhythm: Regular rhythm.      Pulses: Normal pulses.      Heart sounds: Normal heart sounds. No murmur heard.      Pulmonary:      Effort: No respiratory distress.      Breath sounds: Normal breath sounds. No decreased breath sounds, wheezing, rhonchi or rales.   Abdominal:      General: Bowel sounds are normal.      Palpations: Abdomen is soft. "      Tenderness: There is no abdominal tenderness.   Musculoskeletal:      Cervical back: Normal range of motion. No edema.   Lymphadenopathy:      Head:      Right side of head: No submental, submandibular, tonsillar, preauricular, posterior auricular or occipital adenopathy.      Left side of head: No submental, submandibular, tonsillar, preauricular, posterior auricular or occipital adenopathy.   Skin:     General: Skin is warm and dry.      Nails: There is no clubbing.   Neurological:      Mental Status: She is alert.   Psychiatric:         Behavior: Behavior is cooperative.        Result Review :   The following data was reviewed by: JUNE Mohr on 10/13/2021:  Common labs    Common Labsle 11/18/20 8/16/21 8/16/21 10/13/21 10/13/21 10/13/21     1435 1437 1437 1437 1437   Glucose 91    94    BUN 21    27 (A)    Creatinine 1.16 (A)    1.36 (A)    eGFR Non  Am 45 (A)    37 (A)    eGFR  Am     45 (A)    Sodium 138    135 (A)    Potassium 4.8    4.6    Chloride 103    100    Calcium 9.2    9.8    Total Protein     7.3    Albumin     4.90    Total Bilirubin     0.4    Alkaline Phosphatase     66    AST (SGOT)     14    ALT (SGPT)     14    WBC   6.00 5.91     Hemoglobin   12.8 12.6     Hematocrit   37.9 38.1     Platelets   240 245     Total Cholesterol      130   Triglycerides      174 (A)   HDL Cholesterol      42   LDL Cholesterol       59   Uric Acid  5.7       (A) Abnormal value       Comments are available for some flowsheets but are not being displayed.                     Assessment and Plan    Diagnoses and all orders for this visit:    1. Essential hypertension (Primary)  Assessment & Plan:  Hypertension is unchanged.  Continue current treatment regimen.  Dietary sodium restriction.  Blood pressure will be reassessed at the next regular appointment.  She will continue Amlodipine and Lisinopril which she is tolerating well.    Orders:  -     CBC & Differential  -     Comprehensive  Metabolic Panel  -     TSH    2. Pure hypercholesterolemia  Assessment & Plan:  She denies myalgias with simvastatin which she will continue along with a low-fat, low-cholesterol diet.    Orders:  -     Lipid Panel    3. Stage 3b chronic kidney disease (HCC)  Assessment & Plan:  She is followed by Dr. Spence, kidney function is stable.      4. Gastroesophageal reflux disease without esophagitis  Assessment & Plan:  Sx managed with Pepcid        Follow Up   Return in about 6 months (around 4/13/2022).  Patient was given instructions and counseling regarding her condition or for health maintenance advice. Please see specific information pulled into the AVS if appropriate.

## 2021-11-03 DIAGNOSIS — E03.9 ACQUIRED HYPOTHYROIDISM: ICD-10-CM

## 2021-11-03 RX ORDER — LEVOTHYROXINE SODIUM 0.1 MG/1
100 TABLET ORAL DAILY
Qty: 90 TABLET | Refills: 3 | OUTPATIENT
Start: 2021-11-03

## 2021-12-01 ENCOUNTER — APPOINTMENT (OUTPATIENT)
Dept: GENERAL RADIOLOGY | Facility: HOSPITAL | Age: 81
End: 2021-12-01

## 2021-12-01 ENCOUNTER — HOSPITAL ENCOUNTER (EMERGENCY)
Facility: HOSPITAL | Age: 81
Discharge: HOME | End: 2021-12-01
Attending: EMERGENCY MEDICINE | Admitting: EMERGENCY MEDICINE

## 2021-12-01 ENCOUNTER — APPOINTMENT (OUTPATIENT)
Dept: CT IMAGING | Facility: HOSPITAL | Age: 81
End: 2021-12-01

## 2021-12-01 VITALS
RESPIRATION RATE: 16 BRPM | DIASTOLIC BLOOD PRESSURE: 90 MMHG | SYSTOLIC BLOOD PRESSURE: 132 MMHG | BODY MASS INDEX: 25.89 KG/M2 | OXYGEN SATURATION: 97 % | HEART RATE: 88 BPM | HEIGHT: 61 IN | TEMPERATURE: 97.6 F
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
## 2021-12-01 DIAGNOSIS — S01.81XA LACERATION OF FOREHEAD, INITIAL ENCOUNTER: ICD-10-CM

## 2021-12-01 DIAGNOSIS — S01.511A LIP LACERATION, INITIAL ENCOUNTER: ICD-10-CM

## 2021-12-01 DIAGNOSIS — S62.512A CLOSED FRACTURE OF BASE OF PROXIMAL PHALANX OF LEFT THUMB: ICD-10-CM

## 2021-12-01 DIAGNOSIS — S02.2XXA CLOSED FRACTURE OF NASAL BONE, INITIAL ENCOUNTER: ICD-10-CM

## 2021-12-01 PROCEDURE — 73140 X-RAY EXAM OF FINGER(S): CPT

## 2021-12-01 PROCEDURE — 99283 EMERGENCY DEPT VISIT LOW MDM: CPT

## 2021-12-01 PROCEDURE — 73130 X-RAY EXAM OF HAND: CPT

## 2021-12-01 PROCEDURE — 70486 CT MAXILLOFACIAL W/O DYE: CPT

## 2021-12-01 PROCEDURE — 72125 CT NECK SPINE W/O DYE: CPT

## 2021-12-01 PROCEDURE — 70450 CT HEAD/BRAIN W/O DYE: CPT

## 2021-12-01 RX ORDER — LIDOCAINE HYDROCHLORIDE AND EPINEPHRINE 10; 10 MG/ML; UG/ML
10 INJECTION, SOLUTION INFILTRATION; PERINEURAL ONCE
Status: COMPLETED | OUTPATIENT
Start: 2021-12-01 | End: 2021-12-01

## 2021-12-01 RX ADMIN — LIDOCAINE-EPINEPHRINE-TETRACAINE GEL 4-0.05-0.5% 3 ML: 4-0.05-0.5 GEL at 20:22

## 2021-12-01 RX ADMIN — LIDOCAINE HYDROCHLORIDE AND EPINEPHRINE 10 ML: 10; 10 INJECTION, SOLUTION INFILTRATION; PERINEURAL at 22:00

## 2021-12-01 NOTE — ED TRIAGE NOTES
.Pt masked on arrival, staff masked    Pt reports fall while getting into the car, hit face/head on concrete, laceration to forehead, abrasions to nose and cheek, lac above upper lip, abrasions to rt arm, pain to left thumb, denies loc

## 2021-12-02 NOTE — ED PROVIDER NOTES
EMERGENCY DEPARTMENT ENCOUNTER    Room Number:  04/04  Date of encounter:  12/1/2021  PCP: Rhoda Luna APRN  Historian: Patient      I used full protective equipment while examining this patient.  This includes face mask, gloves and protective eyewear.  I washed my hands before entering the room and immediately upon leaving the room      HPI:  Chief Complaint: Fall  A complete HPI/ROS/PMH/PSH/SH/FH are unobtainable due to: Nothing    Context: Sylvia Stover is a 81 y.o. female who presents to the ED c/o injuries sustained in a mechanical fall prior to arrival.  Patient states she lost her balance while trying to get in her car.  Patient fell face first onto concrete.  Patient denies any loss of consciousness, neck pain, dizziness, nausea, vomiting.  Patient does have laceration to her forehead, abrasions to her nose, and a laceration to her upper lip.  She denies any dental injury or malocclusion.  Patient also complains of pain to bilateral hands.  Tetanus shot last given in 2013.  Patient is not on any blood thinners.  She states she was in her normal state of health prior to this fall.    Review of Medical Records  Reviewed patient's last internal medicine office visit from 10/13/2021.  Patient being treated for hypertension, chronic kidney disease    PAST MEDICAL HISTORY  Active Ambulatory Problems     Diagnosis Date Noted   • Postoperative hypothyroidism    • Essential hypertension    • Hyperlipidemia    • Stage 3 chronic kidney disease (HCC)    • Osteoarthritis of right knee 10/29/2014   • Osteoporosis, senile 06/12/2018   • APC (atrial premature contractions) 01/13/2021   • Carpal tunnel syndrome 05/12/2020   • Mobitz type 2 second degree AV block 02/23/2021   • PSVT (paroxysmal supraventricular tachycardia) ([facility]) 02/23/2021   • Bronchospasm 04/19/2021   • Great toe pain, left 08/16/2021   • Gastroesophageal reflux disease without esophagitis 10/31/2021     Resolved Ambulatory Problems     Diagnosis  Date Noted   • Fall 08/16/2020   • Subdural hematoma (HCC) 08/18/2020   • Traumatic subarachnoid bleed with LOC of 30 minutes or less (HCC) 11/16/2020   • Syncope and collapse 11/16/2020     Past Medical History:   Diagnosis Date   • Age related osteoporosis    • Allergic 1996   • Anemia    • Cataract 2012   • Chronic gastritis    • Chronic renal insufficiency    • Gastric ulcer    • GERD (gastroesophageal reflux disease)    • Greater trochanteric bursitis    • H/O bone density study 11/2012   • H/O mammogram 10/28/2015   • HL (hearing loss)    • Hypertension    • Hypothyroidism    • Lumbago    • Osteoarthrosis    • Osteopenia    • PONV (postoperative nausea and vomiting)    • Renal insufficiency    • SAH (subarachnoid hemorrhage) ([facility])    • Sinus bradycardia          PAST SURGICAL HISTORY  Past Surgical History:   Procedure Laterality Date   • CARPAL TUNNEL RELEASE Right 03/11/2021   • COLONOSCOPY  07/06/2006   • COLONOSCOPY N/A 5/12/2016    Procedure: COLONOSCOPY into cecum/terminal ileum;  Surgeon: Huy Sam MD;  Location: [facility] ENDOSCOPY;  Service:    • ENDOSCOPY N/A 5/12/2016    Procedure: ESOPHAGOGASTRODUODENOSCOPY with biopsy;  Surgeon: Huy Sam MD;  Location: [facility] ENDOSCOPY;  Service:    • ENDOSCOPY N/A 8/12/2016    Procedure: ESOPHAGOGASTRODUODENOSCOPY WITH BIOPSIES (COLD);  Surgeon: Huy Sam MD;  Location: [facility] ENDOSCOPY;  Service:    • EYE SURGERY  2015    Cataract correction   • FOOT SURGERY     • JOINT REPLACEMENT  2014    knee replacements   • KNEE SURGERY     • PAP SMEAR  09/17/2013   • TONSILLECTOMY     • TOTAL THYROIDECTOMY     • UPPER GASTROINTESTINAL ENDOSCOPY  04/21/2014    Janene Sánchez Grade IV reflux esophagitis, gastric ulcer w/clean base, chronic gastritis, no h-pylori         FAMILY HISTORY  Family History   Problem Relation Age of Onset   • Colon cancer Mother    • Arthritis Mother    • Cancer Mother    • Hearing loss Mother    • Hyperlipidemia Mother    • Vision  loss Mother         Cataracts   • Kidney cancer Brother    • Alcohol abuse Brother    • Cancer Brother    • Early death Brother         Cancer   • Cancer Paternal Grandfather    • Heart disease Father    • Hyperlipidemia Father    • Thyroid disease Daughter          SOCIAL HISTORY  Social History     Socioeconomic History   • Marital status:    Tobacco Use   • Smoking status: Former Smoker     Packs/day: 0.25     Years: 0.00     Pack years: 0.00     Types: Cigarettes     Start date: 1961     Quit date:      Years since quittin.9   • Smokeless tobacco: Never Used   • Tobacco comment: My tobacco use varied from year to year.  When I quit I was smoking three cigarettes a week.   Substance and Sexual Activity   • Alcohol use: No     Alcohol/week: 0.0 standard drinks     Comment: caffeine1 cup daily   • Drug use: No   • Sexual activity: Never         ALLERGIES  Codeine and Metoprolol        REVIEW OF SYSTEMS  All systems reviewed and negative except for those discussed in HPI.       PHYSICAL EXAM    I have reviewed the triage vital signs and nursing notes.    ED Triage Vitals   Temp Heart Rate Resp BP SpO2   21 1725 21 1725 21 1725 21 1934 21 1725   97.6 °F (36.4 °C) 92 18 118/88 98 %      Temp src Heart Rate Source Patient Position BP Location FiO2 (%)   -- -- -- -- --              Physical Exam    GENERAL: Alert, oriented, not distressed  HENT: Stellate laceration to right forehead.  No significant hematoma.  Multiple lacerations and significant abrasions to upper lip above the vermilion border.  No dental injury.  Mild tenderness and abrasion to nose.  No active bleeding.  EYES: no scleral icterus, EOMI  CV: regular rhythm, regular rate, no murmur  RESPIRATORY: normal effort, CTA  ABDOMEN: soft, nontender  MUSCULOSKELETAL: Mild swelling, tenderness, ecchymosis to MCP joint of left thumb.  No wrist tenderness.  Right hand shows tenderness at the DIP joint of the right  fifth finger.  Atraumatic lower extremities.  NEURO: alert, moves all extremities, follows commands  SKIN: warm, dry    RADIOLOGY  CT Head Without Contrast, CT Cervical Spine Without Contrast, CT Facial Bones Without Contrast    Result Date: 12/1/2021  CT HEAD, FACE AND CERVICAL SPINE  HISTORY: Fall  COMPARISON: CT of the head and cervical spine dating back to 11/16/2020 and CT face 04/13/2016.  TECHNIQUE: CT was performed of the head. CT was performed of the cervical spine with axial, coronal, and sagittal reformatted images. No intravenous contrast was administered. Radiation dose reduction techniques were utilized, including automated exposure control and exposure modulation based on body size. CT face was performed with 1 mm slices.  FINDINGS:  CT head: There is no finding of acute infarct, hemorrhage, contusion or abnormal extra-axial collection. No hydrocephalus is present. Focus of hyperdensity along the anteroinferior aspect of the falx is grossly unchanged since 12/31/2020 Hypodensity within the periventricular and subcortical white matter likely represents moderate chronic ischemic white matter changes  Face: Linear lucency is present within the right nasal bone. There is also subtle cortical irregularity along the left nasal bone. Findings are new since 2011. There appears to be subcutaneous contusion overlying the frontal calvarium  CT cervical spine: There is no fracture or acute malalignment of the cervical spine. Multilevel moderate to severe degenerative changes are present,, most notably from C5 to C7. There is mild anterolisthesis of C4 on C5, as before. No prevertebral soft tissue swelling is present. Compression deformity of the T2 vertebral body is grossly unchanged since 11/16/2020.      Impression: 1.  No findings of acute intracranial abnormality.  No cervical spine fracture or acute malalignment. Multilevel degenerative changes within the cervical spine are present as detailed above. 2.   Linear lucency projecting through the right nasal bone is present and suggestive of a nondisplaced fracture. Suggestion of a mildly angulated fracture of the left nasal bone. Findings are new since 2011 but of indeterminate acuity given the lack of significant overlying soft tissue swelling. Correlation with patient history and point tenderness is recommended. 3.  Subcutaneous contusion over the frontal calvarium. 4.  Moderate chronic ischemic white matter changes. 5.  T2 compression deformity, grossly unchanged since 11/16/2020. 6.  Other findings as above.  This report was finalized on 12/1/2021 7:32 PM by Dr. Rashel Villafana M.D.      XR Finger 2+ View Left    Result Date: 12/1/2021  XR FINGER 2+ VW LEFT-  INDICATIONS: Trauma  TECHNIQUE: 3 views of the right  COMPARISON: None available  FINDINGS:  Fracture is apparent at the dorsal lateral margin of the base of the first proximal phalanx, with a smaller fracture fragment measuring 7 mm, and a larger fracture fragment that appears somewhat subluxed medially. Surrounding soft tissue swelling is present. No other fractures are identified. Osteoarthritic degenerative changes are conspicuous, predominantly interphalangeal joints and at the lateral aspect of the wrist.       Fracture at the base of the first proximal phalanx.  This report was finalized on 12/1/2021 6:00 PM by Dr. Joseluis Khan M.D.      XR Hand 3+ View Bilateral    Result Date: 12/1/2021  3 VIEWS BILATERAL HANDS  HISTORY: Fall with bilateral hand pain  COMPARISON: None available.   FINDINGS: Right hand: No acute fracture or subluxation of the right hand is seen. Exam is degraded by the patient's osteopenia. There are extensive degenerative changes noted throughout the hand, as well as involving the carpal bones. Most significant involvement appears to be at the proximal interphalangeal joint of the ring finger. There is soft tissue swelling seen overlying the dorsum of the hand at the level of the  carpal heads.  Left hand: There is a fracture involving the base of the proximal phalanx of the thumb. This does extend to the articular surface. In addition, there is deformity of the ulnar styloid, which is favored to represent additional fracture given the presence of adjacent soft tissue swelling. No additional fractures are identified, although the exam is degraded by the patient's osteopenia. There are advanced degenerative changes noted throughout the left hand, as well as at the radioulnar joint. Chondrocalcinosis is noted at the wrist. Patient also appears to have soft tissue swelling about the metacarpal heads, although this may be a chronic finding, related to extensive degenerative changes.       1. No acute fracture or subluxation of the right hand is identified. 2. Fracture of the base of the proximal phalanx of the left thumb. 2. Irregularity of the ulnar styloid, concerning for fracture, given the presence of adjacent soft tissue swelling.  This report was finalized on 12/1/2021 9:13 PM by Dr. Rochelle Sandoval M.D.        I ordered the above noted radiological studies. Reviewed by me and discussed with radiologist.  See dictation for official radiology interpretation.    Laceration Repair    Date/Time: 12/1/2021 10:55 PM  Performed by: Kun Matthews PA  Authorized by: Jerry Andrade MD     Consent:     Consent obtained:  Verbal    Consent given by:  Patient  Anesthesia (see MAR for exact dosages):     Anesthesia method:  Local infiltration and topical application    Topical anesthetic:  LET    Local anesthetic:  Lidocaine 1% WITH epi  Laceration details:     Location:  Face    Face location:  Forehead    Length (cm):  3  Repair type:     Repair type:  Complex  Pre-procedure details:     Preparation:  Imaging obtained to evaluate for foreign bodies  Exploration:     Hemostasis achieved with:  Epinephrine    Wound exploration: wound explored through full range of motion and entire depth of wound  probed and visualized      Contaminated: no    Treatment:     Area cleansed with:  Hibiclens    Amount of cleaning:  Standard    Irrigation solution:  Sterile saline    Debridement:  Moderate    Scar revision: yes    Subcutaneous repair:     Suture size:  5-0    Suture material:  Plain gut    Suture technique:  Simple interrupted    Number of sutures:  2  Skin repair:     Repair method:  Sutures    Suture size:  6-0    Suture material:  Nylon    Suture technique:  Running    Number of sutures:  8  Approximation:     Approximation:  Close  Post-procedure details:     Dressing:  Antibiotic ointment    Patient tolerance of procedure:  Tolerated well, no immediate complications  Laceration Repair    Date/Time: 12/1/2021 10:56 PM  Performed by: Kun Matthews PA  Authorized by: Jerry Andrade MD     Consent:     Consent obtained:  Verbal    Consent given by:  Patient  Anesthesia (see MAR for exact dosages):     Anesthesia method:  Local infiltration and topical application    Topical anesthetic:  LET    Local anesthetic:  Lidocaine 1% WITH epi  Laceration details:     Location:  Lip    Lip location:  Upper exterior lip    Length (cm):  2.6  Repair type:     Repair type:  Simple  Pre-procedure details:     Preparation:  Imaging obtained to evaluate for foreign bodies  Exploration:     Hemostasis achieved with:  Epinephrine    Wound exploration: wound explored through full range of motion and entire depth of wound probed and visualized      Contaminated: yes (Small amounts of gravel removed with forceps and irrigation)    Treatment:     Area cleansed with:  Hibiclens    Amount of cleaning:  Extensive    Irrigation solution:  Sterile saline  Skin repair:     Repair method:  Sutures    Suture size:  5-0    Suture material:  Plain gut    Suture technique:  Simple interrupted    Number of sutures:  5  Approximation:     Approximation:  Loose  Post-procedure details:     Dressing:  Antibiotic ointment    Patient tolerance  of procedure:  Tolerated well, no immediate complications  Comments:      Lacerations have a significant amount of abrasions.  Wounds closed loosely with gut sutures.      Patient placed in thumb spica splint to left hand/wrist.  Performed by ER tech, supervised by myself.    MEDICATIONS GIVEN IN ER    Medications   lidocaine 1% - EPINEPHrine 1:140285 (XYLOCAINE W/EPI) 1 %-1:185114 injection 10 mL (10 mL Injection Given by Other 12/1/21 2200)   Lido-EPINEPHrine-Tetracaine 4-0.05-0.5 % gel 3 mL (3 mL Topical Given 12/1/21 2022)         PROGRESS, DATA ANALYSIS, CONSULTS, AND MEDICAL DECISION MAKING    All labs have been independently reviewed by me.  All radiology studies have been reviewed by me and discussed with radiologist dictating the report.   EKG's independently viewed and interpreted by me.  Discussion below represents my analysis of pertinent findings related to patient's condition, differential diagnosis, treatment plan and final disposition.    I have discussed case with Dr. Andrade, emergency room physician.  He has performed his own bedside examination and agrees with treatment plan.    ED Course as of 12/01/21 2259   Wed Dec 01, 2021   2006 Patient presents with injury sustained in a mechanical fall just prior to arrival.  No neuro deficits or blood thinners.  Patient complains of facial, bilateral hand pain.  Plan for CT scan of head, face, C-spine, x-rays of bilateral hands. [EE]   2009 Left finger x-rays interpreted myself show a base at the first proximal phalanx. [EE]   2225 Patient's wounds have been sutured.  Patient does have a fracture to the base of the first phalanx on the left thumb.  No significant wrist tenderness.  I do not believe there are any wrist fractures.  No neuro deficits.  Patient has appropriate follow-up.  We will discharge. [EE]      ED Course User Index  [EE] Kun Matthews PA       AS OF 22:59 EST VITALS:    BP - 132/90  HR - 88  TEMP - 97.6 °F (36.4 °C)  O2 SATS -  97%        DIAGNOSIS  Final diagnoses:   Fall, initial encounter   Closed fracture of base of proximal phalanx of left thumb   Laceration of forehead, initial encounter   Lip laceration, initial encounter   Closed fracture of nasal bone, initial encounter         DISPOSITION  Discharged           Kun Matthews PA  12/01/21 [id number]

## 2021-12-02 NOTE — ED NOTES
Pt in mask throughout encounter. This ERT was in appropriate PPE including a N95 mask and proper eyewear. Hand hygiene performed before entering room and after leaving room.        Goirgi Shay  12/01/21 2728

## 2021-12-02 NOTE — DISCHARGE INSTRUCTIONS
The forehead sutures will need to be removed in 5 to 7 days.    The sutures into your upper lip will dissolve on their own.    Keep splint on your left hand until cleared by orthopedics.

## 2021-12-02 NOTE — ED NOTES
Pt in mask throughout encounter. This ERT was in appropriate ppe.       Eitan Bush, PCT  12/01/21 2004

## 2021-12-02 NOTE — ED PROVIDER NOTES
MD ATTESTATION NOTE  Patient was placed in face mask in first look and the following protective measures were taken unless documented below in the ED course. Patient was wearing facemask when I entered the room and throughout our encounter. I wore full protective equipment throughout this patient encounter including a N95 face mask, googles, gown and gloves. Hand hygiene was performed before donning protective equipment and after removal when leaving the room.    The JOHN and I have discussed this patient's history, physical exam, and treatment plan. I have reviewed the documentation and personally had a face to face interaction with the patient. I affirm the JOHN documentation and agree with their diagnostics, findings, treatment, plan, and disposition.  The attached note describes my personal findings.    Sylvia Stoevr is a 81 y.o. female who presents to the ED c/o fall with closed head injury.  Patient reports that she was getting into her car when her feet got tangled and she fell forward striking her face on the concrete.  Patient denies any loss of consciousness, does complain of mild pain where she struck her head, otherwise denies any headache, no confusion, no visual disturbances, no nausea vomiting.  Patient denies any facial numbness, no difficulty with eye movements, no trismus.  Patient denies any neck pain, no radicular pain, no weakness or numbness.  Patient did sustain several abrasions on her face as well as her right hand and complains of dull pain in bilateral hands, worse with movement.  Patient is now on a blood thinner.  Patient reports last tetanus was in 2013.  Prior to injury she was at baseline without complaint.    On exam:  General: Multiple abrasions on forehead and on bridge of nose, no crepitus or deformity, no raccoon eyes or he sign.  Head: NCAT.  ENT: No facial anesthesia, visual acuity grossly normal EOMI, PERRLA, MMM.  Neck: Supple, trachea midline.Cervical spine: No step-offs  or deformities, no midline tenderness, full range of motion.  Cardiac: regular rate and rhythm.  Lungs: CTAB.  Abdomen: Soft, NTTP, no rebound tenderness/guarding/rigidity.   : Deferred to JOHN.  Extremities: Moves all extremities well, no peripheral edema.  Left hand: Patient has tenderness and swelling at the base of the left thumb.  Chronic rheumatologic changes.  Right hand: Patient has multiple contusions with tenderness on the dorsum of the right hand.  Neuro: Alert and oriented, no facial droop, speech clear, no dysarthria or aphasia, moves all extremities well, sensation intact light touch all extremities, no focal deficits  Skin: Warm, dry.  2 abrasions on the dorsum of right hand.    Medical Decision Making:  After the initial H&P, I discussed pertinent information from history and physical exam with patient/family.  Discussed differential diagnosis.  Discussed plan for ED evaluation/work-up/treatment.  All questions answered.  Patient/family is agreeable with plan.    ED Course as of 12/02/21 0120   Wed Dec 01, 2021   2006 Patient presents with injury sustained in a mechanical fall just prior to arrival.  No neuro deficits or blood thinners.  Patient complains of facial, bilateral hand pain.  Plan for CT scan of head, face, C-spine, x-rays of bilateral hands. [EE]   2009 Left finger x-rays interpreted myself show a base at the first proximal phalanx. [EE]   2225 Patient's wounds have been sutured.  Patient does have a fracture to the base of the first phalanx on the left thumb.  No significant wrist tenderness.  I do not believe there are any wrist fractures.  No neuro deficits.  Patient has appropriate follow-up.  We will discharge. [EE]      ED Course User Index  [EE] Kun Matthews, PA       Diagnosis  Final diagnoses:   Fall, initial encounter   Closed fracture of base of proximal phalanx of left thumb   Laceration of forehead, initial encounter   Lip laceration, initial encounter   Closed fracture of  nasal bone, initial encounter        Jerry Andrade MD  12/02/21 2081

## 2021-12-06 ENCOUNTER — OFFICE VISIT (OUTPATIENT)
Dept: INTERNAL MEDICINE | Facility: CLINIC | Age: 81
End: 2021-12-06

## 2021-12-06 VITALS
HEIGHT: 61 IN | OXYGEN SATURATION: 99 % | TEMPERATURE: 98.6 F | DIASTOLIC BLOOD PRESSURE: 78 MMHG | SYSTOLIC BLOOD PRESSURE: 120 MMHG | WEIGHT: 135 LBS | BODY MASS INDEX: 25.49 KG/M2 | HEART RATE: 87 BPM

## 2021-12-06 DIAGNOSIS — S01.111A LACERATION OF EYEBROW AND FOREHEAD, RIGHT, INITIAL ENCOUNTER: Primary | ICD-10-CM

## 2021-12-06 DIAGNOSIS — E03.9 ACQUIRED HYPOTHYROIDISM: ICD-10-CM

## 2021-12-06 DIAGNOSIS — S01.511A LIP LACERATION, INITIAL ENCOUNTER: ICD-10-CM

## 2021-12-06 DIAGNOSIS — S02.2XXA CLOSED FRACTURE OF NASAL BONE, INITIAL ENCOUNTER: ICD-10-CM

## 2021-12-06 DIAGNOSIS — S01.81XA LACERATION OF EYEBROW AND FOREHEAD, RIGHT, INITIAL ENCOUNTER: Primary | ICD-10-CM

## 2021-12-06 DIAGNOSIS — S62.512A CLOSED FRACTURE OF BASE OF PROXIMAL PHALANX OF LEFT THUMB: ICD-10-CM
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
## 2021-12-06 PROCEDURE — 99214 OFFICE O/P EST MOD 30 MIN: CPT | Performed by: NURSE PRACTITIONER

## 2021-12-06 NOTE — PROGRESS NOTES
"Chief Complaint  ER follow-up and Hypothyroidism    Subjective          Sylvia Stover presents to [facility] for f/u regarding recent ER visit.   She presented to the ER 12/1 after a fall. She lost her balance while trying to get in her car and fell face first onto the concrete. She did sustain a laceration to her forehead (sutures placed), abrasions to her nose, and a laceration to her upper lip. CT head, facial bone and cervical spine were obtained which showed a linear lucency projecting through the right nasal bone suggestive of a nondisplaced fracture.   Her xray of the left hand showed fracture of the base of the proximal phalanx of the left thumb and an irregularity of the ulnar styloid, concerning for fracture.  She has an appt with ortho today for further evaluation.      Objective   Vital Signs:   /78 (BP Location: Left arm, Patient Position: Sitting, Cuff Size: Adult)   Pulse 87   Temp 98.6 °F (37 °C) (Temporal)   Ht 154.9 cm (61\")   Wt 61.2 kg (135 lb)   SpO2 99%   BMI 25.51 kg/m²     Physical Exam  Constitutional:       Appearance: She is well-developed. She is not ill-appearing.   HENT:      Head: Normocephalic.      Right Ear: Hearing, tympanic membrane and external ear normal.      Left Ear: Hearing, tympanic membrane and external ear normal.      Nose: Nose normal. No nasal deformity, mucosal edema or rhinorrhea.      Right Sinus: No maxillary sinus tenderness or frontal sinus tenderness.      Left Sinus: No maxillary sinus tenderness or frontal sinus tenderness.      Mouth/Throat:      Dentition: Normal dentition.   Eyes:      General: Lids are normal.         Right eye: No discharge.         Left eye: No discharge.      Conjunctiva/sclera: Conjunctivae normal.      Right eye: No exudate.     Left eye: No exudate.  Neck:      Thyroid: No thyroid mass or thyromegaly.      Vascular: No carotid bruit.      Trachea: Trachea normal.   Cardiovascular:      Rate " and Rhythm: Regular rhythm.      Pulses: Normal pulses.      Heart sounds: Normal heart sounds. No murmur heard.      Pulmonary:      Effort: No respiratory distress.      Breath sounds: Normal breath sounds. No decreased breath sounds, wheezing, rhonchi or rales.   Abdominal:      General: Bowel sounds are normal.      Palpations: Abdomen is soft.      Tenderness: There is no abdominal tenderness.   Musculoskeletal:      Cervical back: Normal range of motion. No edema.   Lymphadenopathy:      Head:      Right side of head: No submental, submandibular, tonsillar, preauricular, posterior auricular or occipital adenopathy.      Left side of head: No submental, submandibular, tonsillar, preauricular, posterior auricular or occipital adenopathy.   Skin:     General: Skin is warm and dry.      Nails: There is no clubbing.      Comments: Radha 3 cm laceration right forehead with sutures intact, edges well approximated.  Radha 2 cm laceration on upper lip with edges well approximated.   Neurological:      Mental Status: She is alert.   Psychiatric:         Behavior: Behavior is cooperative.        Result Review :     Common labs    Common Labsle 8/16/21 8/16/21 10/13/21 10/13/21 10/13/21    1435 1437 1437 1437 1437   Glucose    94    BUN    27 (A)    Creatinine    1.36 (A)    eGFR Non  Am    37 (A)    eGFR  Am    45 (A)    Sodium    135 (A)    Potassium    4.6    Chloride    100    Calcium    9.8    Total Protein    7.3    Albumin    4.90    Total Bilirubin    0.4    Alkaline Phosphatase    66    AST (SGOT)    14    ALT (SGPT)    14    WBC  6.00 5.91     Hemoglobin  12.8 12.6     Hematocrit  37.9 38.1     Platelets  240 245     Total Cholesterol     130   Triglycerides     174 (A)   HDL Cholesterol     42   LDL Cholesterol      59   Uric Acid 5.7       (A) Abnormal value       Comments are available for some flowsheets but are not being displayed.           Data reviewed: Recent hospitalization notes ER notes  12/1/21          Assessment and Plan    Diagnoses and all orders for this visit:    1. Laceration of eyebrow and forehead, right, initial encounter (Primary)    2. Lip laceration, initial encounter    3. Closed fracture of base of proximal phalanx of left thumb  -     Acetaminophen (Tylenol) 325 MG capsule; Take 1-2 capsules by mouth 3 (Three) Times a Day As Needed (mild pain) for up to 7 days.  Dispense: 30 capsule; Refill: 0    4. Closed fracture of nasal bone, initial encounter    5. Fall, initial encounter    6. Acquired hypothyroidism  Assessment & Plan:  She is currently managed on Synthroid-check TSH today.    Wounds do not have any drainage, sutures intact with minimal surrounding erythema. She will keep wounds clean and dry and f/u Friday for suture removal. She will f/u with ortho today regarding fracture of left thumb.    She will obtain fasting labs today and we will review on Friday.       Follow Up   Return in about 4 days (around 12/10/2021) for Suture removal-Fri.  Patient was given instructions and counseling regarding her condition or for health maintenance advice. Please see specific information pulled into the AVS if appropriate.

## 2021-12-07 DIAGNOSIS — E03.9 ACQUIRED HYPOTHYROIDISM: Primary | ICD-10-CM

## 2021-12-07 RX ORDER — LEVOTHYROXINE SODIUM 88 UG/1
88 TABLET ORAL DAILY
Qty: 90 TABLET | Refills: 1 | Status: SHIPPED | OUTPATIENT
Start: 2021-12-07 | End: 2022-01-19 | Stop reason: SDUPTHER

## 2021-12-10 ENCOUNTER — OFFICE VISIT (OUTPATIENT)
Dept: INTERNAL MEDICINE | Facility: CLINIC | Age: 81
End: 2021-12-10

## 2021-12-10 VITALS
RESPIRATION RATE: 16 BRPM | OXYGEN SATURATION: 98 % | DIASTOLIC BLOOD PRESSURE: 64 MMHG | TEMPERATURE: 96.4 F | BODY MASS INDEX: 25.75 KG/M2 | HEIGHT: 61 IN | SYSTOLIC BLOOD PRESSURE: 110 MMHG | HEART RATE: 75 BPM | WEIGHT: 136.4 LBS

## 2021-12-10 DIAGNOSIS — S01.81XA LACERATION OF FOREHEAD, INITIAL ENCOUNTER: ICD-10-CM

## 2021-12-10 DIAGNOSIS — S02.2XXA CLOSED FRACTURE OF NASAL BONE, INITIAL ENCOUNTER: ICD-10-CM
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
## 2021-12-10 DIAGNOSIS — S01.511A LIP LACERATION, INITIAL ENCOUNTER: ICD-10-CM

## 2021-12-10 DIAGNOSIS — S62.512A CLOSED DISPLACED FRACTURE OF PROXIMAL PHALANX OF LEFT THUMB, INITIAL ENCOUNTER: Primary | ICD-10-CM

## 2021-12-10 PROCEDURE — 99213 OFFICE O/P EST LOW 20 MIN: CPT | Performed by: NURSE PRACTITIONER

## 2021-12-10 NOTE — PROGRESS NOTES
"Chief Complaint  Suture / Staple Removal    Subjective          Sylvia Stover presents to [facility] for f/u regarding laceration and suture removal.    She presented to the ER December 1 after losing her balance while trying to get in her car.  She did fall forward and struck the concrete.  She denies loss of consciousness.  She did go to the ER where she received sutures for laceration on her upper lip and right forehead.  She has done well since the fall without headache.  No nausea or vomiting.  No dizziness or lightheadedness.  She did receive a tetanus shot in the ER.        Objective   Vital Signs:   /64 (BP Location: Left arm, Patient Position: Sitting, Cuff Size: Adult)   Pulse 75   Temp 96.4 °F (35.8 °C) (Temporal)   Resp 16   Ht 154.9 cm (61\")   Wt 61.9 kg (136 lb 6.4 oz)   SpO2 98%   BMI 25.77 kg/m²     Physical Exam  Constitutional:       Appearance: She is well-developed. She is not ill-appearing.   HENT:      Head: Normocephalic.      Right Ear: Hearing, tympanic membrane and external ear normal.      Left Ear: Hearing, tympanic membrane and external ear normal.      Nose: Nose normal. No nasal deformity, mucosal edema or rhinorrhea.      Right Sinus: No maxillary sinus tenderness or frontal sinus tenderness.      Left Sinus: No maxillary sinus tenderness or frontal sinus tenderness.      Mouth/Throat:      Dentition: Normal dentition.   Eyes:      General: Lids are normal.         Right eye: No discharge.         Left eye: No discharge.      Conjunctiva/sclera: Conjunctivae normal.      Right eye: No exudate.     Left eye: No exudate.  Neck:      Thyroid: No thyroid mass or thyromegaly.      Vascular: No carotid bruit.      Trachea: Trachea normal.   Cardiovascular:      Rate and Rhythm: Regular rhythm.      Pulses: Normal pulses.      Heart sounds: Normal heart sounds. No murmur heard.      Pulmonary:      Effort: No respiratory distress.      Breath " sounds: Normal breath sounds. No decreased breath sounds, wheezing, rhonchi or rales.   Musculoskeletal:      Cervical back: Normal range of motion. No edema.      Comments: She has a splint on her left first finger and thumb for phalanx fracture   Lymphadenopathy:      Head:      Right side of head: No submental, submandibular, tonsillar, preauricular, posterior auricular or occipital adenopathy.      Left side of head: No submental, submandibular, tonsillar, preauricular, posterior auricular or occipital adenopathy.   Skin:     General: Skin is warm and dry.      Findings: Abrasion, ecchymosis (under eyes extending distally to chin) and laceration (forehead and upper lip) present.      Nails: There is no clubbing.      Comments: There is an approximate 3 cm laceration on the right forehead and 2.5 cm laceration on the upper lip.  Edges are well approximated with significant dried blood but no active bleeding or drainage from wounds.   Neurological:      Mental Status: She is alert.   Psychiatric:         Behavior: Behavior is cooperative.        Result Review :   The following data was reviewed by: JUNE Mohr on 12/10/2021:  Common labs    Common Labsle 8/16/21 8/16/21 10/13/21 10/13/21 10/13/21    1435 1437 1437 1437 1437   Glucose    94    BUN    27 (A)    Creatinine    1.36 (A)    eGFR Non  Am    37 (A)    eGFR  Am    45 (A)    Sodium    135 (A)    Potassium    4.6    Chloride    100    Calcium    9.8    Total Protein    7.3    Albumin    4.90    Total Bilirubin    0.4    Alkaline Phosphatase    66    AST (SGOT)    14    ALT (SGPT)    14    WBC  6.00 5.91     Hemoglobin  12.8 12.6     Hematocrit  37.9 38.1     Platelets  240 245     Total Cholesterol     130   Triglycerides     174 (A)   HDL Cholesterol     42   LDL Cholesterol      59   Uric Acid 5.7       (A) Abnormal value       Comments are available for some flowsheets but are not being displayed.           Data reviewed:  Radiologic studies CT head, facial bones and Recent hospitalization notes 12/1/21   Suture Removal    Date/Time: 12/11/2021 9:02 AM  Performed by: Rhoda Luna APRN  Authorized by: Rhoda Luna APRN   Consent: Verbal consent obtained.  Risks and benefits: risks, benefits and alternatives were discussed  Consent given by: patient  Body area: head/neck  Location details: forehead  Wound Appearance: pink  Sutures Removed: 4  Post-removal: Steri-Strips applied  Sutures placed in this facility: [facility] ER.  Patient tolerance: patient tolerated the procedure well with no immediate complications  Comments: 2 sutures removed from upper lip, steri strips applied            Assessment and Plan    Diagnoses and all orders for this visit:    1. Closed displaced fracture of proximal phalanx of left thumb, initial encounter (Primary)  Comments:  She saw Ortho earlier this week and now has a splint on her right finger and thumb which she is tolerating well.  Orders:  -     Acetaminophen (Tylenol) 325 MG capsule; Take 1-2 capsules by mouth 3 (Three) Times a Day As Needed (mild pain) for up to 7 days.  Dispense: 30 capsule; Refill: 0    2. Closed fracture of nasal bone, initial encounter  Comments:  Noted on CT scan of the facial bones, reports tenderness in area but is otherwise doing well.    3. Laceration of forehead, initial encounter  Comments:  Sutures removed which she tolerated well  Orders:  -     Suture Removal    4. Lip laceration, initial encounter  Comments:  Sutures removed with mild discomfort due to tenderness in area  Orders:  -     Suture Removal    5. Fall, initial encounter    Her wounds are well-healed without signs of infection.  She has seen Ortho regarding the fracture of her first phalanx.  She may take Tylenol as needed for pain control.      Follow Up   Return in about 6 months (around 6/10/2022), or if symptoms worsen or fail to improve, for Next scheduled follow up.  Patient was given instructions  and counseling regarding her condition or for health maintenance advice. Please see specific information pulled into the AVS if appropriate.

## 2022-01-01 DIAGNOSIS — I10 ESSENTIAL HYPERTENSION: ICD-10-CM

## 2022-01-03 RX ORDER — AMLODIPINE BESYLATE 10 MG/1
10 TABLET ORAL DAILY
Qty: 90 TABLET | Refills: 3 | Status: SHIPPED | OUTPATIENT
Start: 2022-01-03 | End: 2022-01-25 | Stop reason: SDUPTHER

## 2022-01-19 ENCOUNTER — TELEPHONE (OUTPATIENT)
Dept: INTERNAL MEDICINE | Facility: CLINIC | Age: 82
End: 2022-01-19

## 2022-01-19 DIAGNOSIS — E03.9 ACQUIRED HYPOTHYROIDISM: ICD-10-CM

## 2022-01-19 DIAGNOSIS — J98.01 BRONCHOSPASM: ICD-10-CM

## 2022-01-19 DIAGNOSIS — E78.5 HYPERLIPIDEMIA, UNSPECIFIED HYPERLIPIDEMIA TYPE: ICD-10-CM

## 2022-01-19 DIAGNOSIS — H81.10 BENIGN PAROXYSMAL POSITIONAL VERTIGO, UNSPECIFIED LATERALITY: ICD-10-CM

## 2022-01-19 RX ORDER — SIMVASTATIN 20 MG
20 TABLET ORAL
Qty: 90 TABLET | Refills: 1 | Status: SHIPPED | OUTPATIENT
Start: 2022-01-19 | End: 2022-01-25 | Stop reason: SDUPTHER

## 2022-01-19 RX ORDER — LEVOTHYROXINE SODIUM 88 UG/1
88 TABLET ORAL DAILY
Qty: 90 TABLET | Refills: 1 | Status: SHIPPED | OUTPATIENT
Start: 2022-01-19 | End: 2022-01-25 | Stop reason: SDUPTHER

## 2022-01-19 RX ORDER — LISINOPRIL 20 MG/1
20 TABLET ORAL 2 TIMES DAILY
Qty: 180 TABLET | Refills: 1 | Status: SHIPPED | OUTPATIENT
Start: 2022-01-19 | End: 2022-01-25 | Stop reason: SDUPTHER

## 2022-01-19 NOTE — TELEPHONE ENCOUNTER
----- Message from Sylvia Stover sent at 1/19/2022 11:52 AM EST -----  Regarding: Drugs by mail  Just had another conversation with [facility].  They now tell me that I shd ask you to contact them to give them prescriptions for my regular daily drugs so I can start receiving drugs by mail.  Someone in your office should either phone them at  or fax them at .  (I had previously been told that they would contact you but apparently that was not right.)  Thanks for your help.  Sylvia Stover

## 2022-01-24 ENCOUNTER — TELEPHONE (OUTPATIENT)
Dept: INTERNAL MEDICINE | Facility: CLINIC | Age: 82
End: 2022-01-24

## 2022-01-24 DIAGNOSIS — H81.10 BENIGN PAROXYSMAL POSITIONAL VERTIGO, UNSPECIFIED LATERALITY: ICD-10-CM

## 2022-01-24 RX ORDER — MECLIZINE HCL 12.5 MG/1
12.5 TABLET ORAL 3 TIMES DAILY PRN
Qty: 30 TABLET | Refills: 2 | Status: SHIPPED | OUTPATIENT
Start: 2022-01-24 | End: 2022-01-24 | Stop reason: SDUPTHER

## 2022-01-24 RX ORDER — MECLIZINE HCL 12.5 MG/1
TABLET ORAL
Qty: 30 TABLET | Refills: 2 | Status: SHIPPED | OUTPATIENT
Start: 2022-01-24

## 2022-01-24 NOTE — TELEPHONE ENCOUNTER
----- Message from JUNE López sent at 1/24/2022  9:38 AM EST -----  Regarding: FW: meclizine  Okay to refill.  ----- Message -----  From: Coral Zaidi MA  Sent: 1/24/2022   9:26 AM EST  To: JUNE López  Subject: FW: meclizine                                    Is this okay to refill? Has not been refilled since 2018     ----- Message -----  From: Sylvia Stover  Sent: 1/24/2022   9:25 AM EST  To: [facility]  Subject: meclizine                                        Can you please call in a prescription for me for 12.5 meclizine tabs to the [facility] pharmacy at .  You have allowed me to keep some on hand for the occasional dizzy spell and what I have on hand is out of date.  Thank you.

## 2022-01-24 NOTE — TELEPHONE ENCOUNTER
How Severe Is It?: mild Meclizine 12.5mg 1-2 tablets every 8 hours as needed, thanks.   Is This A New Presentation, Or A Follow-Up?: Follow Up Isotretinoin

## 2022-01-24 NOTE — TELEPHONE ENCOUNTER
Caller: RENAE [address] [address] [place] - [phone]  - [phone] FX    Relationship: Pharmacy    Who are you requesting to speak with (clinical staff, provider,  specific staff member): ELVIA HOLBROOK    What was the call regarding: [facility] WANT[facility] TO KNOW THE CORRECT DIRECTIONS FOR THE meclizine (ANTIVERT) 12.5 MG tablet PRESCRIPTION.     ONE SET OF DIRECTIONS IS FOR ONE TABLET THREE TIMES A DAY AND THE OTHER ON IS FOR ONE-TWO TABLETS EVERY 8 HOURS.     PLEASE SEND OVER A NEW PRESCRIPTION WITH THE CORRECT DIRECTIONS.

## 2022-01-25 DIAGNOSIS — I10 ESSENTIAL HYPERTENSION: ICD-10-CM

## 2022-01-25 DIAGNOSIS — E03.9 ACQUIRED HYPOTHYROIDISM: ICD-10-CM

## 2022-01-25 DIAGNOSIS — E78.5 HYPERLIPIDEMIA, UNSPECIFIED HYPERLIPIDEMIA TYPE: ICD-10-CM

## 2022-01-25 RX ORDER — LISINOPRIL 20 MG/1
20 TABLET ORAL 2 TIMES DAILY
Qty: 180 TABLET | Refills: 1 | Status: SHIPPED | OUTPATIENT
Start: 2022-01-25 | End: 2022-06-15

## 2022-01-25 RX ORDER — LEVOTHYROXINE SODIUM 88 UG/1
88 TABLET ORAL DAILY
Qty: 90 TABLET | Refills: 1 | Status: SHIPPED | OUTPATIENT
Start: 2022-01-25 | End: 2022-06-20

## 2022-01-25 RX ORDER — AMLODIPINE BESYLATE 10 MG/1
10 TABLET ORAL DAILY
Qty: 90 TABLET | Refills: 3 | Status: SHIPPED | OUTPATIENT
Start: 2022-01-25 | End: 2022-11-07

## 2022-01-25 RX ORDER — SIMVASTATIN 20 MG
20 TABLET ORAL
Qty: 90 TABLET | Refills: 1 | Status: SHIPPED | OUTPATIENT
Start: 2022-01-25 | End: 2022-09-16

## 2022-01-25 NOTE — TELEPHONE ENCOUNTER
Caller: [facility] - [place], [place] - [address] AT [address] TO [facility] - [phone] PH - [phone] FX    Relationship: Pharmacy    Best call back number: [phone]    Requested Prescriptions:   Requested Prescriptions     Pending Prescriptions Disp Refills   • amLODIPine (NORVASC) 10 MG tablet 90 tablet 3     Sig: Take 1 tablet by mouth Daily.   • levothyroxine (Synthroid) 88 MCG tablet 90 tablet 1     Sig: Take 1 tablet by mouth Daily.   • lisinopril (PRINIVIL,ZESTRIL) 20 MG tablet 180 tablet 1     Sig: Take 1 tablet by mouth 2 (Two) Times a Day.   • simvastatin (ZOCOR) 20 MG tablet 90 tablet 1     Sig: Take 1 tablet by mouth every night at bedtime.        Pharmacy where request should be sent: [facility] - [place], [place] - [address] AT [address] TO [facility] - [phone]  - [phone] FX     Additional details provided by patient: PHARMACY CANCELLED THE PRESCRIPTIONS THAT WERE SENT ON 1/19/22    Does the patient have less than a 3 day supply:  [x] Yes  [] No    Rajinder Castaneda Rep   01/25/22 11:17 EST

## 2022-04-02 ENCOUNTER — APPOINTMENT (OUTPATIENT)
Dept: CT IMAGING | Facility: HOSPITAL | Age: 82
End: 2022-04-02

## 2022-04-02 ENCOUNTER — HOSPITAL ENCOUNTER (EMERGENCY)
Facility: HOSPITAL | Age: 82
Discharge: HOME | End: 2022-04-02
Attending: EMERGENCY MEDICINE | Admitting: EMERGENCY MEDICINE

## 2022-04-02 VITALS
WEIGHT: 134 LBS | TEMPERATURE: 98.3 F | HEART RATE: 73 BPM | HEIGHT: 61 IN | OXYGEN SATURATION: 98 % | BODY MASS INDEX: 25.3 KG/M2 | RESPIRATION RATE: 16 BRPM | DIASTOLIC BLOOD PRESSURE: 76 MMHG | SYSTOLIC BLOOD PRESSURE: 126 MMHG

## 2022-04-02 DIAGNOSIS — E78.5 HYPERLIPIDEMIA, UNSPECIFIED HYPERLIPIDEMIA TYPE: ICD-10-CM

## 2022-04-02 DIAGNOSIS — I10 PRIMARY HYPERTENSION: ICD-10-CM

## 2022-04-02 DIAGNOSIS — T14.8XXA HEMATOMA: ICD-10-CM
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
## 2022-04-02 DIAGNOSIS — Z87.39 HISTORY OF OSTEOPOROSIS: ICD-10-CM

## 2022-04-02 DIAGNOSIS — E03.9 HYPOTHYROIDISM, UNSPECIFIED TYPE: ICD-10-CM

## 2022-04-02 DIAGNOSIS — K21.9 GASTROESOPHAGEAL REFLUX DISEASE WITHOUT ESOPHAGITIS: ICD-10-CM
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
## 2022-04-02 DIAGNOSIS — S70.01XA CONTUSION OF RIGHT HIP, INITIAL ENCOUNTER: Primary | ICD-10-CM

## 2022-04-02 PROCEDURE — 70450 CT HEAD/BRAIN W/O DYE: CPT

## 2022-04-02 PROCEDURE — 72192 CT PELVIS W/O DYE: CPT

## 2022-04-02 PROCEDURE — 99283 EMERGENCY DEPT VISIT LOW MDM: CPT

## 2022-04-02 NOTE — ED TRIAGE NOTES
PT arrived via private vehicle, states she had a fall at home, pt denies dizziness or light headed states her dog pulled her down. Pt states she hit the back of her head denies LOC and blood thinners. Denies headache, c/o of pain to right hip when she stands/applies pressure to the leg. A&Ox4.     Pt wearing mask, triage personnel wearing appropriate PPE.

## 2022-04-03 NOTE — ED PROVIDER NOTES
EMERGENCY DEPARTMENT ENCOUNTER    Room Number:  08/08  Date of encounter:  4/3/2022  PCP: Rhoda Luna APRN  Historian: Patient      HPI:  Chief Complaint: Fall   A complete HPI/ROS/PMH/PSH/SH/FH are unobtainable due to: N/A    Context: Sylvia Stover is a 81 y.o. female with past medical history of HTN, HLD, CKD, GERD, osteoporosis, and hypothyroidism who presents to the ED c/o fall.  Patient states that she was going down a couple steps with her dog leash, when it pulled her and she fell back onto the landing.  Patient states that she hit her head on a hollow door, denies any loss of consciousness, dizziness, headache, nausea or vomiting, and landed on her right buttock/hip.  Patient denies any neck pain, back pain, or any other injuries.  States that she has severe pain when attempting to stand or bear weight.  Denies any recent illness, fever, chills, chest pain, shortness of breath, or UTI symptoms.      PAST MEDICAL HISTORY  Active Ambulatory Problems     Diagnosis Date Noted   • Acquired hypothyroidism    • Essential hypertension    • Hyperlipidemia    • Stage 3 chronic kidney disease (HCC)    • Osteoarthritis of right knee 10/29/2014   • Osteoporosis, senile 06/12/2018   • APC (atrial premature contractions) 01/13/2021   • Carpal tunnel syndrome 05/12/2020   • Mobitz type 2 second degree AV block 02/23/2021   • PSVT (paroxysmal supraventricular tachycardia) (Former[facility]) 02/23/2021   • Bronchospasm 04/19/2021   • Great toe pain, left 08/16/2021   • Gastroesophageal reflux disease without esophagitis 10/31/2021     Resolved Ambulatory Problems     Diagnosis Date Noted   • Fall 08/16/2020   • Subdural hematoma (HCC) 08/18/2020   • Traumatic subarachnoid bleed with LOC of 30 minutes or less (Former[facility]) 11/16/2020   • Syncope and collapse 11/16/2020     Past Medical History:   Diagnosis Date   • Age related osteoporosis    • Allergic 1996   • Anemia    • Cataract 2012   • Chronic gastritis    • Chronic renal  insufficiency    • Gastric ulcer    • GERD (gastroesophageal reflux disease)    • Greater trochanteric bursitis    • H/O bone density study 11/2012   • H/O mammogram 10/28/2015   • HL (hearing loss)    • Hypertension    • Hypothyroidism    • Lumbago    • Osteoarthrosis    • Osteopenia    • PONV (postoperative nausea and vomiting)    • Renal insufficiency    • SAH (subarachnoid hemorrhage) (HCC)    • Sinus bradycardia          PAST SURGICAL HISTORY  Past Surgical History:   Procedure Laterality Date   • CARPAL TUNNEL RELEASE Right 03/11/2021   • COLONOSCOPY  07/06/2006   • COLONOSCOPY N/A 5/12/2016    Procedure: COLONOSCOPY into cecum/terminal ileum;  Surgeon: Huy Sam MD;  [facility] [facility];  Service:    • ENDOSCOPY N/A 5/12/2016    Procedure: ESOPHAGOGASTRODUODENOSCOPY with biopsy;  Surgeon: Huy Sam MD;  Location: [facility] END[facility];  Service:    • ENDOSCOPY N/A 8/12/2016    Procedure: ESOPHAGOGASTRODUODENOSCOPY WITH BIOPSIES (COLD);  Surgeon: Huy Sam MD;  [facility] [facility];  Service:    • EYE SURGERY  2015    Cataract correction   • FOOT SURGERY     • JOINT REPLACEMENT  2014    knee replacements   • KNEE SURGERY     • PAP SMEAR  09/17/2013   • TONSILLECTOMY     • TOTAL THYROIDECTOMY     • UPPER GASTROINTESTINAL ENDOSCOPY  04/21/2014    Janene Sánchez Grade IV reflux esophagitis, gastric ulcer w/clean base, chronic gastritis, no h-pylori         FAMILY HISTORY  Family History   Problem Relation Age of Onset   • Colon cancer Mother    • Arthritis Mother    • Cancer Mother    • Hearing loss Mother    • Hyperlipidemia Mother    • Vision loss Mother         Cataracts   • Kidney cancer Brother    • Alcohol abuse Brother    • Cancer Brother    • Early death Brother         Cancer   • Cancer Paternal Grandfather    • Heart disease Father    • Hyperlipidemia Father    • Thyroid disease Daughter          SOCIAL HISTORY  Social History     Socioeconomic History   • Marital status:     Tobacco Use   • Smoking status: Former Smoker     Packs/day: 0.25     Years: 0.00     Pack years: 0.00     Types: Cigarettes     Start date: 1/1/1961     Quit date: 2007     Years since quitting: 15.2   • Smokeless tobacco: Never Used   • Tobacco comment: My tobacco use varied from year to year.  When I quit I was smoking three cigarettes a week.   Substance and Sexual Activity   • Alcohol use: No     Alcohol/week: 0.0 standard drinks     Comment: caffeine1 cup daily   • Drug use: No   • Sexual activity: Never         ALLERGIES  Codeine and Metoprolol        REVIEW OF SYSTEMS  Review of Systems     All systems reviewed and negative except for those discussed in HPI.       PHYSICAL EXAM    I have reviewed the triage vital signs and nursing notes.    ED Triage Vitals [04/02/22 1926]   Temp Heart Rate Resp BP SpO2   98.3 °F (36.8 °C) 83 16 146/95 98 %      Temp src Heart Rate Source Patient Position BP Location FiO2 (%)   -- -- -- -- --       Physical Exam  GENERAL: Alert and oriented x3, not distressed  HENT: no hemotympanum, no rhinorrhea, no dental injury or malocclusion, head atraumatic/normocephalic  EYES: no scleral icterus, PERRL, EOMI  CV: regular rhythm, regular rate, intact pedal pulses  RESPIRATORY: normal effort, no chest wall tenderness, crepitus, or subcutaneous emphysema, lung sounds clear to auscultate bilaterally  ABDOMEN: soft/nontender, no rebound or guarding  MUSCULOSKELETAL: no deformity, pelvis stable, no hip injury, no C, T, or L-spine tenderness, there is mild tenderness over the right buttock/pelvis  NEURO: alert, moves all extremities, follows commands  SKIN: warm, dry, intact        LAB RESULTS  No results found for this or any previous visit (from the past 24 hour(s)).    Ordered the above labs and independently reviewed the results.        RADIOLOGY  CT Head Without Contrast    Result Date: 4/2/2022  CT HEAD WITHOUT CONTRAST  HISTORY: Fall with head injury  COMPARISON: None  available.  TECHNIQUE: Axial CT imaging was obtained through the brain. No IV contrast was administered.  FINDINGS: No acute intracranial hemorrhage is seen. There is diffuse atrophy. There is periventricular and deep white matter microangiopathic change. There are bilateral basal ganglia calcifications. No calvarial fracture is seen. The paranasal sinuses and mastoid air cells appear clear.      No acute intracranial findings.  Radiation dose reduction techniques were utilized, including automated exposure control and exposure modulation based on body size.  This report was finalized on 4/2/2022 10:02 PM by Dr. Rochelle Sandoval M.D.      CT Pelvis Without Contrast    Result Date: 4/2/2022  CT OF THE BONY PELVIS  HISTORY: Fall with right pelvic pain  COMPARISON: 11/17/2020  TECHNIQUE: Axial CT imaging was obtained through the pelvis. Coronal and sagittal reformatted images were obtained.  FINDINGS: No acute fracture or subluxation of the bony pelvis is seen. There is stable anterolisthesis of L5 on S1. There is some soft tissue stranding which is seen adjacent to the right iliac wing, as well as some asymmetric enlargement of the lateral right gluteal musculature, likely representing some intramuscular hemorrhage, although an organized hematoma is not seen. Again, no underlying fracture is seen. No pelvic hematoma is identified. There is relative atrophy of the left iliacus muscle when compared to the right. However, the appearance of the right iliacus muscle appears stable when compared to prior study. No acute abnormalities are identified within the visualized abdomen and pelvis.       1. No acute fracture or subluxation identified. 2. Soft tissue stranding seen overlying the right iliac wing, suggesting contusion, as well as some asymmetric enlargement of the right gluteal musculature with loss of fat planes, suggesting some intramuscular hemorrhage, without organized hematoma. If symptoms persist, MRI could be  considered for further assessment.  Radiation dose reduction techniques were utilized, including automated exposure control and exposure modulation based on body size.  This report was finalized on 4/2/2022 10:16 PM by Dr. Rochelle Sandoval M.D.        I ordered the above noted radiological studies. Reviewed by me and discussed with radiologist.  See dictation for official radiology interpretation.      PROCEDURES    Procedures      MEDICATIONS GIVEN IN ER    Medications - No data to display      PROGRESS, DATA ANALYSIS, CONSULTS, AND MEDICAL DECISION MAKING    All labs have been independently reviewed by me.  All radiology studies have been reviewed by me and discussed with radiologist dictating the report.   EKG's independently viewed and interpreted by me.  Discussion below represents my analysis of pertinent findings related to patient's condition, differential diagnosis, treatment plan and final disposition.    DDx: Includes but not limited to hip fracture, pelvis fracture, hip contusion, minor head injury, concussion, intracranial hemorrhage, skull fracture    ED Course as of 04/03/22 0310   Sat Apr 02, 2022   2308 Patient rechecked, resting comfortably in bed.  Discussed results of her x-ray that showed no evidence for fracture but soft tissue injury to the right buttock.  Offered patient pain medications, she states that she would be fine with Tylenol.  Patient also states that she has a walker that she will use. [FAB]      ED Course User Index  [FAB] Fransisco Luna PA       MDM: CT scans of the head showed no acute intracranial abnormality, and CT scan of the pelvis shows no evidence for pelvic or hip fracture.  Patient does appear to have soft tissue contusion over the right pelvis/buttock.  Patient has the use of a walker at home, states will take Tylenol for pain, has been given strict return to ER precautions.  Vital signs are stable, patient is safe for discharge home.    PPE: The patient wore a  surgical mask throughout the entire patient encounter. I wore an N95.    AS OF 03:10 EDT VITALS:    BP - 126/76  HR - 73  TEMP - 98.3 °F (36.8 °C)  O2 SATS - 98%        DIAGNOSIS  Final diagnoses:   Contusion of right hip, initial encounter   Hematoma   Fall in home, initial encounter   Primary hypertension   Hyperlipidemia, unspecified hyperlipidemia type   Gastroesophageal reflux disease without esophagitis   Hypothyroidism, unspecified type   History of osteoporosis         DISPOSITION  DISCHARGE    Patient discharged in stable condition.    Reviewed implications of results, diagnosis, meds, responsibility to follow up, warning signs and symptoms of possible worsening, potential complications and reasons to return to ER.    Patient/Family voiced understanding of above instructions.    Discussed plan for discharge, as there is no emergent indication for admission. Patient referred to primary care provider for BP management due to today's BP. Pt/family is agreeable and understands need for follow up and repeat testing.  Pt is aware that discharge does not mean that nothing is wrong but it indicates no emergency is present that requires admission and they must continue care with follow-up as given below or physician of their choice.     FOLLOW-UP  Rhoda Luna, APRN  [address] [address]  [phone]    Schedule an appointment as soon as possible for a visit in 3 days           Medication List      No changes were made to your prescriptions during this visit.                    Fransisco Luna PA  04/03/22 0311

## 2022-04-03 NOTE — DISCHARGE INSTRUCTIONS
Home, rest, home medicine as prescribed, Tylenol as needed for pain, apply ice to affected area, use your walker.  Follow up with PCP for recheck. Return to care if symptoms worsen or with further concerns.

## 2022-04-03 NOTE — ED PROVIDER NOTES
Pt presents to the ED c/o  right posterior hip pain after she slipped and fell while going down a couple of steps.  She fell backwards onto the landing.  Denies head injury or LOC.  Pain with weightbearing prior to arrival.  Currently at rest denies any significant pain.     On exam,   General: No acute distress, nontoxic  HEENT: EOMI  Pulm: Symmetric chest rise, nonlabored breathing  CV: Regular rate and rhythm  GI: Nondistended  MSK: No deformity, full range of motion of the right hip without pain, does have some tenderness to palpation over the posterior hip at the superior gluteal region  Skin: Warm, dry  Neuro: Awake, alert, oriented x 4, moving all extremities, no focal deficits  Psych: Calm, cooperative    Vital signs and nursing notes reviewed.         N95, protective eye goggles, and gloves used during this encounter. Patient in surgical mask.      Plan: No fractures on CT scans, plan for ambulatory trial and likely discharge home with supportive care measures including Tylenol, ice and heat.  PCP follow-up as needed, ED return for worsening symptoms as needed.  Gradual return to normal activity as tolerated.       Attestation:  The JOHN and I have discussed this patient's history, physical exam, and treatment plan.  I have reviewed the documentation and personally had a face to face interaction with the patient. I affirm the documentation and agree with the treatment and plan.  The attached note describes my personal findings.            Ji Robles MD  04/02/22 [id number]

## 2022-06-15 ENCOUNTER — OFFICE VISIT (OUTPATIENT)
Dept: INTERNAL MEDICINE | Facility: CLINIC | Age: 82
End: 2022-06-15

## 2022-06-15 VITALS
SYSTOLIC BLOOD PRESSURE: 122 MMHG | HEIGHT: 61 IN | TEMPERATURE: 98 F | BODY MASS INDEX: 25 KG/M2 | HEART RATE: 60 BPM | DIASTOLIC BLOOD PRESSURE: 84 MMHG | OXYGEN SATURATION: 100 % | WEIGHT: 132.4 LBS

## 2022-06-15 DIAGNOSIS — N18.32 STAGE 3B CHRONIC KIDNEY DISEASE: Chronic | ICD-10-CM

## 2022-06-15 DIAGNOSIS — I10 ESSENTIAL HYPERTENSION: Primary | Chronic | ICD-10-CM

## 2022-06-15 DIAGNOSIS — E03.9 ACQUIRED HYPOTHYROIDISM: Chronic | ICD-10-CM

## 2022-06-15 DIAGNOSIS — E78.00 PURE HYPERCHOLESTEROLEMIA: Chronic | ICD-10-CM

## 2022-06-15 DIAGNOSIS — M81.0 OSTEOPOROSIS, SENILE: Chronic | ICD-10-CM

## 2022-06-15 DIAGNOSIS — K21.9 GASTROESOPHAGEAL REFLUX DISEASE WITHOUT ESOPHAGITIS: Chronic | ICD-10-CM

## 2022-06-15 PROCEDURE — 99214 OFFICE O/P EST MOD 30 MIN: CPT | Performed by: NURSE PRACTITIONER

## 2022-06-15 RX ORDER — LISINOPRIL 20 MG/1
20 TABLET ORAL DAILY
Qty: 90 TABLET | Refills: 1
Start: 2022-06-15 | End: 2022-12-27 | Stop reason: SDUPTHER

## 2022-06-15 RX ORDER — ACETAMINOPHEN 500 MG
1 TABLET ORAL EVERY 6 HOURS PRN
COMMUNITY

## 2022-06-15 NOTE — PROGRESS NOTES
"Chief Complaint  Hypertension (6 month follow up), Hypothyroidism, and Hyperlipidemia    Subjective        Sylvia Stover presents to [facility] for f/u regarding hypothyroidism, HTN and hypercholesterolemia.    She c/ left-sided neck stiffness which is worse after walking several miles, improves with applying heat to area. She denies paresthesias of upper extremities, no weakness. Denies dyspnea. She is walking anuradha 20 minutes which she is tolerating well.      Objective   Vital Signs:  /84 (BP Location: Left arm, Patient Position: Sitting, Cuff Size: Adult)   Pulse 60   Temp 98 °F (36.7 °C) (Temporal)   Ht 154.9 cm (61\")   Wt 60.1 kg (132 lb 6.4 oz)   SpO2 100%   BMI 25.02 kg/m²   Estimated body mass index is 25.02 kg/m² as calculated from the following:    Height as of this encounter: 154.9 cm (61\").    Weight as of this encounter: 60.1 kg (132 lb 6.4 oz).          Physical Exam  Constitutional:       Appearance: She is well-developed. She is not ill-appearing.   HENT:      Head: Normocephalic.      Right Ear: Hearing, tympanic membrane and external ear normal.      Left Ear: Hearing, tympanic membrane and external ear normal.      Nose: Nose normal. No nasal deformity, mucosal edema or rhinorrhea.      Right Sinus: No maxillary sinus tenderness or frontal sinus tenderness.      Left Sinus: No maxillary sinus tenderness or frontal sinus tenderness.      Mouth/Throat:      Dentition: Normal dentition.   Eyes:      General: Lids are normal.         Right eye: No discharge.         Left eye: No discharge.      Conjunctiva/sclera: Conjunctivae normal.      Right eye: No exudate.     Left eye: No exudate.  Neck:      Thyroid: No thyroid mass or thyromegaly.      Vascular: No carotid bruit.      Trachea: Trachea normal.   Cardiovascular:      Rate and Rhythm: Regular rhythm.      Pulses: Normal pulses.      Heart sounds: Normal heart sounds. No murmur heard.  Pulmonary:      " Effort: No respiratory distress.      Breath sounds: Normal breath sounds. No decreased breath sounds, wheezing, rhonchi or rales.   Abdominal:      General: Bowel sounds are normal.      Palpations: Abdomen is soft.      Tenderness: There is no abdominal tenderness.   Musculoskeletal:      Cervical back: Normal range of motion. No edema. Muscular tenderness (left-sided muscle tightness) present.   Lymphadenopathy:      Head:      Right side of head: No submental, submandibular, tonsillar, preauricular, posterior auricular or occipital adenopathy.      Left side of head: No submental, submandibular, tonsillar, preauricular, posterior auricular or occipital adenopathy.   Skin:     General: Skin is warm and dry.      Nails: There is no clubbing.   Neurological:      Mental Status: She is alert.   Psychiatric:         Behavior: Behavior is cooperative.        Result Review :  The following data was reviewed by: JUNE Mohr on 06/15/2022:  Common labs    Common Labsle 8/16/21 8/16/21 10/13/21 10/13/21 10/13/21    1435 1437 1437 1437 1437   Glucose    94    BUN    27 (A)    Creatinine    1.36 (A)    eGFR Non  Am    37 (A)    eGFR  Am    45 (A)    Sodium    135 (A)    Potassium    4.6    Chloride    100    Calcium    9.8    Total Protein    7.3    Albumin    4.90    Total Bilirubin    0.4    Alkaline Phosphatase    66    AST (SGOT)    14    ALT (SGPT)    14    WBC  6.00 5.91     Hemoglobin  12.8 12.6     Hematocrit  37.9 38.1     Platelets  240 245     Total Cholesterol     130   Triglycerides     174 (A)   HDL Cholesterol     42   LDL Cholesterol      59   Uric Acid 5.7       (A) Abnormal value       Comments are available for some flowsheets but are not being displayed.           Data reviewed: Consultant notes nephrology 5/2022          Assessment and Plan   Diagnoses and all orders for this visit:    1. Essential hypertension (Primary)  Assessment & Plan:  BP is well-controlled on Amlodipine  and Lisinopril which she will continue along with a low sodium diet.    Orders:  -     lisinopril (PRINIVIL,ZESTRIL) 20 MG tablet; Take 1 tablet by mouth Daily.  Dispense: 90 tablet; Refill: 1    2. Osteoporosis, senile  -     DEXA Bone Density Axial; Future    3. Pure hypercholesterolemia  Assessment & Plan:  She denies myalgias with simvastatin which she will continue along with a low-fat, low-cholesterol diet.        4. Acquired hypothyroidism  Assessment & Plan:  She is currently managed on Synthroid 88 mcg daily-recheck TSH      5. Gastroesophageal reflux disease without esophagitis  Assessment & Plan:  Sx managed with daily Pepcid, denies abdominal pain      6. Stage 3b chronic kidney disease (HCC)  Assessment & Plan:  Renal condition is improving with treatment.  Continue current treatment regimen.  Renal condition will be reassessed by nephrology (Dr. Spence).  Her lisinopril was recently lowered from 40 mg to 20 mg by nephrology due to elevated creatinine.  Her repeat renal function panel showed a baseline creatinine of 37.           Follow Up   Return in about 6 months (around 12/15/2022).  Patient was given instructions and counseling regarding her condition or for health maintenance advice. Please see specific information pulled into the AVS if appropriate.

## 2022-06-17 NOTE — ASSESSMENT & PLAN NOTE
Renal condition is improving with treatment.  Continue current treatment regimen.  Renal condition will be reassessed by nephrology (Dr. Spence).  Her lisinopril was recently lowered from 40 mg to 20 mg by nephrology due to elevated creatinine.  Her repeat renal function panel showed a baseline creatinine of 37.

## 2022-06-17 NOTE — ASSESSMENT & PLAN NOTE
BP is well-controlled on Amlodipine and Lisinopril which she will continue along with a low sodium diet.

## 2022-06-19 DIAGNOSIS — E03.9 ACQUIRED HYPOTHYROIDISM: ICD-10-CM

## 2022-06-20 RX ORDER — LEVOTHYROXINE SODIUM 88 UG/1
TABLET ORAL
Qty: 90 TABLET | Refills: 1 | Status: SHIPPED | OUTPATIENT
Start: 2022-06-20 | End: 2022-12-07

## 2022-08-02 ENCOUNTER — HOSPITAL ENCOUNTER (OUTPATIENT)
Dept: BONE DENSITY | Facility: HOSPITAL | Age: 82
Discharge: HOME | End: 2022-08-02
Admitting: NURSE PRACTITIONER

## 2022-08-02 DIAGNOSIS — M81.0 OSTEOPOROSIS, SENILE: Chronic | ICD-10-CM

## 2022-08-02 PROCEDURE — 77080 DXA BONE DENSITY AXIAL: CPT

## 2022-08-15 ENCOUNTER — TELEPHONE (OUTPATIENT)
Dept: INTERNAL MEDICINE | Facility: CLINIC | Age: 82
End: 2022-08-15

## 2022-08-15 NOTE — TELEPHONE ENCOUNTER
Please advise on [employer] message from 08/14/2022. (Lost the message so I'm sending it this way)    See imaging encounter 06/15/2022.

## 2022-08-16 ENCOUNTER — TELEPHONE (OUTPATIENT)
Dept: INTERNAL MEDICINE | Facility: CLINIC | Age: 82
End: 2022-08-16

## 2022-08-16 NOTE — TELEPHONE ENCOUNTER
Patient is calling about her prolia injections. See previous encounters. Patient states she has not got a [facility] [employer] message from you. Please advise.

## 2022-08-17 DIAGNOSIS — M81.0 OSTEOPOROSIS, SENILE: Primary | ICD-10-CM

## 2022-09-16 DIAGNOSIS — E78.5 HYPERLIPIDEMIA, UNSPECIFIED HYPERLIPIDEMIA TYPE: ICD-10-CM

## 2022-09-16 RX ORDER — SIMVASTATIN 20 MG
TABLET ORAL
Qty: 90 TABLET | Refills: 1 | Status: SHIPPED | OUTPATIENT
Start: 2022-09-16 | End: 2023-04-06

## 2022-09-23 ENCOUNTER — INFUSION (OUTPATIENT)
Dept: ONCOLOGY | Facility: HOSPITAL | Age: 82
End: 2022-09-23

## 2022-09-23 ENCOUNTER — LAB (OUTPATIENT)
Dept: OTHER | Facility: HOSPITAL | Age: 82
End: 2022-09-23

## 2022-09-23 VITALS — BODY MASS INDEX: 23.45 KG/M2 | RESPIRATION RATE: 18 BRPM | HEIGHT: 63 IN | TEMPERATURE: 97 F

## 2022-09-23 DIAGNOSIS — M81.0 OSTEOPOROSIS, SENILE: Primary | ICD-10-CM

## 2022-09-23 DIAGNOSIS — M81.0 OSTEOPOROSIS, SENILE: ICD-10-CM

## 2022-09-23 LAB
25(OH)D3 SERPL-MCNC: 70.7 NG/ML (ref 30–100)
ALBUMIN SERPL-MCNC: 4.7 G/DL (ref 3.5–5.2)
ALBUMIN/GLOB SERPL: 1.5 G/DL
ALP SERPL-CCNC: 63 U/L (ref 39–117)
ALT SERPL W P-5'-P-CCNC: 18 U/L (ref 1–33)
ANION GAP SERPL CALCULATED.3IONS-SCNC: 11.5 MMOL/L (ref 5–15)
AST SERPL-CCNC: 20 U/L (ref 1–32)
BILIRUB SERPL-MCNC: 0.4 MG/DL (ref 0–1.2)
BUN SERPL-MCNC: 34 MG/DL (ref 8–23)
BUN/CREAT SERPL: 21.9 (ref 7–25)
CALCIUM SPEC-SCNC: 9.6 MG/DL (ref 8.6–10.5)
CHLORIDE SERPL-SCNC: 101 MMOL/L (ref 98–107)
CO2 SERPL-SCNC: 20.5 MMOL/L (ref 22–29)
CREAT SERPL-MCNC: 1.55 MG/DL (ref 0.57–1)
EGFRCR SERPLBLD CKD-EPI 2021: 33.5 ML/MIN/1.73
GLOBULIN UR ELPH-MCNC: 3.1 GM/DL
GLUCOSE SERPL-MCNC: 108 MG/DL (ref 65–99)
MAGNESIUM SERPL-MCNC: 2.2 MG/DL (ref 1.6–2.4)
PHOSPHATE SERPL-MCNC: 4.1 MG/DL (ref 2.5–4.5)
POTASSIUM SERPL-SCNC: 4.7 MMOL/L (ref 3.5–5.2)
PROT SERPL-MCNC: 7.8 G/DL (ref 6–8.5)
SODIUM SERPL-SCNC: 133 MMOL/L (ref 136–145)

## 2022-09-23 PROCEDURE — 96372 THER/PROPH/DIAG INJ SC/IM: CPT

## 2022-09-23 PROCEDURE — 82306 VITAMIN D 25 HYDROXY: CPT | Performed by: NURSE PRACTITIONER

## 2022-09-23 PROCEDURE — 84100 ASSAY OF PHOSPHORUS: CPT | Performed by: NURSE PRACTITIONER

## 2022-09-23 PROCEDURE — 25010000002 DENOSUMAB 60 MG/ML SOLUTION PREFILLED SYRINGE: Performed by: NURSE PRACTITIONER

## 2022-09-23 PROCEDURE — 83735 ASSAY OF MAGNESIUM: CPT | Performed by: NURSE PRACTITIONER

## 2022-09-23 PROCEDURE — 80053 COMPREHEN METABOLIC PANEL: CPT | Performed by: NURSE PRACTITIONER

## 2022-09-23 RX ADMIN — DENOSUMAB 60 MG: 60 INJECTION SUBCUTANEOUS at 16:02

## 2022-09-23 NOTE — NURSING NOTE
Arrived  for prolia injection. Indication and side effects reviewed. Denies recent dental work. Labs and medications verified. Prolia administered in L  arm without incidence. Instructed to call prescribing MD for any concerns or questions and instructed on how to schedule future appts.  Pt vu and discharged in stable condition.

## 2022-11-07 DIAGNOSIS — I10 ESSENTIAL HYPERTENSION: ICD-10-CM

## 2022-11-07 RX ORDER — AMLODIPINE BESYLATE 10 MG/1
TABLET ORAL
Qty: 90 TABLET | Refills: 3 | Status: SHIPPED | OUTPATIENT
Start: 2022-11-07

## 2022-11-16 DIAGNOSIS — H91.93 BILATERAL HEARING LOSS, UNSPECIFIED HEARING LOSS TYPE: Primary | ICD-10-CM

## 2022-12-06 DIAGNOSIS — E03.9 ACQUIRED HYPOTHYROIDISM: ICD-10-CM

## 2022-12-07 RX ORDER — LEVOTHYROXINE SODIUM 88 UG/1
TABLET ORAL
Qty: 90 TABLET | Refills: 1 | Status: SHIPPED | OUTPATIENT
Start: 2022-12-07

## 2022-12-16 ENCOUNTER — OFFICE VISIT (OUTPATIENT)
Dept: INTERNAL MEDICINE | Facility: CLINIC | Age: 82
End: 2022-12-16
Payer: MEDICARE

## 2022-12-16 VITALS
HEIGHT: 63 IN | TEMPERATURE: 97.8 F | OXYGEN SATURATION: 97 % | DIASTOLIC BLOOD PRESSURE: 78 MMHG | BODY MASS INDEX: 24.34 KG/M2 | WEIGHT: 137.4 LBS | HEART RATE: 69 BPM | SYSTOLIC BLOOD PRESSURE: 126 MMHG

## 2022-12-16 DIAGNOSIS — N18.32 STAGE 3B CHRONIC KIDNEY DISEASE: Chronic | ICD-10-CM

## 2022-12-16 DIAGNOSIS — E03.9 ACQUIRED HYPOTHYROIDISM: Chronic | ICD-10-CM

## 2022-12-16 DIAGNOSIS — E78.00 PURE HYPERCHOLESTEROLEMIA: Chronic | ICD-10-CM

## 2022-12-16 DIAGNOSIS — M81.0 OSTEOPOROSIS, SENILE: Chronic | ICD-10-CM

## 2022-12-16 DIAGNOSIS — Z00.00 MEDICARE ANNUAL WELLNESS VISIT, SUBSEQUENT: Primary | ICD-10-CM

## 2022-12-16 DIAGNOSIS — K21.9 GASTROESOPHAGEAL REFLUX DISEASE WITHOUT ESOPHAGITIS: Chronic | ICD-10-CM

## 2022-12-16 DIAGNOSIS — I10 ESSENTIAL HYPERTENSION: Chronic | ICD-10-CM

## 2022-12-16 PROCEDURE — 1126F AMNT PAIN NOTED NONE PRSNT: CPT | Performed by: NURSE PRACTITIONER

## 2022-12-16 PROCEDURE — G0439 PPPS, SUBSEQ VISIT: HCPCS | Performed by: NURSE PRACTITIONER

## 2022-12-16 PROCEDURE — 1159F MED LIST DOCD IN RCRD: CPT | Performed by: NURSE PRACTITIONER

## 2022-12-16 PROCEDURE — 99214 OFFICE O/P EST MOD 30 MIN: CPT | Performed by: NURSE PRACTITIONER

## 2022-12-16 PROCEDURE — 1170F FXNL STATUS ASSESSED: CPT | Performed by: NURSE PRACTITIONER

## 2022-12-16 RX ORDER — AMOXICILLIN 500 MG/1
CAPSULE ORAL
COMMUNITY
Start: 2022-12-12

## 2022-12-16 RX ORDER — MELATONIN
1000 EVERY OTHER DAY
Qty: 30 TABLET | Refills: 5
Start: 2022-12-16

## 2022-12-16 NOTE — PROGRESS NOTES
The ABCs of the Annual Wellness Visit  Subsequent Medicare Wellness Visit    Subjective    Sylvia Stover is a 82 y.o. female who presents for a Subsequent Medicare Wellness Visit.    The following portions of the patient's history were reviewed and   updated as appropriate: {history reviewed:20406::\"allergies\",\"current medications\",\"past family history\",\"past medical history\",\"past social history\",\"past surgical history\",\"problem list\"}.    Compared to one year ago, the patient feels her physical health is the same.    Compared to one year ago, the patient feels her mental   health is {better worse same:14561}.    Recent Hospitalizations:  {Hospital Admission Status in the last 365 days:83927}      Current Medical Providdfers:  Patient Care Team:  Rhoda Luna APRN as PCP - General (Internal Medicine)  Jadiel Spence MD as Consulting Physician (Nephrology)    Outpatient Medications Prior to Visit   Medication Sig Dispense Refill   • acetaminophen (TYLENOL) 500 MG tablet Take 1 tablet by mouth Every 6 (Six) Hours As Needed.     • amLODIPine (NORVASC) 10 MG tablet TAKE 1 TABLET DAILY 90 tablet 3   • amoxicillin (AMOXIL) 500 MG capsule      • budesonide (PULMICORT) 90 MCG/ACT inhaler Inhale 1 puff 2 (Two) Times a Day As Needed (shortness of breath). Rinse and spit after use 3 each 1   • famotidine (PEPCID) 20 MG tablet Take 20 mg by mouth Daily.     • ferrous sulfate 325 (65 FE) MG tablet Take 325 mg by mouth 2 (two) times a day.     • levothyroxine (SYNTHROID, LEVOTHROID) 88 MCG tablet TAKE 1 TABLET DAILY 90 tablet 1   • lisinopril (PRINIVIL,ZESTRIL) 20 MG tablet Take 1 tablet by mouth Daily. 90 tablet 1   • meclizine (ANTIVERT) 12.5 MG tablet 1-2 tablets every 8 hours as needed 30 tablet 2   • Multiple Vitamins-Minerals (MULTIVITAMIN PO) Take  by mouth.     • simvastatin (ZOCOR) 20 MG tablet TAKE 1 TABLET NIGHTLY AT   BEDTIME 90 tablet 1   • Cholecalciferol (VITAMIN D) 2000 UNITS capsule Take 1,000 Units  by mouth.     • diclofenac (VOLTAREN) 1 % gel gel Apply 4 g topically 4 (Four) Times a Day As Needed (pain). 300 g 1     No facility-administered medications prior to visit.       No opioid medication identified on active medication list. I have reviewed chart for other potential  high risk medication/s and harmful drug interactions in the elderly.          Aspirin is not on active medication list.  {ASPIRIN NOT ON MEDICATION LIST INDICATED/NOT INDICATED:65356}.    Patient Active Problem List   Diagnosis   • Acquired hypothyroidism   • Essential hypertension   • Hyperlipidemia   • Stage 3 chronic kidney disease (HCC)   • Osteoarthritis of right knee   • Osteoporosis, senile   • APC (atrial premature contractions)   • Carpal tunnel syndrome   • Mobitz type 2 second degree AV block   • PSVT (paroxysmal supraventricular tachycardia) (HCC)   • Bronchospasm   • Great toe pain, left   • Gastroesophageal reflux disease without esophagitis     Advance Care Planning  Advance Directive is not on file.  {ACP Discussion, Advance Directive not in EMR:38739}     Objective    Vitals:    12/16/22 1339   BP: 126/78   BP Location: Left arm   Patient Position: Sitting   Cuff Size: Adult   Pulse: 69   Temp: 97.8 °F (36.6 °C)   TempSrc: Temporal   SpO2: 97%   Weight: 62.3 kg (137 lb 6.4 oz)   Height: 160 cm (63\")   PainSc: 0-No pain     Estimated body mass index is 24.34 kg/m² as calculated from the following:    Height as of this encounter: 160 cm (63\").    Weight as of this encounter: 62.3 kg (137 lb 6.4 oz).    BMI is within normal parameters. No other follow-up for BMI required.      Does the patient have evidence of cognitive impairment? {Yes/No:06470}          HEALTH RISK ASSESSMENT    Smoking Status:  Social History     Tobacco Use   Smoking Status Former   • Packs/day: 0.25   • Years: 0.00   • Pack years: 0.00   • Types: Cigarettes   • Start date: 1/1/1961   • Quit date: 1/1/2007   • Years since quitting: 15.9   Smokeless  Tobacco Never   Tobacco Comments    My tobacco use varied from year to year.  When I quit I was smoking three cigarettes a week.     Alcohol Consumption:  Social History     Substance and Sexual Activity   Alcohol Use No    Comment: caffeine1 cup daily     Fall Risk Screen:    KYLAH Fall Risk Assessment was completed, and patient is at LOW risk for falls.Assessment completed on:12/16/2022    Depression Screening:  PHQ-2/PHQ-9 Depression Screening 12/16/2022   Retired PHQ-9 Total Score -   Retired Total Score -   Little Interest or Pleasure in Doing Things 0-->not at all   Feeling Down, Depressed or Hopeless 0-->not at all   PHQ-9: Brief Depression Severity Measure Score 0       Health Habits and Functional and Cognitive Screening:  Functional & Cognitive Status 12/16/2022   Do you have difficulty preparing food and eating? No   Do you have difficulty bathing yourself, getting dressed or grooming yourself? No   Do you have difficulty using the toilet? No   Do you have difficulty moving around from place to place? No   Do you have trouble with steps or getting out of a bed or a chair? No   Current Diet Well Balanced Diet   Dental Exam Up to date   Eye Exam Up to date   Exercise (times per week) 6 times per week        Exercise Frequency Comment 30 minutes a day   Current Exercises Include Walking   Current Exercise Activities Include -   Do you need help using the phone?  No   Are you deaf or do you have serious difficulty hearing?  Yes   Do you need help with transportation? No   Do you need help shopping? No   Do you need help preparing meals?  No   Do you need help with housework?  No   Do you need help with laundry? No   Do you need help taking your medications? No   Do you need help managing money? No   Do you ever drive or ride in a car without wearing a seat belt? No   Have you felt unusual stress, anger or loneliness in the last month? No   Who do you live with? Alone   If you need help, do you have trouble  finding someone available to you? No   Have you been bothered in the last four weeks by sexual problems? No   Do you have difficulty concentrating, remembering or making decisions? No       Age-appropriate Screening Schedule:  Refer to the list below for future screening recommendations based on patient's age, sex and/or medical conditions. Orders for these recommended tests are listed in the plan section. The patient has been provided with a written plan.    Health Maintenance   Topic Date Due   • LIPID PANEL  10/13/2022   • DXA SCAN  08/02/2024   • TDAP/TD VACCINES (4 - Td or Tdap) 08/16/2030   • INFLUENZA VACCINE  Completed   • ZOSTER VACCINE  Completed   • MAMMOGRAM  Discontinued                CMS Preventative Services Quick Reference  Risk Factors Identified During Encounter  {Medicare Wellness Risk Factors:32650}  The above risks/problems have been discussed with the patient.  Pertinent information has been shared with the patient in the After Visit Summary.  An After Visit Summary and PPPS were made available to the patient.    Follow Up:   Next Medicare Wellness visit to be scheduled in 1 year.   {Wrapup  Review (Popup)  Advance Care Planning  Labs  CC  Problem List  Visit Diagnosis  Medications  Result Review  Imaging  [facility] Maintenance  Quality  BestPractice  SmartSets  SnapShot  Encounters  Notes  Media  Procedures :23}    Additional E&M Note during same encounter follows:  Patient has multiple medical problems which are significant and separately identifiable that require additional work above and beyond the Medicare Wellness Visit.      Chief Complaint  Medicare Wellness-subsequent    Subjective    {Problem List  Visit Diagnosis   Encounters  Notes  Medications  Labs  Result Review Imaging  Media :23}    HPI  Sylvia Stover is also being seen today for a subsequent wellness visit.    The patient reports she is doing well. She feels that her health is about the same now as it  was 1 year ago.    She was referred to audiology for a hearing test. She states that her daughter complains that the patient does not hear well. She notes that she is unbothered by not hearing well. She has worn hearing aids for approximately 5 years. She reports that her audiologist made some adjustments to her hearing aids that were helpful. She is pleased with her audiologist at [facility]. She was informed by her audiologist that she has a buildup of wax in her right ear but it was not totally blocked. Her left ear was clear.    The patient reports she take amoxicillin prior to a dental cleaning.     She states she is taking ***unspecified dosage*** of vitamin D daily. Her last vitamin D level on 09/23/2022 was within normal range at 70 ng/mL.     Her last sodium level decreased to 133 Mmol/L. She denies any dizziness or lightheadedness. She denies drinking excess amounts of water.    She reports falling and hitting her head on a wood floor in March or April of 2022 because her dog. Her neighbor took her to the emergency room to be evaluated after her fall.    She reports sleeping well most nights, waking up once around 3:00am or 4:00am. She usually sits up and read until she is sleepy enough to fall back asleep. She reports getting up in the middle of the night to urinate.     She denies any issues taking Prolia. She expresses her displeasure with having to travel so far to get it and inquires about the possibility of getting it at the hospital in [place]. Compared to 2020 bone density scan, there is bone loss in her hip at -2.2 but her spine remains about the same.    She inquires about steps to take to enter an independent living facility.    The patient states she rarely takes meclizine.     She notes she rarely takes her Pulmicort.        Objective   Vital Signs:  /78 (BP Location: Left arm, Patient Position: Sitting, Cuff Size: Adult)   Pulse 69   Temp 97.8 °F (36.6 °C) (Temporal)    Ht 160 cm (63\")   Wt 62.3 kg (137 lb 6.4 oz)   SpO2 97%   BMI 24.34 kg/m²     Physical Exam     {The following data was reviewed by (Optional):30710}  {Ambulatory Labs (Optional):81993}  {Data reviewed (Optional):80775:::1}           Assessment and Plan {CC Problem List  Visit Diagnosis   ROS  Review (Popup)  Health Maintenance  Quality  BestPractice  Medications  SmartSets  SnapShot Encounters  Media :23}  Diagnoses and all orders for this visit:    1. Medicare annual wellness visit, subsequent (Primary)    2. Essential hypertension  -     Basic Metabolic Panel    3. Pure hypercholesterolemia  -     Lipid Panel    4. Acquired hypothyroidism  -     TSH    5. Gastroesophageal reflux disease without esophagitis    6. Stage 3b chronic kidney disease (HCC)    7. Osteoporosis, senile  -     cholecalciferol (Vitamin D) 25 MCG (1000 UT) tablet; Take 1 tablet by mouth Every Other Day.  Dispense: 30 tablet; Refill: 5           {Time Spent (Optional):70043}  Follow Up {Instructions Charge Capture  Follow-up Communications :23}  Return in about 6 months (around 6/16/2023).  Patient was given instructions and counseling regarding her condition or for health maintenance advice. Please see specific information pulled into the AVS if appropriate.         Answers for HPI/ROS submitted by the patient on 12/13/2022  Please describe your symptoms.: This is a regular check-up  Have you had these symptoms before?: Yes  How long have you been having these symptoms?: Greater than 2 weeks  What is the primary reason for your visit?: Other    Transcribed from ambient dictation for JUNE Mohr by Denzel Bush.  12/16/22   19:47 EST    {FER Provider Statement:01439::\"Patient or patient representative verbalized consent to the visit recording.\",\"I have personally performed the services described in this document as transcribed by the above individual, and it is both accurate and complete.\"}

## 2022-12-17 LAB
BUN SERPL-MCNC: 26 MG/DL (ref 8–23)
BUN/CREAT SERPL: 19.5 (ref 7–25)
CALCIUM SERPL-MCNC: 9.5 MG/DL (ref 8.6–10.5)
CHLORIDE SERPL-SCNC: 101 MMOL/L (ref 98–107)
CHOLEST SERPL-MCNC: 129 MG/DL (ref 0–200)
CO2 SERPL-SCNC: 24.2 MMOL/L (ref 22–29)
CREAT SERPL-MCNC: 1.33 MG/DL (ref 0.57–1)
EGFRCR SERPLBLD CKD-EPI 2021: 40.3 ML/MIN/1.73
GLUCOSE SERPL-MCNC: 103 MG/DL (ref 65–99)
HDLC SERPL-MCNC: 54 MG/DL (ref 40–60)
LDLC SERPL CALC-MCNC: 56 MG/DL (ref 0–100)
POTASSIUM SERPL-SCNC: 5 MMOL/L (ref 3.5–5.2)
SODIUM SERPL-SCNC: 138 MMOL/L (ref 136–145)
TRIGL SERPL-MCNC: 102 MG/DL (ref 0–150)
TSH SERPL DL<=0.005 MIU/L-ACNC: 1.65 UIU/ML (ref 0.27–4.2)
VLDLC SERPL CALC-MCNC: 19 MG/DL (ref 5–40)

## 2022-12-17 NOTE — PROGRESS NOTES
The ABCs of the Annual Wellness Visit  Subsequent Medicare Wellness Visit    Subjective    Sylvia Stover is a 82 y.o. female who presents for a Subsequent Medicare Wellness Visit.    The following portions of the patient's history were reviewed and   updated as appropriate: allergies, current medications, past family history, past medical history, past social history, past surgical history and problem list.    Compared to one year ago, the patient feels her physical health is the same.    Compared to one year ago, the patient feels her mental   health is the same.    Recent Hospitalizations:  She was not admitted to the hospital during the last year.       Current Medical Providers:  Patient Care Team:  Rhoda Luna APRN as PCP - General (Internal Medicine)  Jadiel Spence MD as Consulting Physician (Nephrology)    Outpatient Medications Prior to Visit   Medication Sig Dispense Refill   • acetaminophen (TYLENOL) 500 MG tablet Take 1 tablet by mouth Every 6 (Six) Hours As Needed.     • amLODIPine (NORVASC) 10 MG tablet TAKE 1 TABLET DAILY 90 tablet 3   • amoxicillin (AMOXIL) 500 MG capsule      • budesonide (PULMICORT) 90 MCG/ACT inhaler Inhale 1 puff 2 (Two) Times a Day As Needed (shortness of breath). Rinse and spit after use 3 each 1   • famotidine (PEPCID) 20 MG tablet Take 20 mg by mouth Daily.     • ferrous sulfate 325 (65 FE) MG tablet Take 325 mg by mouth 2 (two) times a day.     • levothyroxine (SYNTHROID, LEVOTHROID) 88 MCG tablet TAKE 1 TABLET DAILY 90 tablet 1   • meclizine (ANTIVERT) 12.5 MG tablet 1-2 tablets every 8 hours as needed 30 tablet 2   • Multiple Vitamins-Minerals (MULTIVITAMIN PO) Take  by mouth.     • simvastatin (ZOCOR) 20 MG tablet TAKE 1 TABLET NIGHTLY AT   BEDTIME 90 tablet 1   • Cholecalciferol (VITAMIN D) 2000 UNITS capsule Take 1,000 Units by mouth.     • diclofenac (VOLTAREN) 1 % gel gel Apply 4 g topically 4 (Four) Times a Day As Needed (pain). 300 g 1   • lisinopril  (PRINIVIL,ZESTRIL) 20 MG tablet Take 1 tablet by mouth Daily. 90 tablet 1     No facility-administered medications prior to visit.       No opioid medication identified on active medication list. I have reviewed chart for other potential  high risk medication/s and harmful drug interactions in the elderly.          Aspirin is not on active medication list.  Aspirin use is not indicated based on review of current medical condition/s. Risk of harm outweighs potential benefits.  .    Patient Active Problem List   Diagnosis   • Acquired hypothyroidism   • Essential hypertension   • Hyperlipidemia   • Stage 3 chronic kidney disease (HCC)   • Osteoarthritis of right knee   • Osteoporosis, senile   • APC (atrial premature contractions)   • Carpal tunnel syndrome   • Mobitz type 2 second degree AV block   • PSVT (paroxysmal supraventricular tachycardia) (HCC)   • Bronchospasm   • Great toe pain, left   • Gastroesophageal reflux disease without esophagitis     Advance Care Planning  Advance Directive is not on file.  ACP discussion was held with the patient during this visit. Patient has an advance directive (not in EMR), copy requested.     Objective    Vitals:    12/16/22 1339   BP: 126/78   BP Location: Left arm   Patient Position: Sitting   Cuff Size: Adult   Pulse: 69   Temp: 97.8 °F (36.6 °C)   TempSrc: Temporal   SpO2: 97%   Weight: 62.3 kg (137 lb 6.4 oz)   Height: 160 cm (63\")   PainSc: 0-No pain     Estimated body mass index is 24.34 kg/m² as calculated from the following:    Height as of this encounter: 160 cm (63\").    Weight as of this encounter: 62.3 kg (137 lb 6.4 oz).    BMI is within normal parameters. No other follow-up for BMI required.      Does the patient have evidence of cognitive impairment? No    Lab Results   Component Value Date    CHLPL 129 12/16/2022    TRIG 102 12/16/2022    HDL 54 12/16/2022    LDL 56 12/16/2022    VLDL 19 12/16/2022        HEALTH RISK ASSESSMENT    Smoking Status:  Social  History     Tobacco Use   Smoking Status Former   • Packs/day: 0.25   • Years: 0.00   • Pack years: 0.00   • Types: Cigarettes   • Start date: 1961   • Quit date: 2007   • Years since quittin.0   Smokeless Tobacco Never   Tobacco Comments    My tobacco use varied from year to year.  When I quit I was smoking three cigarettes a week.     Alcohol Consumption:  Social History     Substance and Sexual Activity   Alcohol Use No    Comment: caffeine1 cup daily     Fall Risk Screen:    DEVANADI Fall Risk Assessment has not been completed.    Depression Screening:  PHQ-2/PHQ-9 Depression Screening 2022   Retired PHQ-9 Total Score -   Retired Total Score -   Little Interest or Pleasure in Doing Things 0-->not at all   Feeling Down, Depressed or Hopeless 0-->not at all   PHQ-9: Brief Depression Severity Measure Score 0       Health Habits and Functional and Cognitive Screening:  Functional & Cognitive Status 2022   Do you have difficulty preparing food and eating? No   Do you have difficulty bathing yourself, getting dressed or grooming yourself? No   Do you have difficulty using the toilet? No   Do you have difficulty moving around from place to place? No   Do you have trouble with steps or getting out of a bed or a chair? No   Current Diet Well Balanced Diet   Dental Exam Up to date   Eye Exam Up to date   Exercise (times per week) 6 times per week        Exercise Frequency Comment 30 minutes a day   Current Exercises Include Walking   Current Exercise Activities Include -   Do you need help using the phone?  No   Are you deaf or do you have serious difficulty hearing?  Yes   Do you need help with transportation? No   Do you need help shopping? No   Do you need help preparing meals?  No   Do you need help with housework?  No   Do you need help with laundry? No   Do you need help taking your medications? No   Do you need help managing money? No   Do you ever drive or ride in a car without wearing a  seat belt? No   Have you felt unusual stress, anger or loneliness in the last month? No   Who do you live with? Alone   If you need help, do you have trouble finding someone available to you? No   Have you been bothered in the last four weeks by sexual problems? No   Do you have difficulty concentrating, remembering or making decisions? No       Age-appropriate Screening Schedule:  Refer to the list below for future screening recommendations based on patient's age, sex and/or medical conditions. Orders for these recommended tests are listed in the plan section. The patient has been provided with a written plan.    Health Maintenance   Topic Date Due   • LIPID PANEL  12/16/2023   • DXA SCAN  08/02/2024   • TDAP/TD VACCINES (4 - Td or Tdap) 08/16/2030   • INFLUENZA VACCINE  Completed   • ZOSTER VACCINE  Completed   • MAMMOGRAM  Discontinued                CMS Preventative Services Quick Reference  Risk Factors Identified During Encounter  Fall Risk-High or Moderate: Discussed Fall Prevention in the home  The above risks/problems have been discussed with the patient.  Pertinent information has been shared with the patient in the After Visit Summary.  An After Visit Summary and PPPS were made available to the patient.    Follow Up:   Next Medicare Wellness visit to be scheduled in 1 year.       Additional E&M Note during same encounter follows:  Patient has multiple medical problems which are significant and separately identifiable that require additional work above and beyond the Medicare Wellness Visit.      Chief Complaint  Medicare Wellness-subsequent    Subjective        HPI  Sylvia Stover is also being seen today for a subsequent wellness visit.    The patient reports she is doing well. She was referred to audiology for a hearing test and has worn hearing aids for approximately 5 years. She reports that her audiologist made some adjustments to her hearing aids that were helpful. She was informed by her audiologist  that she has a buildup of wax in her right ear.    The patient reports she take amoxicillin prior to a dental cleaning.     She states she is taking 1,000 units of vitamin D daily. Her last vitamin D level on 09/23/2022 was within normal range at 70 ng/mL.     Her last sodium level decreased to 133 Mmol/L. She denies any dizziness or lightheadedness. She denies drinking excess amounts of water.    She reports falling and hitting her head on a wood floor in March or April of 2022 because her dog. Her neighbor took her to the emergency room to be evaluated after her fall, CT head and pelvis did not show any acute abnl.    She reports sleeping well most nights, waking up once around 3:00am or 4:00am. She usually sits up and read until she is sleepy enough to fall back asleep. She reports getting up in the middle of the night to urinate.     She denies any issues taking Prolia. She expresses her displeasure with having to travel so far to get it and inquires about the possibility of getting it at the hospital in [place]. Compared to 2020 bone density scan, there is bone loss in her hip at -2.2 but her spine remains about the same.    She inquires about steps to take to enter an independent living facility.    The patient states she rarely takes meclizine.     She notes she rarely needs her Pulmicort inhaler.       Objective   Vital Signs:  /78 (BP Location: Left arm, Patient Position: Sitting, Cuff Size: Adult)   Pulse 69   Temp 97.8 °F (36.6 °C) (Temporal)   Ht 160 cm (63\")   Wt 62.3 kg (137 lb 6.4 oz)   SpO2 97%   BMI 24.34 kg/m²     Physical Exam  Constitutional:       Appearance: She is well-developed. She is not ill-appearing.   HENT:      Head: Normocephalic.      Right Ear: Hearing, tympanic membrane and external ear normal.      Left Ear: Hearing, tympanic membrane and external ear normal.      Nose: Nose normal. No nasal deformity, mucosal edema or rhinorrhea.      Right Sinus: No maxillary  sinus tenderness or frontal sinus tenderness.      Left Sinus: No maxillary sinus tenderness or frontal sinus tenderness.      Mouth/Throat:      Dentition: Normal dentition.   Eyes:      General: Lids are normal.         Right eye: No discharge.         Left eye: No discharge.      Conjunctiva/sclera: Conjunctivae normal.      Right eye: No exudate.     Left eye: No exudate.  Neck:      Thyroid: No thyroid mass or thyromegaly.      Vascular: No carotid bruit.      Trachea: Trachea normal.   Cardiovascular:      Rate and Rhythm: Regular rhythm.      Pulses: Normal pulses.      Heart sounds: Normal heart sounds. No murmur heard.  Pulmonary:      Effort: No respiratory distress.      Breath sounds: Normal breath sounds. No decreased breath sounds, wheezing, rhonchi or rales.   Abdominal:      General: Bowel sounds are normal.      Palpations: Abdomen is soft.      Tenderness: There is no abdominal tenderness.   Musculoskeletal:      Cervical back: Normal range of motion. No edema.   Lymphadenopathy:      Head:      Right side of head: No submental, submandibular, tonsillar, preauricular, posterior auricular or occipital adenopathy.      Left side of head: No submental, submandibular, tonsillar, preauricular, posterior auricular or occipital adenopathy.   Skin:     General: Skin is warm and dry.      Nails: There is no clubbing.   Neurological:      Mental Status: She is alert.   Psychiatric:         Behavior: Behavior is cooperative.          The following data was reviewed by: JUNE Mohr on 12/16/2022:  Common labs    Common Labs 9/23/22 12/16/22 12/16/22     1417 1417   Glucose 108 (A)  103 (A)   BUN 34 (A)  26 (A)   Creatinine 1.55 (A)  1.33 (A)   Sodium 133 (A)  138   Potassium 4.7  5.0   Chloride 101  101   Calcium 9.6  9.5   Albumin 4.70     Total Bilirubin 0.4     Alkaline Phosphatase 63     AST (SGOT) 20     ALT (SGPT) 18     Total Cholesterol  129    Triglycerides  102    HDL Cholesterol  54     LDL Cholesterol   56    (A) Abnormal value       Comments are available for some flowsheets but are not being displayed.           Data reviewed: Radiologic studies DEXA scan 8/2/22           Assessment and Plan   Diagnoses and all orders for this visit:    1. Medicare annual wellness visit, subsequent (Primary)    2. Essential hypertension  Assessment & Plan:  Her BP is well-controlled on amlodipine and lisinopril which she will continue along with a low sodium diet.    Orders:  -     Basic Metabolic Panel    3. Pure hypercholesterolemia  Assessment & Plan:  She denies myalgias with simvastatin which she will continue along with a low-fat, low-cholesterol diet.      Orders:  -     Lipid Panel    4. Acquired hypothyroidism  Assessment & Plan:  She is currently managed on Synthroid 88 mcg daily for replacement    Orders:  -     TSH    5. Gastroesophageal reflux disease without esophagitis  Assessment & Plan:  Sx stable with Pepcid, denies abdominal pain (avoiding PPIs due CKD).      6. Stage 3b chronic kidney disease (HCC)  Assessment & Plan:  She is followed by nephrology (Dr. Spence), kidney function stable with recent labs-recheck today.      7. Osteoporosis, senile  Assessment & Plan:  Reviewed bone density from 8/2022 which is stable-continue Prolia injections along with weightbearing activity and Vitamin D supplement (decrease to every other day).    Orders:  -     cholecalciferol (Vitamin D) 25 MCG (1000 UT) tablet; Take 1 tablet by mouth Every Other Day.  Dispense: 30 tablet; Refill: 5         Follow Up   Return in about 6 months (around 6/16/2023).  Patient was given instructions and counseling regarding her condition or for health maintenance advice. Please see specific information pulled into the AVS if appropriate.         Answers for HPI/ROS submitted by the patient on 12/13/2022  Please describe your symptoms.: This is a regular check-up  Have you had these symptoms before?: Yes  How long have you  been having these symptoms?: Greater than 2 weeks  What is the primary reason for your visit?: Other    Transcribed from ambient dictation for JUNE Mohr by Denzel Bush.  12/16/22   19:47 EST    Patient or patient representative verbalized consent to the visit recording.  I have personally performed the services described in this document as transcribed by the above individual, and it is both accurate and complete.

## 2022-12-27 DIAGNOSIS — I10 ESSENTIAL HYPERTENSION: Chronic | ICD-10-CM

## 2022-12-27 RX ORDER — LISINOPRIL 20 MG/1
20 TABLET ORAL DAILY
Qty: 90 TABLET | Refills: 1 | Status: SHIPPED | OUTPATIENT
Start: 2022-12-27

## 2022-12-30 ENCOUNTER — APPOINTMENT (OUTPATIENT)
Dept: BONE DENSITY | Facility: HOSPITAL | Age: 82
End: 2022-12-30

## 2023-01-03 NOTE — ASSESSMENT & PLAN NOTE
Her BP is well-controlled on amlodipine and lisinopril which she will continue along with a low sodium diet.

## 2023-01-03 NOTE — PATIENT INSTRUCTIONS
Medicare Wellness  Personal Prevention Plan of Service     Date of Office Visit:    Encounter Provider:  JUNE Mohr  Place of Service:  [facility]  Patient Name: Sylvia Stover  :  1940    As part of the Medicare Wellness portion of your visit today, we are providing you with this personalized preventive plan of services (PPPS). This plan is based upon recommendations of the [place] Preventive Services Task Force (USPSTF) and the Advisory Committee on Immunization Practices (ACIP).    This lists the preventive care services that should be considered, and provides dates of when you are due. Items listed as completed are up-to-date and do not require any further intervention.    Health Maintenance   Topic Date Due    ANNUAL WELLNESS VISIT  10/13/2022    LIPID PANEL  2023    DXA SCAN  2024    COLORECTAL CANCER SCREENING  2026    TDAP/TD VACCINES (4 - Td or Tdap) 2030    COVID-19 Vaccine  Completed    INFLUENZA VACCINE  Completed    Pneumococcal Vaccine 65+  Completed    ZOSTER VACCINE  Completed    MAMMOGRAM  Discontinued       Orders Placed This Encounter   Procedures    Lipid Panel     Order Specific Question:   [facility] Has the patient fasted?     Answer:   No    Basic Metabolic Panel     Order Specific Question:   Release to patient     Answer:   Immediate     Order Specific Question:   [facility] Has the patient fasted?     Answer:   No    TSH     Order Specific Question:   Release to patient     Answer:   Routine Release     Order Specific Question:   [facility] Has the patient fasted?     Answer:   No       Return in about 6 months (around 2023).

## 2023-01-03 NOTE — ASSESSMENT & PLAN NOTE
She is followed by nephrology (Dr. Spence), kidney function stable with recent labs-recheck today.

## 2023-01-03 NOTE — ASSESSMENT & PLAN NOTE
Reviewed bone density from 8/2022 which is stable-continue Prolia injections along with weightbearing activity and Vitamin D supplement (decrease to every other day).

## 2023-04-05 DIAGNOSIS — E78.5 HYPERLIPIDEMIA, UNSPECIFIED HYPERLIPIDEMIA TYPE: ICD-10-CM

## 2023-04-06 RX ORDER — SIMVASTATIN 20 MG
TABLET ORAL
Qty: 90 TABLET | Refills: 1 | Status: SHIPPED | OUTPATIENT
Start: 2023-04-06

## 2023-05-08 DIAGNOSIS — M81.0 OSTEOPOROSIS, SENILE: Primary | ICD-10-CM

## 2023-05-20 NOTE — PROGRESS NOTES
Continued Stay Note  [facility]     Patient Name: Sylvia Stover  MRN: [id number]  Today's Date: 8/18/2020    Admit Date: 8/15/2020    Discharge Plan     Row Name 08/18/20 1409       Plan    Final Discharge Disposition Code  03 - skilled nursing facility (SNF)    Final Note  Pt to d/c to [facility] skilled. Pt will transport via  van (pt given number to pay via telephone). Per [facility] approved with bed available [facility] 148.       Expected Discharge Date and Time     Expected Discharge Date Expected Discharge Time    Aug 18, 2020             Isabelle Dee RN     79-year-old female, history of HTN, CKD, kidney stones, hypothyroidism, DM, arthritis, just got discharged today from the hospital, sent back from nursing home due to dyspnea on exertion. No chest pain, cough or fever.  Exam shows alert comfortable and speaking in full sentences, patient in no distress, HEENT NCAT PERRL, neck supple, lungs clear, RR S1S2, abdomen soft NT +BS, no CCE, neuro A&OX3 GCS 15 no deficits.

## 2023-05-30 ENCOUNTER — HOSPITAL ENCOUNTER (OUTPATIENT)
Dept: INFUSION THERAPY | Facility: HOSPITAL | Age: 83
Discharge: HOME | End: 2023-05-30
Admitting: NURSE PRACTITIONER

## 2023-05-30 VITALS
TEMPERATURE: 97.8 F | DIASTOLIC BLOOD PRESSURE: 75 MMHG | HEART RATE: 69 BPM | BODY MASS INDEX: 26.14 KG/M2 | OXYGEN SATURATION: 99 % | RESPIRATION RATE: 18 BRPM | WEIGHT: 138.45 LBS | HEIGHT: 61 IN | SYSTOLIC BLOOD PRESSURE: 143 MMHG

## 2023-05-30 DIAGNOSIS — M81.0 OSTEOPOROSIS, SENILE: Primary | ICD-10-CM

## 2023-05-30 PROCEDURE — 96372 THER/PROPH/DIAG INJ SC/IM: CPT

## 2023-05-30 PROCEDURE — 25010000002 DENOSUMAB 60 MG/ML SOLUTION PREFILLED SYRINGE: Performed by: NURSE PRACTITIONER

## 2023-05-30 RX ADMIN — DENOSUMAB 60 MG: 60 INJECTION SUBCUTANEOUS at 13:16

## 2023-06-12 DIAGNOSIS — E03.9 ACQUIRED HYPOTHYROIDISM: ICD-10-CM

## 2023-06-12 RX ORDER — LEVOTHYROXINE SODIUM 88 UG/1
88 TABLET ORAL DAILY
Qty: 90 TABLET | Refills: 1 | Status: SHIPPED | OUTPATIENT
Start: 2023-06-12

## 2023-10-12 ENCOUNTER — LAB (OUTPATIENT)
Dept: LAB | Facility: HOSPITAL | Age: 83
End: 2023-10-12
Payer: MEDICARE

## 2023-10-12 DIAGNOSIS — M81.0 OSTEOPOROSIS, SENILE: ICD-10-CM

## 2023-10-12 DIAGNOSIS — E78.5 HYPERLIPIDEMIA, UNSPECIFIED HYPERLIPIDEMIA TYPE: ICD-10-CM

## 2023-10-12 LAB
ALBUMIN SERPL-MCNC: 4.6 G/DL (ref 3.5–5.2)
ALBUMIN/GLOB SERPL: 1.5 G/DL
ALP SERPL-CCNC: 55 U/L (ref 39–117)
ALT SERPL W P-5'-P-CCNC: 15 U/L (ref 1–33)
ANION GAP SERPL CALCULATED.3IONS-SCNC: 13 MMOL/L (ref 5–15)
AST SERPL-CCNC: 17 U/L (ref 1–32)
BILIRUB SERPL-MCNC: 0.4 MG/DL (ref 0–1.2)
BUN SERPL-MCNC: 27 MG/DL (ref 8–23)
BUN/CREAT SERPL: 16.7 (ref 7–25)
CALCIUM SPEC-SCNC: 10.1 MG/DL (ref 8.6–10.5)
CHLORIDE SERPL-SCNC: 99 MMOL/L (ref 98–107)
CO2 SERPL-SCNC: 21 MMOL/L (ref 22–29)
CREAT SERPL-MCNC: 1.62 MG/DL (ref 0.57–1)
EGFRCR SERPLBLD CKD-EPI 2021: 31.6 ML/MIN/1.73
GLOBULIN UR ELPH-MCNC: 3 GM/DL
GLUCOSE SERPL-MCNC: 107 MG/DL (ref 65–99)
POTASSIUM SERPL-SCNC: 4.5 MMOL/L (ref 3.5–5.2)
PROT SERPL-MCNC: 7.6 G/DL (ref 6–8.5)
SODIUM SERPL-SCNC: 133 MMOL/L (ref 136–145)

## 2023-10-12 PROCEDURE — 80053 COMPREHEN METABOLIC PANEL: CPT

## 2023-10-12 RX ORDER — SIMVASTATIN 20 MG
TABLET ORAL
Qty: 90 TABLET | Refills: 1 | Status: SHIPPED | OUTPATIENT
Start: 2023-10-12

## 2023-10-21 ENCOUNTER — APPOINTMENT (OUTPATIENT)
Dept: CT IMAGING | Facility: HOSPITAL | Age: 83
End: 2023-10-21
Payer: MEDICARE

## 2023-10-21 PROCEDURE — 70450 CT HEAD/BRAIN W/O DYE: CPT

## 2023-10-21 PROCEDURE — 99284 EMERGENCY DEPT VISIT MOD MDM: CPT

## 2023-10-22 ENCOUNTER — HOSPITAL ENCOUNTER (EMERGENCY)
Facility: HOSPITAL | Age: 83
Discharge: HOME | End: 2023-10-22
Attending: EMERGENCY MEDICINE | Admitting: EMERGENCY MEDICINE
Payer: MEDICARE

## 2023-10-22 VITALS
HEART RATE: 73 BPM | OXYGEN SATURATION: 98 % | SYSTOLIC BLOOD PRESSURE: 137 MMHG | HEIGHT: 61 IN | TEMPERATURE: 97.9 F | DIASTOLIC BLOOD PRESSURE: 80 MMHG | RESPIRATION RATE: 16 BRPM | WEIGHT: 141.54 LBS | BODY MASS INDEX: 26.72 KG/M2

## 2023-10-22 DIAGNOSIS — R41.3 MEMORY DEFICIT: Primary | ICD-10-CM

## 2023-10-22 LAB
BACTERIA UR QL AUTO: ABNORMAL /HPF
BILIRUB UR QL STRIP: NEGATIVE
CLARITY UR: CLEAR
COLOR UR: YELLOW
GLUCOSE UR STRIP-MCNC: NEGATIVE MG/DL
HGB UR QL STRIP.AUTO: NEGATIVE
HYALINE CASTS UR QL AUTO: ABNORMAL /LPF
KETONES UR QL STRIP: NEGATIVE
LEUKOCYTE ESTERASE UR QL STRIP.AUTO: ABNORMAL
NITRITE UR QL STRIP: NEGATIVE
PH UR STRIP.AUTO: 7.5 [PH] (ref 5–8)
PROT UR QL STRIP: ABNORMAL
RBC # UR STRIP: ABNORMAL /HPF
REF LAB TEST METHOD: ABNORMAL
SP GR UR STRIP: 1.01 (ref 1–1.03)
SQUAMOUS #/AREA URNS HPF: ABNORMAL /HPF
UROBILINOGEN UR QL STRIP: ABNORMAL
WBC # UR STRIP: ABNORMAL /HPF
YEAST URNS QL MICRO: ABNORMAL /HPF

## 2023-10-22 PROCEDURE — 81001 URINALYSIS AUTO W/SCOPE: CPT

## 2023-10-22 NOTE — ED PROVIDER NOTES
"Time: 11:03 PM EDT  Date of encounter:  10/21/2023  Independent Historian/Clinical History and Information was obtained by:   Patient    History is limited by: N/A    Chief Complaint   Patient presents with    Memory Loss         History of Present Illness:    The patient presents to the emergency department for recent memory loss.  The PT states that she could not recall the password to get in to her computer. She has since remembered pieces of her password.  She denies headaches, change in vision, or any neuro deficits.  She states she has had no difficulties with speech or any one-sided weakness.  She denies any recent falls or head trauma.  She has a grossly intact neuro exam.  She denies any recent fevers.  Family states they have been with her all night and that she has been acting fine.  They state it is out of character for her to not remember her password and states that she is \"very sharp\".      Patient Care Team  Primary Care Provider: Rhoda Luna APRN    Past Medical History:     Allergies   Allergen Reactions    Codeine Nausea And Vomiting    Metoprolol Other (See Comments)     Pauses, second degree AV block      Past Medical History:   Diagnosis Date    Age related osteoporosis     Allergic 1996    Codeine causes nausea and vomiting    Anemia     Cataract 2012    Surgery on both eyes 2014    Chronic gastritis     Chronic renal insufficiency     Gastric ulcer     GERD (gastroesophageal reflux disease)     Greater trochanteric bursitis     H/O bone density study 11/2012    Osteopenia    H/O mammogram 10/28/2015    stable    HL (hearing loss)     Hyperlipidemia     Hypertension     Hypothyroidism     Lumbago     Osteoarthrosis     Osteopenia     PONV (postoperative nausea and vomiting)     Renal insufficiency     SAH (subarachnoid hemorrhage)     Sinus bradycardia     Stage 3 chronic kidney disease     Subdural hematoma     Syncope and collapse      Past Surgical History:   Procedure Laterality Date "    CARPAL TUNNEL RELEASE Right 03/11/2021    COLONOSCOPY  07/06/2006    COLONOSCOPY N/A 5/12/2016    Procedure: COLONOSCOPY into cecum/terminal ileum;  Surgeon: Huy Sam MD;  Location:  [facility];  Service:     ENDOSCOPY N/A 5/12/2016    Procedure: ESOPHAGOGASTRODUODENOSCOPY with biopsy;  Surgeon: Huy Sam MD;  Location:  [facility];  Service:     ENDOSCOPY N/A 8/12/2016    Procedure: ESOPHAGOGASTRODUODENOSCOPY WITH BIOPSIES (COLD);  Surgeon: Huy Sam MD;  Location:  [facility];  Service:     EYE SURGERY  2015    Cataract correction    FOOT SURGERY      JOINT REPLACEMENT  2014    knee replacements    KNEE SURGERY      PAP SMEAR  09/17/2013    TONSILLECTOMY      TOTAL THYROIDECTOMY      UPPER GASTROINTESTINAL ENDOSCOPY  04/21/2014    Janene Sánchez Grade IV reflux esophagitis, gastric ulcer w/clean base, chronic gastritis, no h-pylori     Family History   Problem Relation Age of Onset    Colon cancer Mother     Arthritis Mother     Cancer Mother     Hearing loss Mother     Hyperlipidemia Mother     Vision loss Mother         Cataracts    Kidney cancer Brother     Alcohol abuse Brother     Cancer Brother     Early death Brother         Cancer    Cancer Paternal Grandfather     Heart disease Father     Hyperlipidemia Father     Thyroid disease Daughter        Home Medications:  Prior to Admission medications    Medication Sig Start Date End Date Taking? Authorizing Provider   amLODIPine (NORVASC) 10 MG tablet TAKE 1 TABLET DAILY 11/7/22   Rhoda Luna APRN   budesonide (PULMICORT) 90 MCG/ACT inhaler Inhale 1 puff 2 (Two) Times a Day As Needed (shortness of breath). Rinse and spit after use 1/19/22   Rhoda Luna APRN   cholecalciferol (Vitamin D) 25 MCG (1000 UT) tablet Take 1 tablet by mouth Every Other Day. 12/16/22   Rhoda Luna APRN   Diclofenac Sodium (VOLTAREN) 1 % gel gel Apply 4 g topically to the appropriate area as directed 4 (Four) Times a Day As Needed.     ProviderEleni MD   famotidine (PEPCID) 20 MG tablet Take 1 tablet by mouth Daily.    Eleni Jimenez MD   ferrous sulfate 325 (65 FE) MG tablet Take 1 tablet by mouth 2 (two) times a day.    Eleni Jimenez MD   levothyroxine (SYNTHROID, LEVOTHROID) 88 MCG tablet Take 1 tablet by mouth Daily. 23   Rhoda Luna APRN   lisinopril (PRINIVIL,ZESTRIL) 20 MG tablet Take 1 tablet by mouth Daily. 22   Rhoda Luna APRN   meclizine (ANTIVERT) 12.5 MG tablet 1-2 tablets every 8 hours as needed 22   Rhoda Luna APRN   Multiple Vitamins-Minerals (MULTIVITAMIN PO) Take  by mouth.    Eleni Jimenez MD   simvastatin (ZOCOR) 20 MG tablet TAKE 1 TABLET NIGHTLY AT   BEDTIME 10/12/23   Rhoda Luna APRN        Social History:   Social History     Tobacco Use    Smoking status: Former     Packs/day: 0.25     Years: 0.00     Additional pack years: 0.00     Total pack years: 0.00     Types: Cigarettes     Start date: 1961     Quit date: 2007     Years since quittin.8    Smokeless tobacco: Never    Tobacco comments:     My tobacco use varied from year to year.  When I quit I was smoking three cigarettes a week.   Substance Use Topics    Alcohol use: No     Comment: caffeine1 cup daily    Drug use: No         Review of Systems:  Review of Systems   Constitutional:  Negative for chills and fever.   HENT:  Negative for congestion, ear pain and sore throat.    Eyes:  Negative for pain.   Respiratory:  Negative for cough, chest tightness and shortness of breath.    Cardiovascular:  Negative for chest pain.   Gastrointestinal:  Negative for abdominal pain, diarrhea, nausea and vomiting.   Genitourinary:  Negative for dysuria, flank pain, frequency, hematuria and urgency.   Musculoskeletal:  Negative for gait problem, joint swelling, neck pain and neck stiffness.   Skin:  Negative for pallor.   Neurological:  Negative for dizziness, seizures, facial asymmetry, speech  "difficulty, weakness, light-headedness and headaches.   All other systems reviewed and are negative.       Physical Exam:  /80   Pulse 73   Temp 97.9 °F (36.6 °C) (Oral)   Resp 16   Ht 154.9 cm (61\")   Wt 64.2 kg (141 lb 8.6 oz)   SpO2 98%   BMI 26.74 kg/m²         Physical Exam  Vitals and nursing note reviewed.   Constitutional:       General: She is not in acute distress.     Appearance: Normal appearance. She is not ill-appearing or toxic-appearing.   HENT:      Head: Normocephalic and atraumatic.   Eyes:      General: No scleral icterus.     Conjunctiva/sclera: Conjunctivae normal.      Pupils: Pupils are equal, round, and reactive to light.   Cardiovascular:      Rate and Rhythm: Normal rate and regular rhythm.      Pulses: Normal pulses.   Pulmonary:      Effort: Pulmonary effort is normal. No respiratory distress.   Musculoskeletal:         General: Normal range of motion.      Cervical back: Normal range of motion.   Skin:     General: Skin is warm and dry.      Capillary Refill: Capillary refill takes less than 2 seconds.      Findings: No rash.   Neurological:      General: No focal deficit present.      Mental Status: She is alert and oriented to person, place, and time. Mental status is at baseline.      Motor: No weakness.      Gait: Gait normal.   Psychiatric:         Mood and Affect: Mood normal.         Behavior: Behavior normal.                  Procedures:  Procedures      Medical Decision Making:      Comorbidities that affect care:    Hypothyroidism hyperlipidemia, chronic renal insufficiency, low back pain, gastric ulcer, anemia, osteoporosis, syncope and collapse, subdural hematoma, hearing loss, renal insufficiency, osteoarthritis, hypertension, greater trochanter bursitis, GERD, gastritis, postoperative nausea and vomiting, stage III kidney disease, sinus bradycardia, cataracts, osteopenia    External Notes reviewed:    None      The following orders were placed and all results " were independently analyzed by me:  Orders Placed This Encounter   Procedures    CT Head Without Contrast    Urinalysis With Microscopic If Indicated (No Culture) - Urine, Clean Catch    Urinalysis, Microscopic Only - Urine, Clean Catch       Medications Given in the Emergency Department:  Medications - No data to display     ED Course:    The patient was initially evaluated in the triage area where orders were placed. The patient was later dispositioned by JUNE Donnelly.      The patient was advised to stay for completion of workup which includes but is not limited to communication of labs and radiological results, reassessment and plan. The patient was advised that leaving prior to disposition by a provider could result in critical findings that are not communicated to the patient.     ED Course as of 10/22/23 0742   Sat Oct 21, 2023   3595   --- PROVIDER IN TRIAGE NOTE ---    Patient was seen and evaluated in triage by JUNE Streeter.  Orders were written and the patient is currently awaiting disposition.   [MS]      ED Course User Index  [MS] Judy Lugo APRN       Labs:    Lab Results (last 24 hours)       Procedure Component Value Units Date/Time    Urinalysis With Microscopic If Indicated (No Culture) - Urine, Clean Catch [[id number]]  (Abnormal) Collected: 10/22/23 0300    Specimen: Urine, Clean Catch Updated: 10/22/23 0327     Color, UA Yellow     Appearance, UA Clear     pH, UA 7.5     Specific Gravity, UA 1.011     Glucose, UA Negative     Ketones, UA Negative     Bilirubin, UA Negative     Blood, UA Negative     Protein, UA Trace     Leuk Esterase, UA Moderate (2+)     Nitrite, UA Negative     Urobilinogen, UA 0.2 E.U./dL    Urinalysis, Microscopic Only - Urine, Clean Catch [[id number]]  (Abnormal) Collected: 10/22/23 0300    Specimen: Urine, Clean Catch Updated: 10/22/23 0327     RBC, UA None Seen /HPF      WBC, UA 11-20 /HPF      Bacteria, UA 1+ /HPF      Squamous Epithelial  Cells, UA 3-6 /HPF      Yeast, UA Small/1+ Yeast /HPF      Hyaline Casts, UA 0-2 /LPF      Methodology Automated Microscopy             Imaging:    CT Head Without Contrast    Result Date: 10/21/2023  PROCEDURE: CT HEAD WO CONTRAST  COMPARISON: None.  INDICATIONS: 83-YEAR-OLD FEMALE W/ H/O MEMORY LOSS; CONFUSION.  PROTOCOL:   Standard CT imaging protocol performed.    RADIATION:   Total DLP: 1,081.2 mGy*cm.   MA and/or KV were/was adjusted to minimize radiation dose.    TECHNIQUE: After obtaining the patient's consent, 138 CT images were obtained without non-ionic intravenous contrast material.  DISCUSSION:  A routine nonenhanced head CT was performed. No acute brain abnormality is identified. No acute intracranial hemorrhage. No acute infarction. No acute skull fracture. No midline shift or acute intracranial mass effect is seen.  Mild chronic small vessel ischemia/infarction is suspected. There are arterial and basal ganglia calcifications. The extra-axial spaces and the ventricular system are prominent, suggesting central atrophy.  Nonspecific punctate extra-axial mineralization involves the left sylvian fissure (as on image 32 of series 201 and adjacent images).  It may be related to a remote infectious/inflammatory process (such as with remote neurocysticercosis, tuberculoma, or other parasitic/fungal infections).  Please correlate clinically, especially with history of seizures.  No other such findings are seen.  The patient has undergone bilateral cataract extractions with intra-ocular lens implants.  External artifacts obscure detail on the exam, especially related to the patient's earrings within the scan field of view.  There is chronic leftward nasal septal deviation with an associated small nasal spur.        No acute brain abnormality is seen.  No acute intracranial hemorrhage.  No acute infarct.  No acute skull fracture.    Please note that portions of this note were completed with a voice recognition  program.  DEUCE ELIZONDO JR, MD       Electronically Signed and Approved By: DEUCE ELIZONDO JR, MD on 10/21/2023 at 23:47                 Differential Diagnosis and Discussion:      Forgetfulness , CVA, subdural hematoma, subarachnoid bleed    CT scan radiology impression was interpreted by me.    MDM  Number of Diagnoses or Management Options  Memory deficit: new and requires workup  Risk of Complications, Morbidity, and/or Mortality  Presenting problems: low  Diagnostic procedures: low  Management options: low    Patient Progress  Patient progress: stable           Patient Care Considerations:    I considered antibiotics however the patient wanted to wait until she got back a urine culture since she was not having any urinary symptoms.      Consultants/Shared Management Plan:    None    Social Determinants of Health:    Patient is independent, reliable, and has access to care.       Disposition and Care Coordination:    Discharged: The patient is suitable and stable for discharge with no need for consideration of observation or admission.    I have explained the patient´s condition, diagnoses and treatment plan based on the information available to me at this time. I have answered questions and addressed any concerns. The patient has a good  understanding of the patient´s diagnosis, condition, and treatment plan as can be expected at this point. The vital signs have been stable. The patient´s condition is stable and appropriate for discharge from the emergency department.      The patient will pursue further outpatient evaluation with the primary care physician or other designated or consulting physician as outlined in the discharge instructions. They are agreeable to this plan of care and follow-up instructions have been explained in detail. The patient has received these instructions in written format and have expressed an understanding of the discharge instructions. The patient is aware that any significant  change in condition or worsening of symptoms should prompt an immediate return to this or the closest emergency department or call to 911.  I have explained discharge medications and the need for follow up with the patient/caretakers. This was also printed in the discharge instructions. Patient was discharged with the following medications and follow up:      Medication List      No changes were made to your prescriptions during this visit.      Rhoda Luna, APRN  [address] [address]  [phone]    Call   MONDAY, FOR FOLLOW UP       Final diagnoses:   Memory deficit        ED Disposition       ED Disposition   Discharge    Condition   Stable    Comment   --               This medical record created using voice recognition software.             Amber Nolen, APRN  10/22/23 0010

## 2023-10-22 NOTE — DISCHARGE INSTRUCTIONS
Rest, drink plenty of fluids.  You may take over-the-counter acetaminophen as needed for aches and pains and fever.  Call your family doctor on Monday and follow-up with them next week just to have a recheck.  Your urine was cultured in the emergency department today.  You will be contacted in 3 days should it grow out a bacteria that needs to be treated.  Return to the emergency department immediately for any acutely developing altered mental status, any persistent vomiting, any neurological symptoms or any new or worse concerns.

## 2023-10-22 NOTE — ED NOTES
PT REPORTS SHE FORGOT PASSWORD TO LOG ONTO COMPUTER. PT REPORTS NO PAIN, NO VISION ISSUES, NO DIZZINESS, NO NUMBNESS OR TINGLING. PT A&OX4. VITALS STABLE AT THIS TIME.

## 2023-10-23 ENCOUNTER — OFFICE VISIT (OUTPATIENT)
Dept: INTERNAL MEDICINE | Facility: CLINIC | Age: 83
End: 2023-10-23
Payer: MEDICARE

## 2023-10-23 ENCOUNTER — TELEPHONE (OUTPATIENT)
Dept: INTERNAL MEDICINE | Facility: CLINIC | Age: 83
End: 2023-10-23

## 2023-10-23 VITALS
WEIGHT: 137.2 LBS | SYSTOLIC BLOOD PRESSURE: 136 MMHG | BODY MASS INDEX: 25.9 KG/M2 | OXYGEN SATURATION: 100 % | HEIGHT: 61 IN | HEART RATE: 70 BPM | DIASTOLIC BLOOD PRESSURE: 86 MMHG

## 2023-10-23 DIAGNOSIS — R82.71 BACTERIURIA: ICD-10-CM

## 2023-10-23 DIAGNOSIS — R41.3 MEMORY LOSS: Primary | ICD-10-CM

## 2023-10-23 DIAGNOSIS — R47.89 WORD FINDING DIFFICULTY: ICD-10-CM

## 2023-10-23 PROCEDURE — 99214 OFFICE O/P EST MOD 30 MIN: CPT | Performed by: NURSE PRACTITIONER

## 2023-10-23 PROCEDURE — 1159F MED LIST DOCD IN RCRD: CPT | Performed by: NURSE PRACTITIONER

## 2023-10-23 PROCEDURE — 1160F RVW MEDS BY RX/DR IN RCRD: CPT | Performed by: NURSE PRACTITIONER

## 2023-10-23 PROCEDURE — 3079F DIAST BP 80-89 MM HG: CPT | Performed by: NURSE PRACTITIONER

## 2023-10-23 PROCEDURE — 3075F SYST BP GE 130 - 139MM HG: CPT | Performed by: NURSE PRACTITIONER

## 2023-10-23 NOTE — TELEPHONE ENCOUNTER
Pt son in law on the phone , pt was taken to hospital Saturday thought she had a stroke went home that night. Pt is still having trouble getting her words out and she is confused. .      Need pt appointment ? Please Advise     [phone]. Rony Hairston.

## 2023-10-23 NOTE — PROGRESS NOTES
"Chief Complaint  Altered Mental Status (confusion) and Unable to speak (Trouble finding words)  Subjective        Sylvia Stover presents to [facility]  History of Present Illness    Sylvia Stover is an 83-year-old female who presents today for follow-up regarding recent ER visit. She is accompanied by her friend Rony who provides additional history.    She presented to the ER 10/21/23 due to acute onset of memory loss. She was working on her computer and was unable to remember the password. She presented to the ER where her CT head did not show any acute abnl. Her urine showed 1+ bacteria, no culture performed. No labs were completed.    She has had difficulty completing her thoughts and with word finding since then. She denies headaches and/or visual changes. No paresthesias and/or weakness. She states she has had difficulty with writing because she cannot remember how to spell or make sense of the words on the paper. No balance changes.    She has been with Rony throughout the day today and has become frustrated with difficulty completing senses. However, he notes a marked improvement in her symptoms during her visit today.    Objective   Vital Signs:  /86 (BP Location: Left arm, Patient Position: Sitting, Cuff Size: Adult)   Pulse 70   Ht 154.9 cm (61\")   Wt 62.2 kg (137 lb 3.2 oz)   SpO2 100%   BMI 25.92 kg/m²   Estimated body mass index is 25.92 kg/m² as calculated from the following:    Height as of this encounter: 154.9 cm (61\").    Weight as of this encounter: 62.2 kg (137 lb 3.2 oz).            Physical Exam  Constitutional:       General: She is not in acute distress.     Appearance: Normal appearance. She is not diaphoretic.   HENT:      Head: Normocephalic and atraumatic.      Right Ear: Tympanic membrane, ear canal and external ear normal.      Left Ear: Tympanic membrane, ear canal and external ear normal.      Nose: Nose normal. No rhinorrhea.      " "Mouth/Throat:      Mouth: Mucous membranes are moist.      Pharynx: Oropharynx is clear.   Eyes:      General:         Right eye: No discharge.         Left eye: No discharge.      Conjunctiva/sclera: Conjunctivae normal.   Cardiovascular:      Rate and Rhythm: Normal rate and regular rhythm.      Pulses: Normal pulses.      Heart sounds: Normal heart sounds.   Pulmonary:      Effort: Pulmonary effort is normal.      Breath sounds: Normal breath sounds.   Abdominal:      General: Bowel sounds are normal.      Tenderness: There is no abdominal tenderness.   Musculoskeletal:         General: No swelling or tenderness.      Cervical back: Normal range of motion.   Skin:     General: Skin is warm and dry.   Neurological:      General: No focal deficit present.      Mental Status: She is alert and oriented to person, place, and time.      Cranial Nerves: Cranial nerves 2-12 are intact.      Sensory: Sensation is intact.      Motor: Motor function is intact.      Comments: She is able to spell \"hello\" out loud as well as read it on the paper. She has difficulty understanding processes with multiple steps; mixing up order and words when restating plan.   Psychiatric:         Mood and Affect: Mood normal.         Behavior: Behavior normal.         Judgment: Judgment normal.        Result Review :  The following data was reviewed by: JUNE Mohr on 10/23/2023:  Common labs          6/19/2023    14:47 7/7/2023    14:26 10/12/2023    14:28   Common Labs   Glucose 101  102  107    BUN 32  22  27    Creatinine 1.71  1.63  1.62    Sodium 140  135  133    Potassium 5.3  5.3  4.5    Chloride 103  102  99    Calcium 9.8  9.4  10.1    Total Protein 7.4      Albumin 4.8  4.7  4.6    Total Bilirubin 0.4  0.5  0.4    Alkaline Phosphatase 47  48  55    AST (SGOT) 15  16  17    ALT (SGPT) 12  16  15    Total Cholesterol 128      Triglycerides 148      HDL Cholesterol 44      LDL Cholesterol  59        Data reviewed : Radiologic " studies CT head 10/21/23, ER notes 10/21/23               Assessment and Plan   Diagnoses and all orders for this visit:    1. Memory loss (Primary)  Comments:  The patient was advised to begin taking baby aspirin. We will obtain urine, laboratory studies, and MRI of the brain.  Orders:  -     CBC & Differential  -     Comprehensive Metabolic Panel  -     TSH  -     Vitamin B12  -     aspirin 81 MG EC tablet; Take 1 tablet by mouth Daily.  -     MRI Brain With & Without Contrast; Future    2. Word finding difficulty  -     MRI Brain With & Without Contrast; Future    3. Bacteriuria  Assessment & Plan:  1+ bacteria noted in recent urine collection, will repeat UA and send for culture (denies urinary symptoms)    Orders:  -     Urinalysis With Microscopic If Indicated (No Culture) - Urine, Clean Catch  -     Urine Culture - Urine, Urine, Clean Catch; Future    She has had an acute onset of memory loss with difficulty finding words. CT head 10/21/23 did not show any acute abnl, will obtain MRI brain to further evaluate. Will also send for labs (including B12) and repeat urine due to 1+ bacteruria noted in ER. She will start ASA 81 mg daily for now.       Follow Up   No follow-ups on file.  Patient was given instructions and counseling regarding her condition or for health maintenance advice. Please see specific information pulled into the AVS if appropriate.        Transcribed from ambient dictation for JUNE Mohr by Dominga Geiger.  10/23/23   19:44 EDT    Patient or patient representative verbalized consent to the visit recording.  I have personally performed the services described in this document as transcribed by the above individual, and it is both accurate and complete.

## 2023-10-23 NOTE — TELEPHONE ENCOUNTER
UNABLE TO WARM TRANSFER    Caller: Sylvia Stover    Relationship to patient: Self    Best call back number: [phone] SON IN LAW -LISA (PATIENT IS WITH HIM AND CAN GIVE PERMISSION TO SPEAK WITH HIM)    Patient is needing: PATIENT IS CHECKING STATUS. SHE IS NEEDING TO BE WORKED IN WITH PROVIDER. PLEASE CALL

## 2023-10-24 ENCOUNTER — TELEPHONE (OUTPATIENT)
Dept: INTERNAL MEDICINE | Facility: CLINIC | Age: 83
End: 2023-10-24
Payer: MEDICARE

## 2023-10-24 ENCOUNTER — LAB (OUTPATIENT)
Dept: LAB | Facility: HOSPITAL | Age: 83
End: 2023-10-24
Payer: MEDICARE

## 2023-10-24 ENCOUNTER — HOSPITAL ENCOUNTER (OUTPATIENT)
Dept: MRI IMAGING | Facility: HOSPITAL | Age: 83
Discharge: HOME | End: 2023-10-24
Payer: MEDICARE

## 2023-10-24 DIAGNOSIS — R82.71 BACTERIURIA: ICD-10-CM

## 2023-10-24 DIAGNOSIS — R47.89 WORD FINDING DIFFICULTY: ICD-10-CM

## 2023-10-24 DIAGNOSIS — M81.0 OSTEOPOROSIS, SENILE: ICD-10-CM

## 2023-10-24 DIAGNOSIS — R41.3 MEMORY LOSS: ICD-10-CM

## 2023-10-24 LAB
25(OH)D3 SERPL-MCNC: 51.1 NG/ML (ref 30–100)
ALBUMIN SERPL-MCNC: 4.8 G/DL (ref 3.5–5.2)
ALBUMIN/GLOB SERPL: 1.5 G/DL
ALP SERPL-CCNC: 48 U/L (ref 39–117)
ALT SERPL W P-5'-P-CCNC: 17 U/L (ref 1–33)
ANION GAP SERPL CALCULATED.3IONS-SCNC: 14.9 MMOL/L (ref 5–15)
AST SERPL-CCNC: 22 U/L (ref 1–32)
BACTERIA UR QL AUTO: ABNORMAL /HPF
BASOPHILS # BLD AUTO: 0.05 10*3/MM3 (ref 0–0.2)
BASOPHILS NFR BLD AUTO: 0.8 % (ref 0–1.5)
BILIRUB SERPL-MCNC: 0.6 MG/DL (ref 0–1.2)
BILIRUB UR QL STRIP: NEGATIVE
BUN SERPL-MCNC: 31 MG/DL (ref 8–23)
BUN/CREAT SERPL: 19.1 (ref 7–25)
CALCIUM SPEC-SCNC: 9.9 MG/DL (ref 8.6–10.5)
CHLORIDE SERPL-SCNC: 99 MMOL/L (ref 98–107)
CLARITY UR: CLEAR
CO2 SERPL-SCNC: 20.1 MMOL/L (ref 22–29)
COLOR UR: YELLOW
CREAT SERPL-MCNC: 1.62 MG/DL (ref 0.57–1)
DEPRECATED RDW RBC AUTO: 42.7 FL (ref 37–54)
EGFRCR SERPLBLD CKD-EPI 2021: 31.6 ML/MIN/1.73
EOSINOPHIL # BLD AUTO: 0.06 10*3/MM3 (ref 0–0.4)
EOSINOPHIL NFR BLD AUTO: 0.9 % (ref 0.3–6.2)
ERYTHROCYTE [DISTWIDTH] IN BLOOD BY AUTOMATED COUNT: 12.5 % (ref 12.3–15.4)
GLOBULIN UR ELPH-MCNC: 3.3 GM/DL
GLUCOSE SERPL-MCNC: 116 MG/DL (ref 65–99)
GLUCOSE UR STRIP-MCNC: NEGATIVE MG/DL
HCT VFR BLD AUTO: 37.8 % (ref 34–46.6)
HGB BLD-MCNC: 12.7 G/DL (ref 12–15.9)
HGB UR QL STRIP.AUTO: NEGATIVE
HYALINE CASTS UR QL AUTO: ABNORMAL /LPF
IMM GRANULOCYTES # BLD AUTO: 0.04 10*3/MM3 (ref 0–0.05)
IMM GRANULOCYTES NFR BLD AUTO: 0.6 % (ref 0–0.5)
KETONES UR QL STRIP: ABNORMAL
LEUKOCYTE ESTERASE UR QL STRIP.AUTO: ABNORMAL
LYMPHOCYTES # BLD AUTO: 0.73 10*3/MM3 (ref 0.7–3.1)
LYMPHOCYTES NFR BLD AUTO: 11.4 % (ref 19.6–45.3)
MAGNESIUM SERPL-MCNC: 2.4 MG/DL (ref 1.6–2.4)
MCH RBC QN AUTO: 31.4 PG (ref 26.6–33)
MCHC RBC AUTO-ENTMCNC: 33.6 G/DL (ref 31.5–35.7)
MCV RBC AUTO: 93.6 FL (ref 79–97)
MONOCYTES # BLD AUTO: 0.75 10*3/MM3 (ref 0.1–0.9)
MONOCYTES NFR BLD AUTO: 11.7 % (ref 5–12)
NEUTROPHILS NFR BLD AUTO: 4.76 10*3/MM3 (ref 1.7–7)
NEUTROPHILS NFR BLD AUTO: 74.6 % (ref 42.7–76)
NITRITE UR QL STRIP: NEGATIVE
NRBC BLD AUTO-RTO: 0 /100 WBC (ref 0–0.2)
PH UR STRIP.AUTO: 5.5 [PH] (ref 5–8)
PHOSPHATE SERPL-MCNC: 3.2 MG/DL (ref 2.5–4.5)
PLATELET # BLD AUTO: 269 10*3/MM3 (ref 140–450)
PMV BLD AUTO: 9.3 FL (ref 6–12)
POTASSIUM SERPL-SCNC: 5.1 MMOL/L (ref 3.5–5.2)
PROT SERPL-MCNC: 8.1 G/DL (ref 6–8.5)
PROT UR QL STRIP: ABNORMAL
RBC # BLD AUTO: 4.04 10*6/MM3 (ref 3.77–5.28)
RBC # UR STRIP: ABNORMAL /HPF
REF LAB TEST METHOD: ABNORMAL
SODIUM SERPL-SCNC: 134 MMOL/L (ref 136–145)
SP GR UR STRIP: 1.02 (ref 1–1.03)
SQUAMOUS #/AREA URNS HPF: ABNORMAL /HPF
TSH SERPL DL<=0.05 MIU/L-ACNC: 1.25 UIU/ML (ref 0.27–4.2)
UROBILINOGEN UR QL STRIP: ABNORMAL
VIT B12 BLD-MCNC: 830 PG/ML (ref 211–946)
WBC # UR STRIP: ABNORMAL /HPF
WBC NRBC COR # BLD: 6.39 10*3/MM3 (ref 3.4–10.8)

## 2023-10-24 PROCEDURE — A9577 INJ MULTIHANCE: HCPCS | Performed by: NURSE PRACTITIONER

## 2023-10-24 PROCEDURE — 81001 URINALYSIS AUTO W/SCOPE: CPT

## 2023-10-24 PROCEDURE — 0 GADOBENATE DIMEGLUMINE 529 MG/ML SOLUTION: Performed by: NURSE PRACTITIONER

## 2023-10-24 PROCEDURE — 82306 VITAMIN D 25 HYDROXY: CPT

## 2023-10-24 PROCEDURE — 85025 COMPLETE CBC W/AUTO DIFF WBC: CPT | Performed by: NURSE PRACTITIONER

## 2023-10-24 PROCEDURE — 70553 MRI BRAIN STEM W/O & W/DYE: CPT

## 2023-10-24 PROCEDURE — 84443 ASSAY THYROID STIM HORMONE: CPT | Performed by: NURSE PRACTITIONER

## 2023-10-24 PROCEDURE — 84100 ASSAY OF PHOSPHORUS: CPT

## 2023-10-24 PROCEDURE — 82607 VITAMIN B-12: CPT | Performed by: NURSE PRACTITIONER

## 2023-10-24 PROCEDURE — 36415 COLL VENOUS BLD VENIPUNCTURE: CPT | Performed by: NURSE PRACTITIONER

## 2023-10-24 PROCEDURE — 83735 ASSAY OF MAGNESIUM: CPT

## 2023-10-24 PROCEDURE — 87086 URINE CULTURE/COLONY COUNT: CPT

## 2023-10-24 PROCEDURE — 80053 COMPREHEN METABOLIC PANEL: CPT | Performed by: NURSE PRACTITIONER

## 2023-10-24 RX ORDER — ASPIRIN 81 MG/1
81 TABLET ORAL DAILY
Start: 2023-10-24

## 2023-10-24 RX ADMIN — GADOBENATE DIMEGLUMINE 11 ML: 529 INJECTION, SOLUTION INTRAVENOUS at 16:04

## 2023-10-24 NOTE — TELEPHONE ENCOUNTER
I called patient Sylvia Stover today to give her information regarding her Mri test scheduled for today 10/24/2023 Tuesday arrive 2:15 PM at [facility] patients son Anthony Scales answered the phone He would like for Rhoda WATKINS to give him a phone call at [phone]

## 2023-10-24 NOTE — ASSESSMENT & PLAN NOTE
1+ bacteria noted in recent urine collection, will repeat UA and send for culture (denies urinary symptoms)

## 2023-10-24 NOTE — TELEPHONE ENCOUNTER
Radiologist called re: MRI brain which showed an acute left MCA stroke. Discussed with son Nikko, patient advised to report to ER for further evaluation.

## 2023-10-24 NOTE — PROGRESS NOTES
Radiology called re: MRI of brain which showed an acute left MCA stroke. Discussed with son Nikko, patient advised to report to ER for further evaluation.

## 2023-10-25 DIAGNOSIS — I63.511 ACUTE RIGHT MCA STROKE: Primary | ICD-10-CM

## 2023-10-25 LAB — BACTERIA SPEC AEROBE CULT: NORMAL

## 2023-11-09 ENCOUNTER — PATIENT MESSAGE (OUTPATIENT)
Dept: INTERNAL MEDICINE | Facility: CLINIC | Age: 83
End: 2023-11-09
Payer: MEDICARE

## 2023-11-10 ENCOUNTER — TELEPHONE (OUTPATIENT)
Dept: INTERNAL MEDICINE | Facility: CLINIC | Age: 83
End: 2023-11-10
Payer: MEDICARE

## 2023-11-10 NOTE — TELEPHONE ENCOUNTER
Gerardo from [facility] is needing a speech therapy order for     Once  a week for 6 weeks.     Call back [phone].

## 2023-11-13 ENCOUNTER — TELEMEDICINE (OUTPATIENT)
Dept: INTERNAL MEDICINE | Facility: CLINIC | Age: 83
End: 2023-11-13
Payer: MEDICARE

## 2023-11-13 DIAGNOSIS — I10 ESSENTIAL HYPERTENSION: Primary | Chronic | ICD-10-CM

## 2023-11-13 DIAGNOSIS — Z86.73 H/O ISCHEMIC LEFT MCA STROKE: ICD-10-CM

## 2023-11-13 DIAGNOSIS — E78.00 PURE HYPERCHOLESTEROLEMIA: Chronic | ICD-10-CM

## 2023-11-13 PROCEDURE — 1159F MED LIST DOCD IN RCRD: CPT | Performed by: NURSE PRACTITIONER

## 2023-11-13 PROCEDURE — 1160F RVW MEDS BY RX/DR IN RCRD: CPT | Performed by: NURSE PRACTITIONER

## 2023-11-13 PROCEDURE — 99214 OFFICE O/P EST MOD 30 MIN: CPT | Performed by: NURSE PRACTITIONER

## 2023-11-13 RX ORDER — ATORVASTATIN CALCIUM 20 MG/1
20 TABLET, FILM COATED ORAL DAILY
Start: 2023-11-13 | End: 2023-11-17 | Stop reason: SDUPTHER

## 2023-11-17 RX ORDER — ATORVASTATIN CALCIUM 20 MG/1
20 TABLET, FILM COATED ORAL DAILY
Qty: 90 TABLET | Refills: 1 | Status: SHIPPED | OUTPATIENT
Start: 2023-11-17

## 2023-11-17 NOTE — TELEPHONE ENCOUNTER
From: Sylvia Stover  To: Rhoda Luna  Sent: 11/9/2023 9:35 PM EST  Subject: blood pressure    The doctor took my off all my blood pressure medicines while I was In. the hospital. Since I have been home from the home hospital my pressure medicine been going. Tonight it is 195/99 other reading have been 156/99,150/101, 150/05. just doesn't make sense to me that it should be that high.

## 2023-11-26 PROBLEM — Z86.73 H/O ISCHEMIC LEFT MCA STROKE: Chronic | Status: ACTIVE | Noted: 2023-11-26

## 2023-11-26 PROBLEM — R47.89 WORD FINDING DIFFICULTY: Status: RESOLVED | Noted: 2023-10-24 | Resolved: 2023-11-26

## 2023-11-26 PROBLEM — Z86.73 H/O ISCHEMIC LEFT MCA STROKE: Status: ACTIVE | Noted: 2023-11-26

## 2023-11-26 PROBLEM — R41.3 MEMORY LOSS: Status: RESOLVED | Noted: 2023-10-24 | Resolved: 2023-11-26

## 2023-11-26 RX ORDER — METOPROLOL TARTRATE 50 MG/1
50 TABLET, FILM COATED ORAL 2 TIMES DAILY
COMMUNITY

## 2023-11-26 NOTE — PROGRESS NOTES
"Chief Complaint  Hypertension and Stroke    Subjective        Sylvia Stover presents to [facility] for f/u regarding stroke and HTN.    She was hospitalized at [facility] 10/24 for an acute left MCA infarct. Her CTA head and neck 10/24/23 showed an occlusion of the very distal left M4 segment of the middle cerebral artery. Her TTE 10/24/23 showed EF 58%. Her MARIBEL 10/26/23 showed mild LAE but no thrombus or PFO. A loop recorder was placed on 10/27/23.    She was discharged to [facility] rehab and has been discharged to home. Her BP has been mildly elevated, medications discharged during hospitalization. She continues with home health and speech therapy. Report feeling well, continues to experience word finding difficulty.    You have chosen to receive care through a telehealth visit.  Do you consent to use a video/audio connection for your medical care today? Yes    Location of patient: home  Location of provider: [facility]  The visit included audio and video interaction between patient and provider due to the COVID-19 pandemic.    History of Present Illness    Objective   Vital Signs:  There were no vitals taken for this visit.  Estimated body mass index is 25.92 kg/m² as calculated from the following:    Height as of 10/23/23: 154.9 cm (61\").    Weight as of 10/23/23: 62.2 kg (137 lb 3.2 oz).       BMI is within normal parameters. No other follow-up for BMI required.      Physical Exam  Constitutional:       Appearance: She is well-developed.   HENT:      Head: Normocephalic and atraumatic.   Eyes:      General:         Right eye: No discharge.         Left eye: No discharge.      Conjunctiva/sclera: Conjunctivae normal.   Pulmonary:      Effort: Pulmonary effort is normal.   Neurological:      Mental Status: She is alert and oriented to person, place, and time.   Psychiatric:         Behavior: Behavior normal.         Thought Content: Thought content normal.         Judgment: Judgment " normal.        Result Review :  The following data was reviewed by: JUNE Mohr on 11/13/2023:  Common labs          7/7/2023    14:26 10/12/2023    14:28 10/24/2023    14:30   Common Labs   Glucose 102  107  116    BUN 22  27  31    Creatinine 1.63  1.62  1.62    Sodium 135  133  134    Potassium 5.3  4.5  5.1    Chloride 102  99  99    Calcium 9.4  10.1  9.9    Albumin 4.7  4.6  4.8    Total Bilirubin 0.5  0.4  0.6    Alkaline Phosphatase 48  55  48    AST (SGOT) 16  17  22    ALT (SGPT) 16  15  17    WBC   6.39    Hemoglobin   12.7    Hematocrit   37.8    Platelets   269      Data reviewed : Recent hospitalization notes 10/24/23             Assessment and Plan   Diagnoses and all orders for this visit:    1. Essential hypertension (Primary)  Assessment & Plan:  Home BP readings have been elevated, medications discontinued during recent admission. She has recently restarted lisinopril 10 mg daily and continue metoprolol daily with close BP monitoring. Call if readings are consistently >140/90.      2. H/O ischemic left MCA stroke  Assessment & Plan:  She was hospitalized at [facility] 10/24 for an acute left MCA infarct. Her CTA head and neck 10/24/23 showed an occlusion of the very distal left M4 segment of the middle cerebral artery. Her TTE 10/24/23 showed EF 58%. Her MARIBEL 10/26/23 showed mild LAE but no thrombus or PFO. A loop recorder was placed on 10/27/23.  She is managed on atorvastatin and asa daily.      3. Pure hypercholesterolemia  Assessment & Plan:  She has changed to atorvastatin 20 mg (previously on Simvastatin 20 mg) daily which she is tolerating well, recheck lipid panel with next labs.      Other orders  -     Discontinue: atorvastatin (LIPITOR) 20 MG tablet; Take 1 tablet by mouth Daily.      History and medical judgement obtained from video conversation with patient. No hands-on physical examination was performed. Approximately 20 minutes spent in video conversation with patient and in  chart review.         Follow Up   No follow-ups on file.  Patient was given instructions and counseling regarding her condition or for health maintenance advice. Please see specific information pulled into the AVS if appropriate.

## 2023-11-26 NOTE — ASSESSMENT & PLAN NOTE
Home BP readings have been elevated, medications discontinued during recent admission. She has recently restarted lisinopril 10 mg daily and continue metoprolol daily with close BP monitoring. Call if readings are consistently >140/90.

## 2023-11-26 NOTE — ASSESSMENT & PLAN NOTE
She was hospitalized at [facility] 10/24 for an acute left MCA infarct. Her CTA head and neck 10/24/23 showed an occlusion of the very distal left M4 segment of the middle cerebral artery. Her TTE 10/24/23 showed EF 58%. Her MARIBEL 10/26/23 showed mild LAE but no thrombus or PFO. A loop recorder was placed on 10/27/23.  She is managed on atorvastatin and asa daily.

## 2023-11-26 NOTE — ASSESSMENT & PLAN NOTE
She has changed to atorvastatin 20 mg (previously on Simvastatin 20 mg) daily which she is tolerating well, recheck lipid panel with next labs.

## 2023-12-07 ENCOUNTER — PATIENT MESSAGE (OUTPATIENT)
Dept: INTERNAL MEDICINE | Facility: CLINIC | Age: 83
End: 2023-12-07
Payer: MEDICARE

## 2023-12-07 NOTE — TELEPHONE ENCOUNTER
From: Sylvia Stover  To: Rhoda Luna  Sent: 12/7/2023 11:22 AM EST  Subject: extra Eloquis    I am concerned about whether I will be running out of medication for my Eloquid 2.5 mg (two times per day) medication. I have requested a refill but they will not process until 12/16, and I have enough until 12/20. That is cutting it pretty fine. Could you send me a partial prescription to the [facility] pharmacy at . Thank you.

## 2023-12-09 DIAGNOSIS — E03.9 ACQUIRED HYPOTHYROIDISM: ICD-10-CM

## 2023-12-11 RX ORDER — LEVOTHYROXINE SODIUM 88 UG/1
88 TABLET ORAL DAILY
Qty: 90 TABLET | Refills: 1 | Status: SHIPPED | OUTPATIENT
Start: 2023-12-11

## 2023-12-20 ENCOUNTER — TELEMEDICINE (OUTPATIENT)
Dept: INTERNAL MEDICINE | Facility: CLINIC | Age: 83
End: 2023-12-20
Payer: MEDICARE

## 2023-12-20 DIAGNOSIS — E78.00 PURE HYPERCHOLESTEROLEMIA: Chronic | ICD-10-CM

## 2023-12-20 DIAGNOSIS — Z00.00 MEDICARE ANNUAL WELLNESS VISIT, SUBSEQUENT: Primary | ICD-10-CM

## 2023-12-20 DIAGNOSIS — I10 ESSENTIAL HYPERTENSION: Chronic | ICD-10-CM

## 2023-12-20 DIAGNOSIS — E03.9 ACQUIRED HYPOTHYROIDISM: Chronic | ICD-10-CM

## 2023-12-20 DIAGNOSIS — Z86.73 H/O ISCHEMIC LEFT MCA STROKE: Chronic | ICD-10-CM

## 2023-12-20 PROCEDURE — 1159F MED LIST DOCD IN RCRD: CPT | Performed by: NURSE PRACTITIONER

## 2023-12-20 PROCEDURE — G0439 PPPS, SUBSEQ VISIT: HCPCS | Performed by: NURSE PRACTITIONER

## 2023-12-20 PROCEDURE — 1160F RVW MEDS BY RX/DR IN RCRD: CPT | Performed by: NURSE PRACTITIONER

## 2023-12-20 RX ORDER — AMLODIPINE BESYLATE 10 MG/1
10 TABLET ORAL DAILY
COMMUNITY
Start: 2023-10-30

## 2023-12-20 RX ORDER — MELATONIN
1000 DAILY
COMMUNITY
Start: 2023-10-25

## 2023-12-31 PROBLEM — I48.0 PAROXYSMAL ATRIAL FIBRILLATION: Status: ACTIVE | Noted: 2021-02-23

## 2023-12-31 PROBLEM — I48.0 PAROXYSMAL ATRIAL FIBRILLATION: Chronic | Status: ACTIVE | Noted: 2021-02-23

## 2023-12-31 NOTE — ASSESSMENT & PLAN NOTE
She was hospitalized at [facility] 10/24 for an acute left MCA infarct. Her CTA head and neck 10/24/23 showed an occlusion of the very distal left M4 segment of the middle cerebral artery. Her TTE 10/24/23 showed EF 58%. Her MARIBEL 10/26/23 showed mild LAE but no thrombus or PFO. A loop recorder was placed on 10/27/23-episodes of atrial fibrillation noted.  She is working with speech therapy with gradual improvement in speech.

## 2023-12-31 NOTE — ASSESSMENT & PLAN NOTE
Atorvastatin 20 mg started during hospitalization 10/2023 for acute stroke (previously on simvastatin 20 mg). Recheck lipid panel with next labs.

## 2023-12-31 NOTE — PROGRESS NOTES
The ABCs of the Annual Wellness Visit  Subsequent Medicare Wellness Visit    Subjective      Sylvia Stover is a 83 y.o. female who presents for a Subsequent Medicare Wellness Visit.    You have chosen to receive care through a telehealth visit.  Do you consent to use a video/audio connection for your medical care today? Yes    Location of patient: home  Location of provider[place] [facility]  The visit included audio and video interaction between patient and provider due to the COVID-19 pandemic.    The following portions of the patient's history were reviewed and   updated as appropriate: allergies, current medications, past family history, past medical history, past social history, past surgical history, and problem list.    Compared to one year ago, the patient feels her physical   health is worse. (Overall doing well but recovering from recent stroke)    Compared to one year ago, the patient feels her mental   health is the same.    Recent Hospitalizations:  She was not admitted to the hospital during the last year.       Current Medical Providers:  Patient Care Team:  Rhoda Luna APRN as PCP - General (Internal Medicine)  Jadiel Spence MD (Inactive) as Consulting Physician (Nephrology)    Outpatient Medications Prior to Visit   Medication Sig Dispense Refill    amLODIPine (NORVASC) 10 MG tablet Take 1 tablet by mouth Daily.      apixaban (ELIQUIS) 2.5 MG tablet tablet Take 1 tablet by mouth Every 12 (Twelve) Hours. 14 tablet 0    atorvastatin (LIPITOR) 20 MG tablet Take 1 tablet by mouth Daily. 90 tablet 1    budesonide (PULMICORT) 90 MCG/ACT inhaler Inhale 1 puff 2 (Two) Times a Day As Needed (shortness of breath). Rinse and spit after use 3 each 1    cholecalciferol (Vitamin D) 25 MCG (1000 UT) tablet Take 1 tablet by mouth Every Other Day. 30 tablet 5    Cholecalciferol 25 MCG (1000 UT) tablet Take 1 tablet by mouth Daily.      levothyroxine (SYNTHROID, LEVOTHROID) 88 MCG tablet TAKE 1 TABLET DAILY  "90 tablet 1    lisinopril (PRINIVIL,ZESTRIL) 20 MG tablet Take 1 tablet by mouth Daily. 90 tablet 1    aspirin 81 MG EC tablet Take 1 tablet by mouth Daily.      metoprolol tartrate (LOPRESSOR) 50 MG tablet Take 1 tablet by mouth 2 (Two) Times a Day.       No facility-administered medications prior to visit.       No opioid medication identified on active medication list. I have reviewed chart for other potential  high risk medication/s and harmful drug interactions in the elderly.        Aspirin is not on active medication list.  Aspirin use is not indicated based on review of current medical condition/s. Risk of harm outweighs potential benefits.  .    Patient Active Problem List   Diagnosis    Acquired hypothyroidism    Essential hypertension    Hyperlipidemia    Stage 3 chronic kidney disease    Osteoarthritis of right knee    Osteoporosis, senile    APC (atrial premature contractions)    Carpal tunnel syndrome    Mobitz type 2 second degree AV block    Paroxysmal atrial fibrillation    Bronchospasm    Great toe pain, left    Gastroesophageal reflux disease without esophagitis    Bacteriuria    H/O ischemic left MCA stroke     Advance Care Planning   Advance Care Planning     Advance Directive is not on file.  ACP discussion was held with the patient during this visit. Patient has an advance directive (not in EMR), copy requested.     Objective    Vitals:    12/20/23 1408   PainSc: 0-No pain     Estimated body mass index is 25.92 kg/m² as calculated from the following:    Height as of 10/23/23: 154.9 cm (61\").    Weight as of 10/23/23: 62.2 kg (137 lb 3.2 oz).           Does the patient have evidence of cognitive impairment?   No            HEALTH RISK ASSESSMENT    Smoking Status:  Social History     Tobacco Use   Smoking Status Former    Packs/day: 0.25    Years: 0.00    Additional pack years: 0.00    Total pack years: 0.00    Types: Cigarettes    Start date: 1/1/1961    Quit date: 1/1/2007    Years since " quittin.0   Smokeless Tobacco Never   Tobacco Comments    My tobacco use varied from year to year.  When I quit I was smoking three cigarettes a week.     Alcohol Consumption:  Social History     Substance and Sexual Activity   Alcohol Use No    Comment: caffeine1 cup daily     Fall Risk Screen:    KYLAH Fall Risk Assessment was completed, and patient is at LOW risk for falls.Assessment completed on:2023    Depression Screenin/20/2023     2:12 PM   PHQ-2/PHQ-9 Depression Screening   Little Interest or Pleasure in Doing Things 0-->not at all   Feeling Down, Depressed or Hopeless 0-->not at all   PHQ-9: Brief Depression Severity Measure Score 0       Health Habits and Functional and Cognitive Screenin/20/2023     2:12 PM   Functional & Cognitive Status   Do you have difficulty preparing food and eating? No   Do you have difficulty bathing yourself, getting dressed or grooming yourself? No   Do you have difficulty using the toilet? No   Do you have difficulty moving around from place to place? No   Do you have trouble with steps or getting out of a bed or a chair? No   Current Diet Well Balanced Diet   Dental Exam Up to date   Eye Exam Not up to date   Exercise (times per week) 5 times per week   Current Exercises Include Walking   Do you need help using the phone?  No   Are you deaf or do you have serious difficulty hearing?  No   Do you need help to go to places out of walking distance? No   Do you need help shopping? No   Do you need help preparing meals?  No   Do you need help with housework?  No   Do you need help with laundry? No   Do you need help taking your medications? No   Do you need help managing money? No   Do you ever drive or ride in a car without wearing a seat belt? No   Have you felt unusual stress, anger or loneliness in the last month? No   Who do you live with? Alone   If you need help, do you have trouble finding someone available to you? No   Have you been  bothered in the last four weeks by sexual problems? No   Do you have difficulty concentrating, remembering or making decisions? No       Age-appropriate Screening Schedule:  Refer to the list below for future screening recommendations based on patient's age, sex and/or medical conditions. Orders for these recommended tests are listed in the plan section. The patient has been provided with a written plan.    Health Maintenance   Topic Date Due    ANNUAL WELLNESS VISIT  12/16/2023    LIPID PANEL  06/19/2024    BMI FOLLOWUP  06/19/2024    DXA SCAN  08/02/2024    COLORECTAL CANCER SCREENING  05/12/2026    TDAP/TD VACCINES (4 - Td or Tdap) 08/16/2030    COVID-19 Vaccine  Completed    INFLUENZA VACCINE  Completed    Pneumococcal Vaccine 65+  Completed    ZOSTER VACCINE  Completed                  CMS Preventative Services Quick Reference  Risk Factors Identified During Encounter:    Fall Risk-High or Moderate: Discussed Fall Prevention in the home    The above risks/problems have been discussed with the patient.  Pertinent information has been shared with the patient in the After Visit Summary.    Diagnoses and all orders for this visit:    1. Medicare annual wellness visit, subsequent (Primary)    2. Pure hypercholesterolemia  Assessment & Plan:  Atorvastatin 20 mg started during hospitalization 10/2023 for acute stroke (previously on simvastatin 20 mg). Recheck lipid panel with next labs.      3. Essential hypertension  Assessment & Plan:  BP has remained stable with restarting lisinopril which she will continue along with a low sodium diet.      4. Acquired hypothyroidism  Assessment & Plan:  Continue Synthroid 88 mcg daily for thyroid replacement, recheck TSH with next labs.      5. H/O ischemic left MCA stroke  Assessment & Plan:  She was hospitalized at [facility] 10/24 for an acute left MCA infarct. Her CTA head and neck 10/24/23 showed an occlusion of the very distal left M4 segment of the middle cerebral artery. Her  TTE 10/24/23 showed EF 58%. Her MARIBEL 10/26/23 showed mild LAE but no thrombus or PFO. A loop recorder was placed on 10/27/23-episodes of atrial fibrillation noted.  She is working with speech therapy with gradual improvement in speech.          Follow Up:   Next Medicare Wellness visit to be scheduled in 1 year.      An After Visit Summary and PPPS were made available to the patient.    History and medical judgement obtained from video conversation with patient. No hands-on physical examination was performed. Approximately 25 minutes spent in video conversation with patient and in chart review.

## 2023-12-31 NOTE — ASSESSMENT & PLAN NOTE
11/21/2023-noted on loop recorder placed after CVA, Eliquis started. Denies palpitations. HR maintains in low range.

## 2023-12-31 NOTE — ASSESSMENT & PLAN NOTE
BP has remained stable with restarting lisinopril which she will continue along with a low sodium diet.

## 2023-12-31 NOTE — PROGRESS NOTES
"Chief Complaint  Medicare Wellness-subsequent    Subjective    {Problem List  Visit Diagnosis   Encounters  Notes  Medications  Labs  Result Review Imaging  Media :23}    Sylvia Stover presents to [facility]         History of Present Illness    Objective   Vital Signs:  There were no vitals taken for this visit.  Estimated body mass index is 25.92 kg/m² as calculated from the following:    Height as of 10/23/23: 154.9 cm (61\").    Weight as of 10/23/23: 62.2 kg (137 lb 3.2 oz).       BMI is within normal parameters. No other follow-up for BMI required.      Physical Exam   Result Review :{Labs  Result Review  Imaging  Med Tab  Media  Procedures :23}  {The following data was reviewed by (Optional):91092}  {Ambulatory Labs (Optional):89917}  {Data reviewed (Optional):17541:::1}             Assessment and Plan {CC Problem List  Visit Diagnosis   ROS  Review (Popup)  [facility] Maintenance  Quality  BestPractice  Medications  SmartSets  SnapShot Encounters  Media :23}  Diagnoses and all orders for this visit:    1. Medicare annual wellness visit, subsequent (Primary)    2. Pure hypercholesterolemia  Assessment & Plan:  Atorvastatin 20 mg started during hospitalization 10/2023 for acute stroke (previously on simvastatin 20 mg). Recheck lipid panel with next labs.      3. Essential hypertension  Assessment & Plan:  BP has remained stable with restarting lisinopril which she will continue along with a low sodium diet.      4. Acquired hypothyroidism  Assessment & Plan:  Continue Synthroid 88 mcg daily for thyroid replacement, recheck TSH with next labs.      5. H/O ischemic left MCA stroke  Assessment & Plan:  She was hospitalized at [facility] 10/24 for an acute left MCA infarct. Her CTA head and neck 10/24/23 showed an occlusion of the very distal left M4 segment of the middle cerebral artery. Her TTE 10/24/23 showed EF 58%. Her MARIBEL 10/26/23 showed mild LAE but no thrombus " or PFO. A loop recorder was placed on 10/27/23-episodes of atrial fibrillation noted.  She is working with speech therapy with gradual improvement in speech.        History and medical judgement obtained from video conversation with patient. No hands-on physical examination was performed. Approximately  minutes spent in video conversation with patient and in chart review.       {Time Spent (Optional):55372}  Follow Up {Instructions Charge Capture  Follow-up Communications :23}  No follow-ups on file.  Patient was given instructions and counseling regarding her condition or for health maintenance advice. Please see specific information pulled into the AVS if appropriate.

## 2023-12-31 NOTE — PATIENT INSTRUCTIONS
Medicare Wellness  Personal Prevention Plan of Service     Date of Office Visit:    Encounter Provider:  JUNE Mohr  Place of Service:  [facility]  Patient Name: Sylvia Stover  :  1940    As part of the Medicare Wellness portion of your visit today, we are providing you with this personalized preventive plan of services (PPPS). This plan is based upon recommendations of the [place] Preventive Services Task Force (USPSTF) and the Advisory Committee on Immunization Practices (ACIP).    This lists the preventive care services that should be considered, and provides dates of when you are due. Items listed as completed are up-to-date and do not require any further intervention.    Health Maintenance   Topic Date Due    ANNUAL WELLNESS VISIT  2023    LIPID PANEL  2024    BMI FOLLOWUP  2024    DXA SCAN  2024    COLORECTAL CANCER SCREENING  2026    TDAP/TD VACCINES (4 - Td or Tdap) 2030    COVID-19 Vaccine  Completed    INFLUENZA VACCINE  Completed    Pneumococcal Vaccine 65+  Completed    ZOSTER VACCINE  Completed       No orders of the defined types were placed in this encounter.      No follow-ups on file.

## 2024-01-23 RX ORDER — AMLODIPINE BESYLATE 10 MG/1
10 TABLET ORAL DAILY
Qty: 30 TABLET | Refills: 2 | Status: SHIPPED | OUTPATIENT
Start: 2024-01-23 | End: 2024-01-24 | Stop reason: SDUPTHER

## 2024-01-24 RX ORDER — AMLODIPINE BESYLATE 10 MG/1
10 TABLET ORAL DAILY
Qty: 90 TABLET | Refills: 1 | Status: SHIPPED | OUTPATIENT
Start: 2024-01-24

## 2024-01-24 NOTE — TELEPHONE ENCOUNTER
Rx Refill Note  Requested Prescriptions     Pending Prescriptions Disp Refills    amLODIPine (NORVASC) 10 MG tablet 90 tablet 0     Sig: Take 1 tablet by mouth Daily.      Last office visit with prescribing clinician: 10/23/2023   Last telemedicine visit with prescribing clinician: 12/20/2023   Next office visit with prescribing clinician: 6/21/2024

## 2024-01-30 ENCOUNTER — PATIENT MESSAGE (OUTPATIENT)
Dept: INTERNAL MEDICINE | Facility: CLINIC | Age: 84
End: 2024-01-30
Payer: MEDICARE

## 2024-01-30 DIAGNOSIS — I10 ESSENTIAL HYPERTENSION: Chronic | ICD-10-CM

## 2024-01-31 RX ORDER — LISINOPRIL 20 MG/1
20 TABLET ORAL DAILY
Qty: 90 TABLET | Refills: 1 | Status: SHIPPED | OUTPATIENT
Start: 2024-01-31

## 2024-01-31 NOTE — TELEPHONE ENCOUNTER
From: Sylvia Stover  To: Rhoda Luna  Sent: 1/30/2024 7:50 PM EST  Subject: REFILL    Please send a refill request for Lisinopril   tab 20 mg to [facility] for a 90-day supply Please do not send to the [facility].

## 2024-03-14 RX ORDER — ATORVASTATIN CALCIUM 20 MG/1
20 TABLET, FILM COATED ORAL DAILY
Qty: 90 TABLET | Refills: 1 | Status: SHIPPED | OUTPATIENT
Start: 2024-03-14

## 2024-05-06 ENCOUNTER — TELEPHONE (OUTPATIENT)
Dept: INTERNAL MEDICINE | Facility: CLINIC | Age: 84
End: 2024-05-06
Payer: MEDICARE

## 2024-05-07 ENCOUNTER — HOSPITAL ENCOUNTER (OUTPATIENT)
Dept: INFUSION THERAPY | Facility: HOSPITAL | Age: 84
Discharge: HOME | End: 2024-05-07
Admitting: NURSE PRACTITIONER
Payer: MEDICARE

## 2024-05-07 VITALS
DIASTOLIC BLOOD PRESSURE: 70 MMHG | SYSTOLIC BLOOD PRESSURE: 146 MMHG | OXYGEN SATURATION: 97 % | HEART RATE: 67 BPM | TEMPERATURE: 97.6 F | RESPIRATION RATE: 18 BRPM

## 2024-05-07 DIAGNOSIS — M81.0 OSTEOPOROSIS, SENILE: Primary | Chronic | ICD-10-CM

## 2024-05-07 DIAGNOSIS — H91.93 BILATERAL HEARING LOSS, UNSPECIFIED HEARING LOSS TYPE: Primary | ICD-10-CM

## 2024-05-07 LAB
CALCIUM SPEC-SCNC: 9.4 MG/DL (ref 8.6–10.5)
CREAT SERPL-MCNC: 1.61 MG/DL (ref 0.57–1)
EGFRCR SERPLBLD CKD-EPI 2021: 31.8 ML/MIN/1.73

## 2024-05-07 PROCEDURE — 36415 COLL VENOUS BLD VENIPUNCTURE: CPT

## 2024-05-07 PROCEDURE — 82310 ASSAY OF CALCIUM: CPT | Performed by: NURSE PRACTITIONER

## 2024-05-07 PROCEDURE — 82565 ASSAY OF CREATININE: CPT | Performed by: NURSE PRACTITIONER

## 2024-05-07 PROCEDURE — 96372 THER/PROPH/DIAG INJ SC/IM: CPT

## 2024-05-07 PROCEDURE — 25010000002 DENOSUMAB 60 MG/ML SOLUTION PREFILLED SYRINGE: Performed by: NURSE PRACTITIONER

## 2024-05-07 RX ADMIN — DENOSUMAB 60 MG: 60 INJECTION SUBCUTANEOUS at 12:47

## 2024-06-04 DIAGNOSIS — E03.9 ACQUIRED HYPOTHYROIDISM: ICD-10-CM

## 2024-06-04 RX ORDER — LEVOTHYROXINE SODIUM 88 UG/1
88 TABLET ORAL DAILY
Qty: 90 TABLET | Refills: 1 | Status: SHIPPED | OUTPATIENT
Start: 2024-06-04

## 2024-06-21 ENCOUNTER — OFFICE VISIT (OUTPATIENT)
Dept: INTERNAL MEDICINE | Facility: CLINIC | Age: 84
End: 2024-06-21
Payer: MEDICARE

## 2024-06-21 VITALS
OXYGEN SATURATION: 99 % | HEIGHT: 61 IN | SYSTOLIC BLOOD PRESSURE: 146 MMHG | TEMPERATURE: 97.1 F | WEIGHT: 133.4 LBS | HEART RATE: 62 BPM | BODY MASS INDEX: 25.19 KG/M2 | DIASTOLIC BLOOD PRESSURE: 72 MMHG

## 2024-06-21 DIAGNOSIS — J98.01 BRONCHOSPASM: ICD-10-CM

## 2024-06-21 DIAGNOSIS — I10 ESSENTIAL HYPERTENSION: Primary | Chronic | ICD-10-CM

## 2024-06-21 DIAGNOSIS — M81.0 OSTEOPOROSIS, SENILE: Chronic | ICD-10-CM

## 2024-06-21 DIAGNOSIS — Z78.0 POSTMENOPAUSAL: ICD-10-CM

## 2024-06-21 DIAGNOSIS — I48.0 PAROXYSMAL ATRIAL FIBRILLATION: Chronic | ICD-10-CM

## 2024-06-21 DIAGNOSIS — N18.32 STAGE 3B CHRONIC KIDNEY DISEASE: Chronic | ICD-10-CM

## 2024-06-21 DIAGNOSIS — E78.00 PURE HYPERCHOLESTEROLEMIA: Chronic | ICD-10-CM

## 2024-06-21 DIAGNOSIS — E03.9 ACQUIRED HYPOTHYROIDISM: Chronic | ICD-10-CM

## 2024-06-21 PROCEDURE — 1126F AMNT PAIN NOTED NONE PRSNT: CPT | Performed by: NURSE PRACTITIONER

## 2024-06-21 PROCEDURE — G2211 COMPLEX E/M VISIT ADD ON: HCPCS | Performed by: NURSE PRACTITIONER

## 2024-06-21 PROCEDURE — 1159F MED LIST DOCD IN RCRD: CPT | Performed by: NURSE PRACTITIONER

## 2024-06-21 PROCEDURE — 1160F RVW MEDS BY RX/DR IN RCRD: CPT | Performed by: NURSE PRACTITIONER

## 2024-06-21 PROCEDURE — 3077F SYST BP >= 140 MM HG: CPT | Performed by: NURSE PRACTITIONER

## 2024-06-21 PROCEDURE — 99214 OFFICE O/P EST MOD 30 MIN: CPT | Performed by: NURSE PRACTITIONER

## 2024-06-21 PROCEDURE — 3078F DIAST BP <80 MM HG: CPT | Performed by: NURSE PRACTITIONER

## 2024-06-21 RX ORDER — AMOXICILLIN 500 MG/1
CAPSULE ORAL
COMMUNITY
Start: 2024-05-15

## 2024-06-22 PROBLEM — R82.71 BACTERIURIA: Status: RESOLVED | Noted: 2023-10-24 | Resolved: 2024-06-22

## 2024-06-22 PROBLEM — M79.675 GREAT TOE PAIN, LEFT: Status: RESOLVED | Noted: 2021-08-16 | Resolved: 2024-06-22

## 2024-06-22 LAB
25(OH)D3+25(OH)D2 SERPL-MCNC: 42.3 NG/ML (ref 30–100)
ALBUMIN SERPL-MCNC: 4.7 G/DL (ref 3.5–5.2)
ALBUMIN/GLOB SERPL: 1.7 G/DL
ALP SERPL-CCNC: 52 U/L (ref 39–117)
ALT SERPL-CCNC: 16 U/L (ref 1–33)
AST SERPL-CCNC: 16 U/L (ref 1–32)
BILIRUB SERPL-MCNC: 0.6 MG/DL (ref 0–1.2)
BUN SERPL-MCNC: 23 MG/DL (ref 8–23)
BUN/CREAT SERPL: 15.9 (ref 7–25)
CALCIUM SERPL-MCNC: 9.6 MG/DL (ref 8.6–10.5)
CHLORIDE SERPL-SCNC: 102 MMOL/L (ref 98–107)
CHOLEST SERPL-MCNC: 118 MG/DL (ref 0–200)
CO2 SERPL-SCNC: 21.1 MMOL/L (ref 22–29)
CREAT SERPL-MCNC: 1.45 MG/DL (ref 0.57–1)
EGFRCR SERPLBLD CKD-EPI 2021: 35.9 ML/MIN/1.73
ERYTHROCYTE [DISTWIDTH] IN BLOOD BY AUTOMATED COUNT: 12.9 % (ref 12.3–15.4)
GLOBULIN SER CALC-MCNC: 2.7 GM/DL
GLUCOSE SERPL-MCNC: 98 MG/DL (ref 65–99)
HCT VFR BLD AUTO: 37.8 % (ref 34–46.6)
HDLC SERPL-MCNC: 46 MG/DL (ref 40–60)
HGB BLD-MCNC: 12.6 G/DL (ref 12–15.9)
LDLC SERPL CALC-MCNC: 53 MG/DL (ref 0–100)
MCH RBC QN AUTO: 31.3 PG (ref 26.6–33)
MCHC RBC AUTO-ENTMCNC: 33.3 G/DL (ref 31.5–35.7)
MCV RBC AUTO: 93.8 FL (ref 79–97)
PLATELET # BLD AUTO: 243 10*3/MM3 (ref 140–450)
POTASSIUM SERPL-SCNC: 5.7 MMOL/L (ref 3.5–5.2)
PROT SERPL-MCNC: 7.4 G/DL (ref 6–8.5)
RBC # BLD AUTO: 4.03 10*6/MM3 (ref 3.77–5.28)
SODIUM SERPL-SCNC: 136 MMOL/L (ref 136–145)
TRIGL SERPL-MCNC: 100 MG/DL (ref 0–150)
TSH SERPL DL<=0.005 MIU/L-ACNC: 0.34 UIU/ML (ref 0.27–4.2)
VLDLC SERPL CALC-MCNC: 19 MG/DL (ref 5–40)
WBC # BLD AUTO: 5.18 10*3/MM3 (ref 3.4–10.8)

## 2024-06-22 NOTE — ASSESSMENT & PLAN NOTE
She is receiving Prolia injections through [facility], repeat DEXA scan.  She would like to have this performed in [place].  Continue daily vitamin D supplement.

## 2024-06-22 NOTE — PROGRESS NOTES
"Chief Complaint  Hypertension (6 month follow up)  Subjective        Sylvia Stover presents to [facility]  History of Present Illness  History of Present Illness  The patient is an 84-year-old female who presents for evaluation of multiple medical concerns.    She has continued to monitor her BP bid since her CVA 10/2023 and has brought those readings. Her am readings are typically in the 140s/80s which improve to 120s/80s in the evening. She is currently on amlodipine and lisinopril daily (medications adjusted during hospitalization).    She reports feeling well and has recovered very well from her CVA 10/2023, notes infrequent episodes of expressive aphasia which is not noticeable on exam today. She continues to live independently.    She denies experiencing irregular heartbeats, headaches, dizziness, pain, shortness of breath, heartburn, or indigestion. Her bowel movements are regular and her sleep patterns are normal.    She receives her Prolia injections at [facility] which she is tolerating well.    Objective   Vital Signs:  /72   Pulse 62   Temp 97.1 °F (36.2 °C)   Ht 154.9 cm (61\")   Wt 60.5 kg (133 lb 6.4 oz)   SpO2 99%   BMI 25.21 kg/m²   Estimated body mass index is 25.21 kg/m² as calculated from the following:    Height as of this encounter: 154.9 cm (61\").    Weight as of this encounter: 60.5 kg (133 lb 6.4 oz).            Physical Exam  Constitutional:       Appearance: She is well-developed. She is not ill-appearing.   HENT:      Head: Normocephalic.      Right Ear: Hearing, tympanic membrane and external ear normal.      Left Ear: Hearing, tympanic membrane and external ear normal.      Nose: Nose normal. No nasal deformity, mucosal edema or rhinorrhea.      Right Sinus: No maxillary sinus tenderness or frontal sinus tenderness.      Left Sinus: No maxillary sinus tenderness or frontal sinus tenderness.      Mouth/Throat:      Dentition: Normal " dentition.      Pharynx: Posterior oropharyngeal erythema present.   Eyes:      General: Lids are normal.         Right eye: No discharge.         Left eye: No discharge.      Conjunctiva/sclera: Conjunctivae normal.      Right eye: No exudate.     Left eye: No exudate.  Neck:      Thyroid: No thyroid mass or thyromegaly.      Vascular: No carotid bruit.      Trachea: Trachea normal.   Cardiovascular:      Rate and Rhythm: Regular rhythm.      Pulses: Normal pulses.      Heart sounds: Normal heart sounds. No murmur heard.  Pulmonary:      Effort: No respiratory distress.      Breath sounds: Normal breath sounds. No decreased breath sounds, wheezing, rhonchi or rales.   Abdominal:      General: Bowel sounds are normal.      Palpations: Abdomen is soft.      Tenderness: There is no abdominal tenderness.   Musculoskeletal:      Cervical back: Normal range of motion. No edema.   Lymphadenopathy:      Head:      Right side of head: No submental, submandibular, tonsillar, preauricular, posterior auricular or occipital adenopathy.      Left side of head: No submental, submandibular, tonsillar, preauricular, posterior auricular or occipital adenopathy.   Skin:     General: Skin is warm and dry.      Nails: There is no clubbing.   Neurological:      Mental Status: She is alert.   Psychiatric:         Behavior: Behavior is cooperative.        Physical Exam  Clear lungs.  Mild swelling noted in the musculoskeletal system.    Result Review :  The following data was reviewed by: JUNE Mohr on 06/21/2024:  Common labs          10/24/2023    14:30 5/7/2024    12:10 6/21/2024    14:51   Common Labs   Glucose 116   98    BUN 31   23    Creatinine 1.62  1.61  1.45    Sodium 134   136    Potassium 5.1   5.7    Chloride 99   102    Calcium 9.9  9.4  9.6    Total Protein   7.4    Albumin 4.8   4.7    Total Bilirubin 0.6   0.6    Alkaline Phosphatase 48   52    AST (SGOT) 22   16    ALT (SGPT) 17   16    WBC 6.39   5.18     Hemoglobin 12.7   12.6    Hematocrit 37.8   37.8    Platelets 269   243    Total Cholesterol   118    Triglycerides   100    HDL Cholesterol   46    LDL Cholesterol    53           Results  Testing  Loop recorder showed episodes of atrial fibrillation.             Assessment and Plan   Diagnoses and all orders for this visit:    1. Essential hypertension (Primary)  Assessment & Plan:  Hypertension is stable and controlled  Continue current treatment regimen.  Blood pressure will be reassessed in 6 months.  Continue lisinopril daily.    Orders:  -     CBC (No Diff)  -     Comprehensive Metabolic Panel    2. Bronchospasm  Comments:  seasonal, takes Pulmicort with good relief of symptoms  Assessment & Plan:  Her bronchospasm is seasonal and responds well to Pulmicort inhaler as needed    Orders:  -     budesonide (PULMICORT) 90 MCG/ACT inhaler; Inhale 1 puff 2 (Two) Times a Day As Needed (shortness of breath). Rinse and spit after use  Dispense: 3 each; Refill: 1    3. Pure hypercholesterolemia  Assessment & Plan:  Simvastatin was switched to atorvastatin after CVA 10/2023 which she is tolerating well and without myalgias.    Orders:  -     Lipid Panel    4. Paroxysmal atrial fibrillation  Assessment & Plan:  11/21/2023-noted on loop recorder placed after CVA, Eliquis started.  She denies any palpitations.      5. Acquired hypothyroidism  Assessment & Plan:  She is currently managed on Synthroid 88 mcg daily for replacement, recheck TSH.    Orders:  -     TSH    6. Stage 3b chronic kidney disease  Assessment & Plan:  She is followed by nephrology, has established care with Dr. Franki Lauren. Kidney function has remained stable.       7. Osteoporosis, senile  Assessment & Plan:  She is receiving Prolia injections through [facility], repeat DEXA scan.  She would like to have this performed in [place].  Continue daily vitamin D supplement.    Orders:  -     Vitamin D,25-Hydroxy    8. Postmenopausal  -      DEXA Bone Density Axial; Future      Assessment & Plan    A follow-up appointment is scheduled for 6 months from now.         Follow Up   Return in about 6 months (around 12/21/2024).  Patient was given instructions and counseling regarding her condition or for health maintenance advice. Please see specific information pulled into the AVS if appropriate.     Patient or patient representative verbalized consent for the use of Ambient Listening during the visit with  JUNE Mohr for chart documentation. 6/22/2024  09:22 EDT  Answers submitted by the patient for this visit:  Other (Submitted on 6/14/2024)  Please describe your symptoms.: No symptoms  Have you had these symptoms before?: Yes  How long have you been having these symptoms?: Greater than 2 weeks  Please list any medications you are currently taking for this condition.: levothyroxine, Lisinopril, Amlodipine, Atorvastation Calcium, Eliquis, Vitamin D  Please describe any probable cause for these symptoms. : Stroke, hypothyroidism  Primary Reason for Visit (Submitted on 6/14/2024)  What is the primary reason for your visit?: Other

## 2024-06-22 NOTE — ASSESSMENT & PLAN NOTE
She is followed by nephrology, has established care with Dr. Franki aLuren. Kidney function has remained stable.

## 2024-06-22 NOTE — ASSESSMENT & PLAN NOTE
Simvastatin was switched to atorvastatin after CVA 10/2023 which she is tolerating well and without myalgias.

## 2024-06-22 NOTE — ASSESSMENT & PLAN NOTE
11/21/2023-noted on loop recorder placed after CVA, Eliquis started.  She denies any palpitations.

## 2024-06-25 ENCOUNTER — PRIOR AUTHORIZATION (OUTPATIENT)
Dept: INTERNAL MEDICINE | Facility: CLINIC | Age: 84
End: 2024-06-25
Payer: MEDICARE

## 2024-06-26 DIAGNOSIS — E87.5 HYPERKALEMIA: Primary | ICD-10-CM

## 2024-07-02 ENCOUNTER — LAB (OUTPATIENT)
Dept: LAB | Facility: HOSPITAL | Age: 84
End: 2024-07-02
Payer: MEDICARE

## 2024-07-02 DIAGNOSIS — E87.5 HYPERKALEMIA: ICD-10-CM

## 2024-07-02 LAB
ANION GAP SERPL CALCULATED.3IONS-SCNC: 12 MMOL/L (ref 5–15)
BUN SERPL-MCNC: 23 MG/DL (ref 8–23)
BUN/CREAT SERPL: 17.8 (ref 7–25)
CALCIUM SPEC-SCNC: 8.9 MG/DL (ref 8.6–10.5)
CHLORIDE SERPL-SCNC: 105 MMOL/L (ref 98–107)
CO2 SERPL-SCNC: 19 MMOL/L (ref 22–29)
CREAT SERPL-MCNC: 1.29 MG/DL (ref 0.57–1)
EGFRCR SERPLBLD CKD-EPI 2021: 41.3 ML/MIN/1.73
GLUCOSE SERPL-MCNC: 100 MG/DL (ref 65–99)
POTASSIUM SERPL-SCNC: 5 MMOL/L (ref 3.5–5.2)
SODIUM SERPL-SCNC: 136 MMOL/L (ref 136–145)

## 2024-07-02 PROCEDURE — 36415 COLL VENOUS BLD VENIPUNCTURE: CPT

## 2024-07-02 PROCEDURE — 80048 BASIC METABOLIC PNL TOTAL CA: CPT

## 2024-07-25 RX ORDER — AMLODIPINE BESYLATE 10 MG/1
10 TABLET ORAL DAILY
Qty: 90 TABLET | Refills: 1 | Status: SHIPPED | OUTPATIENT
Start: 2024-07-25

## 2024-08-07 ENCOUNTER — OFFICE VISIT (OUTPATIENT)
Dept: INTERNAL MEDICINE | Facility: CLINIC | Age: 84
End: 2024-08-07
Payer: MEDICARE

## 2024-08-07 ENCOUNTER — TELEPHONE (OUTPATIENT)
Dept: INTERNAL MEDICINE | Facility: CLINIC | Age: 84
End: 2024-08-07
Payer: MEDICARE

## 2024-08-07 ENCOUNTER — HOSPITAL ENCOUNTER (OUTPATIENT)
Facility: HOSPITAL | Age: 84
Discharge: HOME | End: 2024-08-07
Payer: MEDICARE

## 2024-08-07 VITALS
OXYGEN SATURATION: 99 % | DIASTOLIC BLOOD PRESSURE: 76 MMHG | TEMPERATURE: 97.7 F | SYSTOLIC BLOOD PRESSURE: 122 MMHG | BODY MASS INDEX: 24.7 KG/M2 | HEIGHT: 61 IN | HEART RATE: 62 BPM | WEIGHT: 130.8 LBS

## 2024-08-07 DIAGNOSIS — R05.1 ACUTE COUGH: ICD-10-CM

## 2024-08-07 DIAGNOSIS — U07.1 COVID: ICD-10-CM

## 2024-08-07 DIAGNOSIS — J11.1 FLU: ICD-10-CM

## 2024-08-07 DIAGNOSIS — R05.1 ACUTE COUGH: Primary | ICD-10-CM

## 2024-08-07 LAB
EXPIRATION DATE: ABNORMAL
FLUAV AG UPPER RESP QL IA.RAPID: NOT DETECTED
FLUBV AG UPPER RESP QL IA.RAPID: DETECTED
INTERNAL CONTROL: ABNORMAL
Lab: ABNORMAL
SARS-COV-2 AG UPPER RESP QL IA.RAPID: DETECTED

## 2024-08-07 PROCEDURE — 3074F SYST BP LT 130 MM HG: CPT

## 2024-08-07 PROCEDURE — 99213 OFFICE O/P EST LOW 20 MIN: CPT

## 2024-08-07 PROCEDURE — 87428 SARSCOV & INF VIR A&B AG IA: CPT

## 2024-08-07 PROCEDURE — 3078F DIAST BP <80 MM HG: CPT

## 2024-08-07 PROCEDURE — 71046 X-RAY EXAM CHEST 2 VIEWS: CPT

## 2024-08-07 PROCEDURE — 1126F AMNT PAIN NOTED NONE PRSNT: CPT

## 2024-08-07 RX ORDER — METHYLPREDNISOLONE 4 MG/1
TABLET ORAL
Qty: 21 TABLET | Refills: 0 | Status: SHIPPED | OUTPATIENT
Start: 2024-08-07

## 2024-08-07 NOTE — PROGRESS NOTES
Chief Complaint  Cough, URI, and Fever     Subjective:      History of Present Illness {CC  Problem List  Visit  Diagnosis   Encounters  Notes  Medications  Labs  Result Review Imaging  Media :23}     Sylvia Stover presents to [facility] for:      History of Present Illness  Pt states she has had a cough for the last few days. Pt had a fever yesterday of 100.1. Pt states she has been feeling better today. PT has been on vacation and has traveled on a plane recently.   Cough  This is a new problem. The current episode started in the past 7 days. The problem has been worsening. The problem occurs hourly. The cough is Rattling. Associated symptoms include nasal congestion. Pertinent negatives include no chest pain, chills, ear congestion, ear pain, fever, headaches, heartburn, hemoptysis, myalgias, shortness of breath or wheezing. Nothing aggravates the symptoms.              I have reviewed patient's medical history, any new submitted information provided by patient or medical assistant and updated medical record.      Objective:      Physical Exam  Vitals reviewed.   Constitutional:       Appearance: Normal appearance. She is ill-appearing.   HENT:      Head: Normocephalic.      Right Ear: Tympanic membrane, ear canal and external ear normal. There is no impacted cerumen.      Left Ear: Tympanic membrane, ear canal and external ear normal. There is no impacted cerumen.      Nose: Congestion present.      Mouth/Throat:      Pharynx: Posterior oropharyngeal erythema present.   Cardiovascular:      Rate and Rhythm: Normal rate and regular rhythm.      Pulses: Normal pulses.      Heart sounds: Normal heart sounds.   Pulmonary:      Effort: Pulmonary effort is normal.      Breath sounds: Rhonchi present.   Chest:      Chest wall: Tenderness: BLE.   Lymphadenopathy:      Cervical: Cervical adenopathy present.   Skin:     General: Skin is warm and dry.      Capillary Refill:  "Capillary refill takes less than 2 seconds.   Neurological:      General: No focal deficit present.      Mental Status: She is alert.   Psychiatric:         Mood and Affect: Mood normal.         Behavior: Behavior normal.        Result Review  Data Reviewed:{ Labs  Result Review  Imaging  Med Tab  Media :23}                Vital Signs:   /76 (BP Location: Right arm, Patient Position: Sitting, Cuff Size: Adult)   Pulse 62   Temp 97.7 °F (36.5 °C) (Oral)   Ht 154.9 cm (61\")   Wt 59.3 kg (130 lb 12.8 oz)   SpO2 99%   BMI 24.71 kg/m²   Estimated body mass index is 24.71 kg/m² as calculated from the following:    Height as of this encounter: 154.9 cm (61\").    Weight as of this encounter: 59.3 kg (130 lb 12.8 oz).        Requested Prescriptions     Signed Prescriptions Disp Refills    methylPREDNISolone (MEDROL) 4 MG dose pack 21 tablet 0     Sig: Take as directed on package instructions.       Routine medications provided by this office will also be refilled via pharmacy request.       Current Outpatient Medications:     amLODIPine (NORVASC) 10 MG tablet, TAKE 1 TABLET DAILY, Disp: 90 tablet, Rfl: 1    amoxicillin (AMOXIL) 500 MG capsule, BEFORE DENTAL PROCEDURES, Disp: , Rfl:     apixaban (ELIQUIS) 2.5 MG tablet tablet, Take 1 tablet by mouth Every 12 (Twelve) Hours., Disp: 14 tablet, Rfl: 0    atorvastatin (LIPITOR) 20 MG tablet, TAKE 1 TABLET DAILY, Disp: 90 tablet, Rfl: 1    cholecalciferol (Vitamin D) 25 MCG (1000 UT) tablet, Take 1 tablet by mouth Every Other Day., Disp: 30 tablet, Rfl: 5    levothyroxine (SYNTHROID, LEVOTHROID) 88 MCG tablet, TAKE 1 TABLET DAILY, Disp: 90 tablet, Rfl: 1    lisinopril (PRINIVIL,ZESTRIL) 20 MG tablet, Take 1 tablet by mouth Daily., Disp: 90 tablet, Rfl: 1    fluticasone (Flovent HFA) 110 MCG/ACT inhaler, Inhale 1 puff 2 (Two) Times a Day. Rinse and spit after use, Disp: 12 g, Rfl: 1    Fluticasone Furoate 50 MCG/ACT aerosol powder , Inhale 1 Inhalation Daily As " Needed (Bronchospasm). Rinse and spit after use, Disp: 60 each, Rfl: 1    methylPREDNISolone (MEDROL) 4 MG dose pack, Take as directed on package instructions., Disp: 21 tablet, Rfl: 0     Assessment and Plan:      Assessment and Plan {CC Problem List  Visit Diagnosis  ROS  Review (Popup)  Health Maintenance  Quality  BestPractice  Medications  SmartSets  SnapShot Encounters  Media :23}     Diagnoses and all orders for this visit:    1. Acute cough (Primary)  -     POCT SARS-CoV-2 Antigen BERNADETTE + Flu  -     XR Chest PA & Lateral; Future  -     methylPREDNISolone (MEDROL) 4 MG dose pack; Take as directed on package instructions.  Dispense: 21 tablet; Refill: 0    2. COVID  -     methylPREDNISolone (MEDROL) 4 MG dose pack; Take as directed on package instructions.  Dispense: 21 tablet; Refill: 0    3. Flu  -     methylPREDNISolone (MEDROL) 4 MG dose pack; Take as directed on package instructions.  Dispense: 21 tablet; Refill: 0             New Medications Ordered This Visit   Medications    methylPREDNISolone (MEDROL) 4 MG dose pack     Sig: Take as directed on package instructions.     Dispense:  21 tablet     Refill:  0     Educated patient on dosing and side effects of Medrol Dosepak.  Educated patient that she was positive for COVID and flu.  Will get x-ray of patient's lungs and review with patient once available.  Educated patient heavily that if she becomes short of breath, cannot get her fever down, or symptoms significantly worsen to be seen in the emergency room.  Patient verbalized understanding and is comfortable with the plan of care.      Follow Up {Instructions Charge Capture  Follow-up Communications :23}     Return if symptoms worsen or fail to improve.      Patient was given instructions and counseling regarding her condition or for health maintenance advice. Please see specific information pulled into the AVS if appropriate.    Dragon disclaimer:   Much of this encounter note is an  electronic transcription/translation of spoken language to printed text. The electronic translation of spoken language may permit erroneous, or at times, nonsensical words or phrases to be inadvertently transcribed; Although I have reviewed the note for such errors, some may still exist.     Additional Patient Counseling:       There are no Patient Instructions on file for this visit.

## 2024-08-07 NOTE — TELEPHONE ENCOUNTER
Patient was in to see Dinorah Almanza on 8/7. Per Dinorah, patient states that insurance isn't covering Pulmicort inhaler. Alternative Rx?

## 2024-08-08 DIAGNOSIS — J98.01 BRONCHOSPASM: Primary | Chronic | ICD-10-CM

## 2024-08-08 RX ORDER — FLUTICASONE PROPIONATE 110 UG/1
1 AEROSOL, METERED RESPIRATORY (INHALATION)
Qty: 12 G | Refills: 1 | Status: SHIPPED | OUTPATIENT
Start: 2024-08-08

## 2024-08-08 NOTE — TELEPHONE ENCOUNTER
Please notify patient I have sent a prescription for Flovent to the pharmacy.  Please notify office if inhaler is not covered.

## 2024-09-10 ENCOUNTER — PATIENT MESSAGE (OUTPATIENT)
Dept: INTERNAL MEDICINE | Facility: CLINIC | Age: 84
End: 2024-09-10
Payer: MEDICARE

## 2024-09-10 ENCOUNTER — TELEPHONE (OUTPATIENT)
Dept: INTERNAL MEDICINE | Facility: CLINIC | Age: 84
End: 2024-09-10
Payer: MEDICARE

## 2024-09-10 NOTE — TELEPHONE ENCOUNTER
Caller: Sylvia Stover    Relationship to patient: Self    Best call back number: [phone]    Date of positive COVID19 test: PATIENT WANTED TO COME TO OFFICE TODAY TO BE TESTED FOR COVID     Date of possible COVID19 exposure: HASN'T HAD ONE SHE KNOW OF     COVID19 symptoms: COUGH/DIARRHEA PATIENT STATED COUGH STARTED LAST NIGHT AND DIARRHEA STARTED THIS MORNING     PLEASE GIVE PATIENT A CALL BACK IF ELVIA HOLBROOK WOULD LIKE HER TO COME TO THE OFFICE TO BE TESTED FOR COVID WITH THE SYMPTOMS SHE IS HAVING

## 2024-09-10 NOTE — TELEPHONE ENCOUNTER
Spoke with patient, she states she is feeling a lot better. She took some cough medication last night and has tested negative for covid. Patient states she will try Mucinex. She states the OTC medication she is using now has been keeping her up at night. She will let us know if sx don't improve.

## 2024-09-17 RX ORDER — ATORVASTATIN CALCIUM 20 MG/1
20 TABLET, FILM COATED ORAL DAILY
Qty: 90 TABLET | Refills: 1 | Status: SHIPPED | OUTPATIENT
Start: 2024-09-17

## 2024-10-08 ENCOUNTER — HOSPITAL ENCOUNTER (OUTPATIENT)
Dept: BONE DENSITY | Facility: HOSPITAL | Age: 84
Discharge: HOME | End: 2024-10-08
Admitting: NURSE PRACTITIONER
Payer: MEDICARE

## 2024-10-08 DIAGNOSIS — Z78.0 POSTMENOPAUSAL: ICD-10-CM

## 2024-10-08 PROCEDURE — 77080 DXA BONE DENSITY AXIAL: CPT

## 2024-10-19 ENCOUNTER — PATIENT MESSAGE (OUTPATIENT)
Dept: INTERNAL MEDICINE | Facility: CLINIC | Age: 84
End: 2024-10-19
Payer: MEDICARE

## 2024-11-11 DIAGNOSIS — M81.0 OSTEOPOROSIS, SENILE: Primary | Chronic | ICD-10-CM

## 2024-11-19 ENCOUNTER — HOSPITAL ENCOUNTER (OUTPATIENT)
Dept: INFUSION THERAPY | Facility: HOSPITAL | Age: 84
Discharge: HOME | End: 2024-11-19
Admitting: NURSE PRACTITIONER
Payer: MEDICARE

## 2024-11-19 VITALS
TEMPERATURE: 97.3 F | RESPIRATION RATE: 18 BRPM | DIASTOLIC BLOOD PRESSURE: 74 MMHG | SYSTOLIC BLOOD PRESSURE: 134 MMHG | HEART RATE: 70 BPM | OXYGEN SATURATION: 100 %

## 2024-11-19 DIAGNOSIS — M81.0 OSTEOPOROSIS, SENILE: Primary | ICD-10-CM

## 2024-11-19 LAB
25(OH)D3 SERPL-MCNC: 36.3 NG/ML (ref 30–100)
ALBUMIN SERPL-MCNC: 4.5 G/DL (ref 3.5–5.2)
ALBUMIN/GLOB SERPL: 1.5 G/DL
ALP SERPL-CCNC: 48 U/L (ref 39–117)
ALT SERPL W P-5'-P-CCNC: 14 U/L (ref 1–33)
ANION GAP SERPL CALCULATED.3IONS-SCNC: 13 MMOL/L (ref 5–15)
AST SERPL-CCNC: 14 U/L (ref 1–32)
BILIRUB SERPL-MCNC: 0.5 MG/DL (ref 0–1.2)
BUN SERPL-MCNC: 32 MG/DL (ref 8–23)
BUN/CREAT SERPL: 20.8 (ref 7–25)
CALCIUM SPEC-SCNC: 9.2 MG/DL (ref 8.6–10.5)
CHLORIDE SERPL-SCNC: 98 MMOL/L (ref 98–107)
CO2 SERPL-SCNC: 21 MMOL/L (ref 22–29)
CREAT SERPL-MCNC: 1.54 MG/DL (ref 0.57–1)
EGFRCR SERPLBLD CKD-EPI 2021: 33.4 ML/MIN/1.73
GLOBULIN UR ELPH-MCNC: 3 GM/DL
GLUCOSE SERPL-MCNC: 102 MG/DL (ref 65–99)
MAGNESIUM SERPL-MCNC: 2.3 MG/DL (ref 1.6–2.4)
PHOSPHATE SERPL-MCNC: 3.4 MG/DL (ref 2.5–4.5)
POTASSIUM SERPL-SCNC: 4.6 MMOL/L (ref 3.5–5.2)
PROT SERPL-MCNC: 7.5 G/DL (ref 6–8.5)
SODIUM SERPL-SCNC: 132 MMOL/L (ref 136–145)

## 2024-11-19 PROCEDURE — 96372 THER/PROPH/DIAG INJ SC/IM: CPT

## 2024-11-19 PROCEDURE — 82306 VITAMIN D 25 HYDROXY: CPT | Performed by: NURSE PRACTITIONER

## 2024-11-19 PROCEDURE — 80053 COMPREHEN METABOLIC PANEL: CPT | Performed by: NURSE PRACTITIONER

## 2024-11-19 PROCEDURE — 83735 ASSAY OF MAGNESIUM: CPT | Performed by: NURSE PRACTITIONER

## 2024-11-19 PROCEDURE — 25010000002 DENOSUMAB 60 MG/ML SOLUTION PREFILLED SYRINGE: Performed by: NURSE PRACTITIONER

## 2024-11-19 PROCEDURE — 84100 ASSAY OF PHOSPHORUS: CPT | Performed by: NURSE PRACTITIONER

## 2024-11-19 PROCEDURE — 36415 COLL VENOUS BLD VENIPUNCTURE: CPT

## 2024-11-19 RX ADMIN — DENOSUMAB 60 MG: 60 INJECTION SUBCUTANEOUS at 13:07

## 2024-11-29 DIAGNOSIS — E03.9 ACQUIRED HYPOTHYROIDISM: ICD-10-CM

## 2024-12-02 RX ORDER — LEVOTHYROXINE SODIUM 88 UG/1
88 TABLET ORAL DAILY
Qty: 90 TABLET | Refills: 1 | Status: SHIPPED | OUTPATIENT
Start: 2024-12-02

## 2024-12-31 ENCOUNTER — OFFICE VISIT (OUTPATIENT)
Dept: INTERNAL MEDICINE | Facility: CLINIC | Age: 84
End: 2024-12-31
Payer: MEDICARE

## 2024-12-31 VITALS
WEIGHT: 132.4 LBS | HEART RATE: 82 BPM | BODY MASS INDEX: 25 KG/M2 | OXYGEN SATURATION: 100 % | DIASTOLIC BLOOD PRESSURE: 78 MMHG | HEIGHT: 61 IN | SYSTOLIC BLOOD PRESSURE: 130 MMHG

## 2024-12-31 DIAGNOSIS — I48.0 PAROXYSMAL ATRIAL FIBRILLATION: Chronic | ICD-10-CM

## 2024-12-31 DIAGNOSIS — I10 ESSENTIAL HYPERTENSION: Chronic | ICD-10-CM

## 2024-12-31 DIAGNOSIS — M81.0 OSTEOPOROSIS, SENILE: ICD-10-CM

## 2024-12-31 DIAGNOSIS — E03.9 ACQUIRED HYPOTHYROIDISM: Chronic | ICD-10-CM

## 2024-12-31 DIAGNOSIS — Z00.00 MEDICARE ANNUAL WELLNESS VISIT, SUBSEQUENT: Primary | ICD-10-CM

## 2024-12-31 DIAGNOSIS — E78.00 PURE HYPERCHOLESTEROLEMIA: Chronic | ICD-10-CM

## 2024-12-31 PROCEDURE — G0439 PPPS, SUBSEQ VISIT: HCPCS | Performed by: NURSE PRACTITIONER

## 2024-12-31 PROCEDURE — 1159F MED LIST DOCD IN RCRD: CPT | Performed by: NURSE PRACTITIONER

## 2024-12-31 PROCEDURE — 3078F DIAST BP <80 MM HG: CPT | Performed by: NURSE PRACTITIONER

## 2024-12-31 PROCEDURE — 1126F AMNT PAIN NOTED NONE PRSNT: CPT | Performed by: NURSE PRACTITIONER

## 2024-12-31 PROCEDURE — 99214 OFFICE O/P EST MOD 30 MIN: CPT | Performed by: NURSE PRACTITIONER

## 2024-12-31 PROCEDURE — 1160F RVW MEDS BY RX/DR IN RCRD: CPT | Performed by: NURSE PRACTITIONER

## 2024-12-31 PROCEDURE — 3074F SYST BP LT 130 MM HG: CPT | Performed by: NURSE PRACTITIONER

## 2025-01-11 NOTE — PATIENT INSTRUCTIONS
Medicare Wellness  Personal Prevention Plan of Service     Date of Office Visit:    Encounter Provider:  JUNE Mohr  Place of Service:  [facility]  Patient Name: Sylvia Stover  :  1940    As part of the Medicare Wellness portion of your visit today, we are providing you with this personalized preventive plan of services (PPPS). This plan is based upon recommendations of the [place] Preventive Services Task Force (USPSTF) and the Advisory Committee on Immunization Practices (ACIP).    This lists the preventive care services that should be considered, and provides dates of when you are due. Items listed as completed are up-to-date and do not require any further intervention.    Health Maintenance   Topic Date Due    ANNUAL WELLNESS VISIT  2024    LIPID PANEL  2025    BMI FOLLOWUP  2025    COLORECTAL CANCER SCREENING  2026    DXA SCAN  10/08/2026    TDAP/TD VACCINES (4 - Td or Tdap) 2030    COVID-19 Vaccine  Completed    RSV Vaccine - Adults  Completed    INFLUENZA VACCINE  Completed    Pneumococcal Vaccine 65+  Completed    ZOSTER VACCINE  Completed    MAMMOGRAM  Discontinued       No orders of the defined types were placed in this encounter.      Return in about 6 months (around 2025).

## 2025-01-11 NOTE — ASSESSMENT & PLAN NOTE
Her blood pressure readings have been generally well-regulated, with the exception of a slightly elevated diastolic reading today,which could be attributed to her missed dose of lisinopril. Her blood pressure was 152/81 on 11/27/2024. She will continue her current antihypertensive regimen, including lisinopril and amlodipine with close monitoring.

## 2025-01-11 NOTE — PROGRESS NOTES
Subjective   The ABCs of the Annual Wellness Visit  Medicare Wellness Visit      Sylvia Stover is a 84 y.o. patient who presents for a Medicare Wellness Visit.    The following portions of the patient's history were reviewed and   updated as appropriate: allergies, current medications, past family history, past medical history, past social history, past surgical history, and problem list.    Compared to one year ago, the patient's physical   health is the same.  Compared to one year ago, the patient's mental   health is the same.    Recent Hospitalizations:  She was not admitted to the hospital during the last year.     Current Medical Providers:  Patient Care Team:  Rhoda Luna APRN as PCP - General (Internal Medicine)  Jadiel Spence MD as Consulting Physician (Nephrology)    Outpatient Medications Prior to Visit   Medication Sig Dispense Refill    amLODIPine (NORVASC) 10 MG tablet TAKE 1 TABLET DAILY 90 tablet 1    amoxicillin (AMOXIL) 500 MG capsule BEFORE DENTAL PROCEDURES      apixaban (ELIQUIS) 2.5 MG tablet tablet Take 1 tablet by mouth Every 12 (Twelve) Hours. 180 tablet 1    atorvastatin (LIPITOR) 20 MG tablet TAKE 1 TABLET DAILY 90 tablet 1    cholecalciferol (Vitamin D) 25 MCG (1000 UT) tablet Take 1 tablet by mouth Every Other Day. 30 tablet 5    fluticasone (Flovent HFA) 110 MCG/ACT inhaler Inhale 1 puff 2 (Two) Times a Day. Rinse and spit after use 12 g 1    Fluticasone Furoate 50 MCG/ACT aerosol powder  Inhale 1 Inhalation Daily As Needed (Bronchospasm). Rinse and spit after use 60 each 1    levothyroxine (SYNTHROID, LEVOTHROID) 88 MCG tablet TAKE 1 TABLET DAILY 90 tablet 1    lisinopril (PRINIVIL,ZESTRIL) 20 MG tablet Take 1 tablet by mouth Daily. 90 tablet 1    methylPREDNISolone (MEDROL) 4 MG dose pack Take as directed on package instructions. 21 tablet 0     No facility-administered medications prior to visit.     No opioid medication identified on active medication list. I have  "reviewed chart for other potential  high risk medication/s and harmful drug interactions in the elderly.      Aspirin is not on active medication list.  Aspirin use is not indicated based on review of current medical condition/s. Risk of harm outweighs potential benefits.  .    Patient Active Problem List   Diagnosis    Acquired hypothyroidism    Essential hypertension    Hyperlipidemia    Stage 3 chronic kidney disease    Osteoarthritis of right knee    Osteoporosis, senile    APC (atrial premature contractions)    Carpal tunnel syndrome    Mobitz type 2 second degree AV block    Paroxysmal atrial fibrillation    Bronchospasm    Gastroesophageal reflux disease without esophagitis    H/O ischemic left MCA stroke     Advance Care Planning Advance Directive is not on file.  ACP discussion was held with the patient during this visit. Patient has an advance directive (not in EMR), copy requested.            Objective   Vitals:    24 1259 24 1331   BP: 120/88 130/78   BP Location: Left arm Left arm   Patient Position: Sitting Sitting   Cuff Size: Adult Adult   Pulse: 82    SpO2: 100%    Weight: 60.1 kg (132 lb 6.4 oz)    Height: 154.9 cm (61\")    PainSc: 0-No pain        Estimated body mass index is 25.02 kg/m² as calculated from the following:    Height as of this encounter: 154.9 cm (61\").    Weight as of this encounter: 60.1 kg (132 lb 6.4 oz).                Does the patient have evidence of cognitive impairment? No                                                                                                Health  Risk Assessment    Smoking Status:  Social History     Tobacco Use   Smoking Status Former    Current packs/day: 0.00    Average packs/day: 0.3 packs/day for 46.0 years (11.5 ttl pk-yrs)    Types: Cigarettes    Start date: 1961    Quit date: 2007    Years since quittin.0   Smokeless Tobacco Never   Tobacco Comments    My tobacco use varied from year to year.  When I quit I was " smoking three cigarettes a week.     Alcohol Consumption:  Social History     Substance and Sexual Activity   Alcohol Use No    Comment: caffeine1 cup daily       Fall Risk Screen  STEADI Fall Risk Assessment has not been completed.    Depression Screening   Little interest or pleasure in doing things? Not at all   Feeling down, depressed, or hopeless? Not at all   PHQ-2 Total Score 0      Health Habits and Functional and Cognitive Screenin/31/2024     1:03 PM   Functional & Cognitive Status   Do you have difficulty preparing food and eating? No   Do you have difficulty bathing yourself, getting dressed or grooming yourself? No   Do you have difficulty using the toilet? No   Do you have difficulty moving around from place to place? No   Do you have trouble with steps or getting out of a bed or a chair? No   Current Diet Well Balanced Diet   Dental Exam Up to date   Eye Exam Up to date   Exercise (times per week) 6 times per week   Current Exercises Include Walking;House Cleaning   Do you need help using the phone?  No   Are you deaf or do you have serious difficulty hearing?  No   Do you need help to go to places out of walking distance? No   Do you need help shopping? No   Do you need help preparing meals?  No   Do you need help with housework?  No   Do you need help with laundry? No   Do you need help taking your medications? No   Do you need help managing money? No   Do you ever drive or ride in a car without wearing a seat belt? No   Have you felt unusual stress, anger or loneliness in the last month? No   Who do you live with? Alone   If you need help, do you have trouble finding someone available to you? No   Have you been bothered in the last four weeks by sexual problems? No   Do you have difficulty concentrating, remembering or making decisions? No           Age-appropriate Screening Schedule:  Refer to the list below for future screening recommendations based on patient's age, sex and/or medical  conditions. Orders for these recommended tests are listed in the plan section. The patient has been provided with a written plan.    Health Maintenance List  Health Maintenance   Topic Date Due    ANNUAL WELLNESS VISIT  12/20/2024    LIPID PANEL  06/21/2025    BMI FOLLOWUP  06/21/2025    COLORECTAL CANCER SCREENING  05/12/2026    DXA SCAN  10/08/2026    TDAP/TD VACCINES (4 - Td or Tdap) 08/16/2030    COVID-19 Vaccine  Completed    RSV Vaccine - Adults  Completed    INFLUENZA VACCINE  Completed    Pneumococcal Vaccine 65+  Completed    ZOSTER VACCINE  Completed    MAMMOGRAM  Discontinued                                                                                                                                                CMS Preventative Services Quick Reference  Risk Factors Identified During Encounter  Fall Risk-High or Moderate: Discussed Fall Prevention in the home    The above risks/problems have been discussed with the patient.  Pertinent information has been shared with the patient in the After Visit Summary.  An After Visit Summary and PPPS were made available to the patient.    Follow Up:   Next Medicare Wellness visit to be scheduled in 1 year.         Additional E&M Note during same encounter follows:  Patient has additional, significant, and separately identifiable condition(s)/problem(s) that require work above and beyond the Medicare Wellness Visit     Chief Complaint  Medicare Wellness-subsequent    Subjective    HPI  Sylvia is also being seen today for additional medical problem/s.       The patient presents for evaluation of hypertension, osteoporosis, constipation, cough, and sleep issues.    She has been managing her blood pressure effectively, with readings taken approximately once a month with readings < 135/85. She inadvertently missed her lisinopril dose today. She is followed annually by nephrology for mgmt of CKD.    She continues to receive Prolia injections without any complications.  "She maintains a regular walking routine but does not supplement with calcium (c/o constipation). She reports no recent falls or balance issues.    She has a lifelong history of constipation, which she manages by consuming apples when bowel movements are infrequent.    She contracted COVID-19 during a vacation in August 2024, from which she has largely recovered, although she still experiences a mild cough. She experienced fatigue for a period following her infection but does not consider it a significant concern.    She reports satisfactory sleep quality, although she occasionally wakes up during the night and struggles to return to sleep. She finds that eating something can aid in falling back asleep. She does not experience heartburn at night.    She has not been utilizing Flovent due to perceived difficulty in its use (changed from Pulmicort due to formulary changes). She reports no current illnesses, allergies, headaches, dizziness, shortness of breath, heartburn, or indigestion.    Review of Systems   Constitutional:  Negative for fatigue.   HENT:  Positive for congestion, postnasal drip and rhinorrhea.    Respiratory:  Negative for cough, chest tightness and shortness of breath.    Cardiovascular:  Negative for chest pain, palpitations and leg swelling.   Gastrointestinal:  Negative for abdominal pain.   Musculoskeletal:  Positive for arthralgias.   Psychiatric/Behavioral:  Positive for sleep disturbance.           Objective   Vital Signs:  /78 (BP Location: Left arm, Patient Position: Sitting, Cuff Size: Adult)   Pulse 82   Ht 154.9 cm (61\")   Wt 60.1 kg (132 lb 6.4 oz)   SpO2 100%   BMI 25.02 kg/m²   Physical Exam  Constitutional:       Appearance: She is well-developed. She is not ill-appearing.   HENT:      Head: Normocephalic.      Right Ear: Hearing, tympanic membrane and external ear normal.      Left Ear: Hearing, tympanic membrane and external ear normal.      Nose: Nose normal. No nasal " deformity, mucosal edema or rhinorrhea.      Right Sinus: No maxillary sinus tenderness or frontal sinus tenderness.      Left Sinus: No maxillary sinus tenderness or frontal sinus tenderness.      Mouth/Throat:      Dentition: Normal dentition.   Eyes:      General: Lids are normal.         Right eye: No discharge.         Left eye: No discharge.      Conjunctiva/sclera: Conjunctivae normal.      Right eye: No exudate.     Left eye: No exudate.  Neck:      Thyroid: No thyroid mass or thyromegaly.      Vascular: No carotid bruit.      Trachea: Trachea normal.   Cardiovascular:      Rate and Rhythm: Regular rhythm.      Pulses: Normal pulses.      Heart sounds: Normal heart sounds. No murmur heard.  Pulmonary:      Effort: No respiratory distress.      Breath sounds: Normal breath sounds. No decreased breath sounds, wheezing, rhonchi or rales.   Abdominal:      General: Bowel sounds are normal.      Palpations: Abdomen is soft.      Tenderness: There is no abdominal tenderness.   Musculoskeletal:      Cervical back: Normal range of motion. No edema.   Lymphadenopathy:      Head:      Right side of head: No submental, submandibular, tonsillar, preauricular, posterior auricular or occipital adenopathy.      Left side of head: No submental, submandibular, tonsillar, preauricular, posterior auricular or occipital adenopathy.   Skin:     General: Skin is warm and dry.      Nails: There is no clubbing.   Neurological:      Mental Status: She is alert.   Psychiatric:         Behavior: Behavior is cooperative.           Throat looks normal.  Lungs are clear.    Vital Signs  Blood pressure is 130/78.    The following data was reviewed by: JUNE Mohr on 12/31/2024:  Data reviewed : Consultant notes 8/7/24 office note  Common labs          6/21/2024    14:51 7/2/2024    12:39 11/19/2024    12:19   Common Labs   Glucose 98  100  102    BUN 23  23  32    Creatinine 1.45  1.29  1.54    Sodium 136  136  132    Potassium  5.7  5.0  4.6    Chloride 102  105  98    Calcium 9.6  8.9  9.2    Total Protein 7.4      Albumin 4.7   4.5    Total Bilirubin 0.6   0.5    Alkaline Phosphatase 52   48    AST (SGOT) 16   14    ALT (SGPT) 16   14    WBC 5.18      Hemoglobin 12.6      Hematocrit 37.8      Platelets 243      Total Cholesterol 118      Triglycerides 100      HDL Cholesterol 46      LDL Cholesterol  53          Results  Laboratory Studies  Creatinine was 1.5. Vitamin D level was 36. Phosphorus and magnesium were normal.    Imaging  Bone density test: Left hip was -2.1, right hip was -2.1, total hip was -2.3.              Assessment and Plan Additional age appropriate preventative wellness advice topics were discussed during today's preventative wellness exam(some topics already addressed during AWV portion of the note above):    Physical Activity: Advised cardiovascular activity 150 minutes per week as tolerated. (example brisk walk for 30 minutes, 5 days a week).            Diagnoses and all orders for this visit:    1. Medicare annual wellness visit, subsequent (Primary)    2. Osteoporosis, senile  Assessment & Plan:  Her bone density remains within the osteoporotic range, but it is stable. She was advised to engage in weight-bearing exercises such as walking and light weightlifting. She was also advised to take a daily calcium supplement of 500 to 600 mg with vitamin D, but this recommendation was later retracted due to her history of constipation. She will continue her Prolia injections.      3. Essential hypertension  Assessment & Plan:  Her blood pressure readings have been generally well-regulated, with the exception of a slightly elevated diastolic reading today,which could be attributed to her missed dose of lisinopril. Her blood pressure was 152/81 on 11/27/2024. She will continue her current antihypertensive regimen, including lisinopril and amlodipine with close monitoring.      4. Pure hypercholesterolemia  Assessment &  Plan:  She denies myalgias with atorvastatin which she will continue along with a low-fat, low-cholesterol diet.   Lab Results   Component Value Date    LDL 53 06/21/2024           5. Paroxysmal atrial fibrillation  Assessment & Plan:  11/21/2023-noted on loop recorder placed after CVA, Eliquis started.  She denies any palpitations.      6. Acquired hypothyroidism  Assessment & Plan:  TSH 0.343 with 6/21/24 labs, continue Synthroid 88 mcg daily for replacement.               Follow Up   Return in about 6 months (around 6/30/2025).  Patient was given instructions and counseling regarding her condition or for health maintenance advice. Please see specific information pulled into the AVS if appropriate.  Patient or patient representative verbalized consent for the use of Ambient Listening during the visit with  JUNE Mohr for chart documentation. 1/11/2025  11:42 EST

## 2025-01-11 NOTE — ASSESSMENT & PLAN NOTE
Her bone density remains within the osteoporotic range, but it is stable. She was advised to engage in weight-bearing exercises such as walking and light weightlifting. She was also advised to take a daily calcium supplement of 500 to 600 mg with vitamin D, but this recommendation was later retracted due to her history of constipation. She will continue her Prolia injections.

## 2025-01-11 NOTE — ASSESSMENT & PLAN NOTE
She denies myalgias with atorvastatin which she will continue along with a low-fat, low-cholesterol diet.   Lab Results   Component Value Date    LDL 53 06/21/2024

## 2025-01-20 RX ORDER — AMLODIPINE BESYLATE 10 MG/1
10 TABLET ORAL DAILY
Qty: 90 TABLET | Refills: 1 | Status: SHIPPED | OUTPATIENT
Start: 2025-01-20

## 2025-01-28 ENCOUNTER — PATIENT MESSAGE (OUTPATIENT)
Dept: INTERNAL MEDICINE | Facility: CLINIC | Age: 85
End: 2025-01-28
Payer: MEDICARE

## 2025-02-05 DIAGNOSIS — I10 ESSENTIAL HYPERTENSION: Chronic | ICD-10-CM

## 2025-02-05 RX ORDER — LISINOPRIL 20 MG/1
20 TABLET ORAL DAILY
Qty: 90 TABLET | Refills: 3 | Status: SHIPPED | OUTPATIENT
Start: 2025-02-05

## 2025-03-21 RX ORDER — ATORVASTATIN CALCIUM 20 MG/1
20 TABLET, FILM COATED ORAL DAILY
Qty: 90 TABLET | Refills: 1 | Status: SHIPPED | OUTPATIENT
Start: 2025-03-21

## 2025-04-17 RX ORDER — APIXABAN 2.5 MG/1
2.5 TABLET, FILM COATED ORAL
Qty: 180 TABLET | Refills: 1 | Status: SHIPPED | OUTPATIENT
Start: 2025-04-17

## 2025-05-09 ENCOUNTER — TRANSCRIBE ORDERS (OUTPATIENT)
Dept: ADMINISTRATIVE | Facility: HOSPITAL | Age: 85
End: 2025-05-09
Payer: MEDICARE

## 2025-05-09 DIAGNOSIS — I73.9 PERIPHERAL VASCULAR DISEASE, UNSPECIFIED: Primary | ICD-10-CM

## 2025-05-28 DIAGNOSIS — E03.9 ACQUIRED HYPOTHYROIDISM: ICD-10-CM

## 2025-05-28 RX ORDER — LEVOTHYROXINE SODIUM 88 UG/1
88 TABLET ORAL DAILY
Qty: 90 TABLET | Refills: 1 | Status: SHIPPED | OUTPATIENT
Start: 2025-05-28

## 2025-07-02 ENCOUNTER — OFFICE VISIT (OUTPATIENT)
Dept: INTERNAL MEDICINE | Facility: CLINIC | Age: 85
End: 2025-07-02
Payer: MEDICARE

## 2025-07-02 VITALS
OXYGEN SATURATION: 99 % | TEMPERATURE: 97.9 F | HEART RATE: 67 BPM | HEIGHT: 61 IN | BODY MASS INDEX: 24.51 KG/M2 | WEIGHT: 129.8 LBS | SYSTOLIC BLOOD PRESSURE: 132 MMHG | DIASTOLIC BLOOD PRESSURE: 84 MMHG

## 2025-07-02 DIAGNOSIS — E78.00 PURE HYPERCHOLESTEROLEMIA: Chronic | ICD-10-CM

## 2025-07-02 DIAGNOSIS — I48.0 PAROXYSMAL ATRIAL FIBRILLATION: Chronic | ICD-10-CM

## 2025-07-02 DIAGNOSIS — I10 ESSENTIAL HYPERTENSION: Primary | Chronic | ICD-10-CM

## 2025-07-02 DIAGNOSIS — E03.9 ACQUIRED HYPOTHYROIDISM: Chronic | ICD-10-CM

## 2025-07-02 DIAGNOSIS — H81.10 BENIGN PAROXYSMAL POSITIONAL VERTIGO, UNSPECIFIED LATERALITY: ICD-10-CM

## 2025-07-02 RX ORDER — MECLIZINE HCL 12.5 MG 12.5 MG/1
TABLET ORAL
Qty: 30 TABLET | Refills: 0 | Status: SHIPPED | OUTPATIENT
Start: 2025-07-02

## 2025-07-03 ENCOUNTER — RESULTS FOLLOW-UP (OUTPATIENT)
Dept: INTERNAL MEDICINE | Facility: CLINIC | Age: 85
End: 2025-07-03
Payer: MEDICARE

## 2025-07-03 LAB
ALBUMIN SERPL-MCNC: 4.8 G/DL (ref 3.5–5.2)
ALBUMIN/GLOB SERPL: 1.7 G/DL
ALP SERPL-CCNC: 50 U/L (ref 39–117)
ALT SERPL-CCNC: 15 U/L (ref 1–33)
AST SERPL-CCNC: 16 U/L (ref 1–32)
BILIRUB SERPL-MCNC: 0.5 MG/DL (ref 0–1.2)
BUN SERPL-MCNC: 31 MG/DL (ref 8–23)
BUN/CREAT SERPL: 18.6 (ref 7–25)
CALCIUM SERPL-MCNC: 9.8 MG/DL (ref 8.6–10.5)
CHLORIDE SERPL-SCNC: 100 MMOL/L (ref 98–107)
CHOLEST SERPL-MCNC: 127 MG/DL (ref 0–200)
CO2 SERPL-SCNC: 20.9 MMOL/L (ref 22–29)
CREAT SERPL-MCNC: 1.67 MG/DL (ref 0.57–1)
EGFRCR SERPLBLD CKD-EPI 2021: 30.1 ML/MIN/1.73
ERYTHROCYTE [DISTWIDTH] IN BLOOD BY AUTOMATED COUNT: 13 % (ref 12.3–15.4)
GLOBULIN SER CALC-MCNC: 2.9 GM/DL
GLUCOSE SERPL-MCNC: 102 MG/DL (ref 65–99)
HCT VFR BLD AUTO: 40.8 % (ref 34–46.6)
HDLC SERPL-MCNC: 50 MG/DL (ref 40–60)
HGB BLD-MCNC: 12.8 G/DL (ref 12–15.9)
LDLC SERPL CALC-MCNC: 59 MG/DL (ref 0–100)
MCH RBC QN AUTO: 30 PG (ref 26.6–33)
MCHC RBC AUTO-ENTMCNC: 31.4 G/DL (ref 31.5–35.7)
MCV RBC AUTO: 95.8 FL (ref 79–97)
PLATELET # BLD AUTO: 253 10*3/MM3 (ref 140–450)
POTASSIUM SERPL-SCNC: 4.8 MMOL/L (ref 3.5–5.2)
PROT SERPL-MCNC: 7.7 G/DL (ref 6–8.5)
RBC # BLD AUTO: 4.26 10*6/MM3 (ref 3.77–5.28)
SODIUM SERPL-SCNC: 136 MMOL/L (ref 136–145)
TRIGL SERPL-MCNC: 93 MG/DL (ref 0–150)
TSH SERPL DL<=0.005 MIU/L-ACNC: 0.13 UIU/ML (ref 0.27–4.2)
VLDLC SERPL CALC-MCNC: 18 MG/DL (ref 5–40)
WBC # BLD AUTO: 6.61 10*3/MM3 (ref 3.4–10.8)

## 2025-07-03 RX ORDER — LEVOTHYROXINE SODIUM 75 UG/1
75 TABLET ORAL DAILY
Qty: 90 TABLET | Refills: 1 | Status: SHIPPED | OUTPATIENT
Start: 2025-07-03

## 2025-07-03 NOTE — TELEPHONE ENCOUNTER
Name: Sylvia Stover      Relationship: Self      Best Callback Number: [phone]       HUB PROVIDED THE RELAY MESSAGE FROM THE OFFICE      PATIENT: VOICED UNDERSTANDING AND HAS NO FURTHER QUESTIONS AT THIS TIME    ADDITIONAL INFORMATION: PATIENT STATES SHE WOULD LIKE LABS COMPLETED IN OFFICE.

## 2025-07-07 ENCOUNTER — HOSPITAL ENCOUNTER (OUTPATIENT)
Dept: CARDIOLOGY | Facility: HOSPITAL | Age: 85
Discharge: HOME | End: 2025-07-07
Admitting: PODIATRIST
Payer: MEDICARE

## 2025-07-07 DIAGNOSIS — I73.9 PERIPHERAL VASCULAR DISEASE, UNSPECIFIED: ICD-10-CM

## 2025-07-07 LAB
BH CV LOWER ARTERIAL LEFT ABI RATIO: 1.22
BH CV LOWER ARTERIAL LEFT DORSALIS PEDIS SYS MAX: 160
BH CV LOWER ARTERIAL LEFT GREAT TOE SYS MAX: 104
BH CV LOWER ARTERIAL LEFT POST TIBIAL SYS MAX: 161
BH CV LOWER ARTERIAL LEFT TBI RATIO: 0.79
BH CV LOWER ARTERIAL RIGHT ABI RATIO: 1.17
BH CV LOWER ARTERIAL RIGHT DORSALIS PEDIS SYS MAX: 145
BH CV LOWER ARTERIAL RIGHT GREAT TOE SYS MAX: 110
BH CV LOWER ARTERIAL RIGHT POST TIBIAL SYS MAX: 155
BH CV LOWER ARTERIAL RIGHT TBI RATIO: 0.83
UPPER ARTERIAL LEFT ARM BRACHIAL SYS MAX: 132
UPPER ARTERIAL RIGHT ARM BRACHIAL SYS MAX: 130

## 2025-07-07 PROCEDURE — 93922 UPR/L XTREMITY ART 2 LEVELS: CPT

## 2025-07-07 PROCEDURE — 93922 UPR/L XTREMITY ART 2 LEVELS: CPT | Performed by: SURGERY

## 2025-07-13 ENCOUNTER — PATIENT MESSAGE (OUTPATIENT)
Dept: INTERNAL MEDICINE | Facility: CLINIC | Age: 85
End: 2025-07-13
Payer: MEDICARE

## 2025-07-13 PROBLEM — H81.10 BENIGN PAROXYSMAL POSITIONAL VERTIGO: Status: ACTIVE | Noted: 2025-07-13

## 2025-07-13 PROBLEM — H81.10 BENIGN PAROXYSMAL POSITIONAL VERTIGO: Chronic | Status: ACTIVE | Noted: 2025-07-13

## 2025-07-13 NOTE — ASSESSMENT & PLAN NOTE
Intermittent episodes of lightheadedness, takes Meclizine only as needed-refilled today. Denies chest pain and/or dyspnea.

## 2025-07-13 NOTE — PROGRESS NOTES
"Chief Complaint  Hypertension (6 month follow up)  Subjective        Sylvia Stover presents to [facility]  Hypertension  Chronicity:  Chronic  Onset:  More than 1 year ago  Progression since onset:  Stable  Condition status:  Controlled  Associated symptoms: no anxiety, no blurred vision, no chest pain, no headaches, no malaise/fatigue, no orthopnea, no palpitations, no peripheral edema and no shortness of breath    Agents associated with hypertension:  Thyroid hormones  Compliance problems:  No compliance problems    History of Present Illness  The patient presents for evaluation of dizziness, itchy eyes, atrial fibrillation, and health maintenance.    She experiences occasional dizziness, approximately once a year or less. The most recent episode occurred a month ago, managed effectively with meclizine. She seeks a new prescription for meclizine.    She reports itchy eyes on some days and is interested in over-the-counter eye drops. She does not use allergy medications and reports no nasal congestion or drainage.    She consulted cardiology a few months ago, and her loop recorder was examined which did not show any acute abnl. She has difficulty finding words since her stroke but notes this is improved.     She reports no shortness of breath and generally sleeps well, occasionally waking up at night, which is alleviated by eating without stomach upset. She sees a nephrologist every 6 months for blood work and is scheduled to see Dr. Franki Lauren on 11/20/2025.    Objective   Vital Signs:  /84 (BP Location: Left arm, Patient Position: Sitting, Cuff Size: Adult)   Pulse 67   Temp 97.9 °F (36.6 °C)   Ht 154.9 cm (61\")   Wt 58.9 kg (129 lb 12.8 oz)   SpO2 99%   BMI 24.53 kg/m²   Estimated body mass index is 24.53 kg/m² as calculated from the following:    Height as of this encounter: 154.9 cm (61\").    Weight as of this encounter: 58.9 kg (129 lb 12.8 oz).    BMI is within normal " parameters. No other follow-up for BMI required.      Physical Exam  Constitutional:       Appearance: She is well-developed. She is not ill-appearing.   HENT:      Head: Normocephalic.      Right Ear: Hearing, tympanic membrane and external ear normal.      Left Ear: Hearing, tympanic membrane and external ear normal.      Nose: Nose normal. No nasal deformity, mucosal edema or rhinorrhea.      Right Sinus: No maxillary sinus tenderness or frontal sinus tenderness.      Left Sinus: No maxillary sinus tenderness or frontal sinus tenderness.      Mouth/Throat:      Dentition: Normal dentition.   Eyes:      General: Lids are normal.         Right eye: No discharge.         Left eye: No discharge.      Conjunctiva/sclera: Conjunctivae normal.      Right eye: No exudate.     Left eye: No exudate.  Neck:      Thyroid: No thyroid mass or thyromegaly.      Vascular: No carotid bruit.      Trachea: Trachea normal.   Cardiovascular:      Rate and Rhythm: Regular rhythm.      Pulses: Normal pulses.      Heart sounds: Normal heart sounds. No murmur heard.  Pulmonary:      Effort: No respiratory distress.      Breath sounds: Normal breath sounds. No decreased breath sounds, wheezing, rhonchi or rales.   Abdominal:      General: Bowel sounds are normal.      Palpations: Abdomen is soft.      Tenderness: There is no abdominal tenderness.   Musculoskeletal:      Cervical back: Normal range of motion. No edema.   Lymphadenopathy:      Head:      Right side of head: No submental, submandibular, tonsillar, preauricular, posterior auricular or occipital adenopathy.      Left side of head: No submental, submandibular, tonsillar, preauricular, posterior auricular or occipital adenopathy.   Skin:     General: Skin is warm and dry.      Nails: There is no clubbing.   Neurological:      Mental Status: She is alert.   Psychiatric:         Behavior: Behavior is cooperative.            Result Review :  The following data was reviewed by:  Rhoda Luna, APRN on 07/02/2025:  Common labs          11/19/2024    12:19 7/2/2025    14:39   Common Labs   Glucose 102  102    BUN 32  31.0    Creatinine 1.54  1.67    Sodium 132  136    Potassium 4.6  4.8    Chloride 98  100    Calcium 9.2  9.8    Albumin 4.5  4.8    Total Bilirubin 0.5  0.5    Alkaline Phosphatase 48  50    AST (SGOT) 14  16    ALT (SGPT) 14  15    WBC  6.61    Hemoglobin  12.8    Hematocrit  40.8    Platelets  253    Total Cholesterol  127    Triglycerides  93    HDL Cholesterol  50    LDL Cholesterol   59      Data reviewed : Consultant notes cardiology 4/18/25     Results             Assessment and Plan   Diagnoses and all orders for this visit:    1. Essential hypertension (Primary)  Assessment & Plan:  BP is well-controlled on amlodipine and lisinopril which she will continue along with a low sodium diet.    Orders:  -     CBC (No Diff)  -     Comprehensive Metabolic Panel    2. Benign paroxysmal positional vertigo, unspecified laterality  Assessment & Plan:  Intermittent episodes of lightheadedness, takes Meclizine only as needed-refilled today. Denies chest pain and/or dyspnea.    Orders:  -     meclizine (ANTIVERT) 12.5 MG tablet; 1-2 tablets every 8 hours as needed  Dispense: 30 tablet; Refill: 0    3. Pure hypercholesterolemia  Assessment & Plan:  She is managed on atorvastatin without myalgias, recheck lipid panel.    Orders:  -     Lipid Panel    4. Acquired hypothyroidism  Assessment & Plan:  She is currently managed on Synthroid 75 mcg daily, recheck TSH.    Orders:  -     TSH  -     levothyroxine (SYNTHROID, LEVOTHROID) 75 MCG tablet; Take 1 tablet by mouth Daily.  Dispense: 90 tablet; Refill: 1  -     TSH Rfx On Abnormal To Free T4      Assessment & Plan    Itchy eyes.  Occasional itchy eyes. Recommended over-the-counter antihistamine eye drops as needed-may use Zaditor or Alaway.    Health maintenance.  Weight stable, slight decrease possibly due to different times of day.  Kidney function stable. Blood work today to update health parameters. Planning for influenza vaccine in the fall.         Follow Up   Return in about 6 months (around 1/2/2026) for wellness.  Patient was given instructions and counseling regarding her condition or for health maintenance advice. Please see specific information pulled into the AVS if appropriate.     Patient or patient representative verbalized consent for the use of Ambient Listening during the visit with  JUNE Mohr for chart documentation. 7/13/2025  07:52 EDT

## 2025-07-17 ENCOUNTER — TELEMEDICINE (OUTPATIENT)
Dept: INTERNAL MEDICINE | Facility: CLINIC | Age: 85
End: 2025-07-17
Payer: MEDICARE

## 2025-07-17 ENCOUNTER — PATIENT MESSAGE (OUTPATIENT)
Dept: INTERNAL MEDICINE | Facility: CLINIC | Age: 85
End: 2025-07-17

## 2025-07-17 VITALS — HEIGHT: 61 IN | BODY MASS INDEX: 24.53 KG/M2
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
## 2025-07-17 DIAGNOSIS — S00.83XA FACIAL HEMATOMA, INITIAL ENCOUNTER: Primary | ICD-10-CM

## 2025-07-17 RX ORDER — AMLODIPINE BESYLATE 10 MG/1
10 TABLET ORAL DAILY
Qty: 90 TABLET | Refills: 1 | Status: SHIPPED | OUTPATIENT
Start: 2025-07-17

## 2025-07-20 PROBLEM — S00.83XA FACIAL HEMATOMA: Status: ACTIVE | Noted: 2025-07-20

## 2025-07-20 NOTE — PROGRESS NOTES
"Chief Complaint  Hospital Follow Up Visit (DC 7/10//[facility])    Subjective        Sylvia Stover presents to [facility]     You have chosen to receive care through a telehealth visit.  Do you consent to use a video/audio connection for your medical care today? Yes  Mode of Visit: Video  Location of patient: home  Location of provider: [facility]  You have chosen to receive care through a telehealth visit.  The patient has signed the video visit consent form.  The visit included audio and video interaction. No technical issues occurred during this visit.     History of Present Illness  History of Present Illness  The patient presents for f/u regarding recent ER visit for a fall.    She reports significant facial swelling, likely a hematoma, with several abrasions but no cuts. She has been applying ice but is unsure of its benefit.    She experienced a fall on 07/08/2025, details of which she cannot recall. She was found unconscious and transported to the hospital by ambulance. She felt upset prior to the fall due to a conversation at a meeting. She was at the library when the fall occurred. She felt well that day apart from the discomfort from the conversation. She is unsure if she was walking or sitting when the fall happened. Since the fall, she has not experienced headaches, blurred vision, dizziness an/or nausea and vomiting. She does not have a history of frequent falls and does not currently engage in strength or balance exercises.     According to ER notes there was a witness who saw her trip on her feet prior to fall.    Workup in ER including CT head, CT cervical spine and CT maxillofacial bones.       Objective   Vital Signs:  Ht 154.9 cm (61\")   BMI 24.53 kg/m²   Estimated body mass index is 24.53 kg/m² as calculated from the following:    Height as of this encounter: 154.9 cm (61\").    Weight as of 7/2/25: 58.9 kg (129 lb 12.8 oz).       BMI is within normal parameters. No " other follow-up for BMI required.      Physical Exam  Constitutional:       Appearance: She is well-developed.   HENT:      Head: Normocephalic and atraumatic.      Comments: Large hematoma on left axillary area without active bleeding  Eyes:      General:         Right eye: No discharge.         Left eye: No discharge.      Conjunctiva/sclera: Conjunctivae normal.   Pulmonary:      Effort: Pulmonary effort is normal.   Neurological:      Mental Status: She is alert and oriented to person, place, and time.   Psychiatric:         Behavior: Behavior normal.         Thought Content: Thought content normal.         Judgment: Judgment normal.        Physical Exam      Result Review :  The following data was reviewed by: JUNE Mohr on 07/17/2025:  Common labs          11/19/2024    12:19 7/2/2025    14:39   Common Labs   Glucose 102  102    BUN 32  31.0    Creatinine 1.54  1.67    Sodium 132  136    Potassium 4.6  4.8    Chloride 98  100    Calcium 9.2  9.8    Albumin 4.5  4.8    Total Bilirubin 0.5  0.5    Alkaline Phosphatase 48  50    AST (SGOT) 14  16    ALT (SGPT) 14  15    WBC  6.61    Hemoglobin  12.8    Hematocrit  40.8    Platelets  253    Total Cholesterol  127    Triglycerides  93    HDL Cholesterol  50    LDL Cholesterol   59      Data reviewed : Radiologic studies CXR, XR pelvis, CT head, cervical spine and facial bones 7/8/25 and Recent hospitalization notes 7/8/25  Results  Imaging  Chest x-ray showed no infection. Pelvic x-ray showed no abnormalities. CT of head showed periorbital swelling and a small hematoma of the left maxilla, but no internal hemorrhage or stroke. CT of neck showed no fractures and some arthritis. Carotid duplex bilateral showed no significant narrowing in carotid arteries.              Assessment and Plan   Diagnoses and all orders for this visit:    1. Facial hematoma, initial encounter (Primary)  Assessment & Plan:  She notes tenderness in area without open wounds or  lacerations, continue to apply ice to area and take Tylenol if needed for pain control.      2. Fall, initial encounter  Assessment & Plan:  Questionable syncopal episode-witness saw her trip on her feet according to ER notes.  Fall on 07/08/2025 resulted in head injury and loss of consciousness. Imaging and echocardiogram showed no acute abnl. Carotid duplex ultrasound revealed no significant narrowing in carotid arteries. No evidence of stroke or internal hemorrhage. Strength and balance exercises will be provided via "[employer] to prevent future falls (has declined PT).        Assessment & Plan      History and medical judgement obtained from video conversation with patient. No hands-on physical examination was performed. Approximately  minutes 20 spent in video conversation with patient and in chart review/reviewing ER notes.           Follow Up   No follow-ups on file.  Patient was given instructions and counseling regarding her condition or for health maintenance advice. Please see specific information pulled into the AVS if appropriate.   Patient or patient representative verbalized consent for the use of Ambient Listening during the visit with  JUNE Mohr for chart documentation. 7/20/2025  12:16 EDT

## 2025-07-20 NOTE — ASSESSMENT & PLAN NOTE
Questionable syncopal episode-witness saw her trip on her feet according to ER notes.  Fall on 07/08/2025 resulted in head injury and loss of consciousness. Imaging and echocardiogram showed no acute abnl. Carotid duplex ultrasound revealed no significant narrowing in carotid arteries. No evidence of stroke or internal hemorrhage. Strength and balance exercises will be provided via [employer] to prevent future falls (has declined PT).

## 2025-07-20 NOTE — ASSESSMENT & PLAN NOTE
She notes tenderness in area without open wounds or lacerations, continue to apply ice to area and take Tylenol if needed for pain control.

## 2025-07-21 NOTE — TELEPHONE ENCOUNTER
Please send exercises for balance through [employer] or mail to patient. Please notify pt, thanks.

## 2025-08-01 ENCOUNTER — TELEPHONE (OUTPATIENT)
Dept: INTERNAL MEDICINE | Facility: CLINIC | Age: 85
End: 2025-08-01
Payer: MEDICARE

## 2025-08-01 DIAGNOSIS — H91.93 BILATERAL HEARING LOSS, UNSPECIFIED HEARING LOSS TYPE: Primary | ICD-10-CM

## 2025-08-01 NOTE — TELEPHONE ENCOUNTER
[facility] called office today regarding Sylvia Stover. Patient is at [facility] for hearing test. They are needing a referral order for hearing test faxed to them [phone]

## 2025-08-21 ENCOUNTER — DOCUMENTATION (OUTPATIENT)
Dept: INTERNAL MEDICINE | Facility: CLINIC | Age: 85
End: 2025-08-21
Payer: MEDICARE

## 2025-08-26 DIAGNOSIS — E03.9 ACQUIRED HYPOTHYROIDISM: Primary | ICD-10-CM
